# Patient Record
Sex: FEMALE | Race: WHITE | NOT HISPANIC OR LATINO | Employment: OTHER | URBAN - NONMETROPOLITAN AREA
[De-identification: names, ages, dates, MRNs, and addresses within clinical notes are randomized per-mention and may not be internally consistent; named-entity substitution may affect disease eponyms.]

---

## 2017-08-02 ENCOUNTER — TRANSFERRED RECORDS (OUTPATIENT)
Dept: HEALTH INFORMATION MANAGEMENT | Facility: OTHER | Age: 62
End: 2017-08-02

## 2017-11-17 ENCOUNTER — TRANSFERRED RECORDS (OUTPATIENT)
Dept: HEALTH INFORMATION MANAGEMENT | Facility: OTHER | Age: 62
End: 2017-11-17

## 2018-08-08 ENCOUNTER — OFFICE VISIT (OUTPATIENT)
Dept: FAMILY MEDICINE | Facility: OTHER | Age: 63
End: 2018-08-08
Attending: NURSE PRACTITIONER
Payer: COMMERCIAL

## 2018-08-08 VITALS
DIASTOLIC BLOOD PRESSURE: 88 MMHG | BODY MASS INDEX: 29.91 KG/M2 | HEIGHT: 64 IN | SYSTOLIC BLOOD PRESSURE: 150 MMHG | WEIGHT: 175.2 LBS | HEART RATE: 64 BPM | TEMPERATURE: 96.8 F

## 2018-08-08 DIAGNOSIS — M65.311 TRIGGER THUMB OF RIGHT HAND: ICD-10-CM

## 2018-08-08 DIAGNOSIS — E11.9 TYPE 2 DIABETES MELLITUS NOT AT GOAL (H): ICD-10-CM

## 2018-08-08 DIAGNOSIS — Z76.0 ENCOUNTER FOR MEDICATION REFILL: ICD-10-CM

## 2018-08-08 DIAGNOSIS — I10 HYPERTENSION, UNSPECIFIED TYPE: ICD-10-CM

## 2018-08-08 DIAGNOSIS — L71.9 ROSACEA: Primary | ICD-10-CM

## 2018-08-08 DIAGNOSIS — E11.9 DIABETES MELLITUS TYPE 2, DIET-CONTROLLED (H): ICD-10-CM

## 2018-08-08 LAB
ALBUMIN SERPL-MCNC: 4.3 G/DL (ref 3.5–5.7)
ALP SERPL-CCNC: 79 U/L (ref 34–104)
ALT SERPL W P-5'-P-CCNC: 17 U/L (ref 7–52)
ANION GAP SERPL CALCULATED.3IONS-SCNC: 7 MMOL/L (ref 3–14)
AST SERPL W P-5'-P-CCNC: 21 U/L (ref 13–39)
BILIRUB SERPL-MCNC: 1.2 MG/DL (ref 0.3–1)
BUN SERPL-MCNC: 14 MG/DL (ref 7–25)
CALCIUM SERPL-MCNC: 9.8 MG/DL (ref 8.6–10.3)
CHLORIDE SERPL-SCNC: 104 MMOL/L (ref 98–107)
CHOLEST SERPL-MCNC: 185 MG/DL
CO2 SERPL-SCNC: 28 MMOL/L (ref 21–31)
CREAT SERPL-MCNC: 0.85 MG/DL (ref 0.6–1.2)
GFR SERPL CREATININE-BSD FRML MDRD: 67 ML/MIN/1.7M2
GLUCOSE SERPL-MCNC: 167 MG/DL (ref 70–105)
HBA1C MFR BLD: 7.7 % (ref 4–6)
HDLC SERPL-MCNC: 47 MG/DL (ref 23–92)
LDLC SERPL CALC-MCNC: 116 MG/DL
NONHDLC SERPL-MCNC: 138 MG/DL
POTASSIUM SERPL-SCNC: 4.1 MMOL/L (ref 3.5–5.1)
PROT SERPL-MCNC: 8 G/DL (ref 6.4–8.9)
SODIUM SERPL-SCNC: 139 MMOL/L (ref 134–144)
TRIGL SERPL-MCNC: 109 MG/DL

## 2018-08-08 PROCEDURE — 80061 LIPID PANEL: CPT | Performed by: NURSE PRACTITIONER

## 2018-08-08 PROCEDURE — 36415 COLL VENOUS BLD VENIPUNCTURE: CPT | Performed by: NURSE PRACTITIONER

## 2018-08-08 PROCEDURE — G0463 HOSPITAL OUTPT CLINIC VISIT: HCPCS

## 2018-08-08 PROCEDURE — 83036 HEMOGLOBIN GLYCOSYLATED A1C: CPT | Performed by: NURSE PRACTITIONER

## 2018-08-08 PROCEDURE — 99203 OFFICE O/P NEW LOW 30 MIN: CPT | Performed by: NURSE PRACTITIONER

## 2018-08-08 PROCEDURE — 80053 COMPREHEN METABOLIC PANEL: CPT | Performed by: NURSE PRACTITIONER

## 2018-08-08 RX ORDER — DOXYCYCLINE 100 MG/1
100 CAPSULE ORAL
COMMUNITY
End: 2018-08-08

## 2018-08-08 RX ORDER — METOPROLOL SUCCINATE 50 MG/1
50 TABLET, EXTENDED RELEASE ORAL DAILY
Qty: 90 TABLET | Refills: 3 | Status: SHIPPED | OUTPATIENT
Start: 2018-08-08 | End: 2019-10-10

## 2018-08-08 RX ORDER — TRIAMTERENE AND HYDROCHLOROTHIAZIDE 37.5; 25 MG/1; MG/1
CAPSULE ORAL
COMMUNITY
Start: 2017-01-06 | End: 2018-08-08

## 2018-08-08 RX ORDER — METOPROLOL SUCCINATE 50 MG/1
50 TABLET, EXTENDED RELEASE ORAL
COMMUNITY
Start: 2017-02-07 | End: 2018-08-08

## 2018-08-08 RX ORDER — INSULIN PUMP SYRINGE, 3 ML
EACH MISCELLANEOUS
COMMUNITY
Start: 2017-08-04

## 2018-08-08 RX ORDER — TRIAMTERENE AND HYDROCHLOROTHIAZIDE 37.5; 25 MG/1; MG/1
1 CAPSULE ORAL DAILY
Qty: 90 CAPSULE | Refills: 3 | Status: SHIPPED | OUTPATIENT
Start: 2018-08-08 | End: 2019-08-27

## 2018-08-08 RX ORDER — BLOOD-GLUCOSE METER
KIT MISCELLANEOUS
COMMUNITY
Start: 2016-08-11 | End: 2019-10-10

## 2018-08-08 RX ORDER — DOXYCYCLINE 100 MG/1
100 CAPSULE ORAL DAILY
Qty: 90 CAPSULE | Refills: 3 | Status: SHIPPED | OUTPATIENT
Start: 2018-08-08 | End: 2020-04-16

## 2018-08-08 ASSESSMENT — ANXIETY QUESTIONNAIRES
2. NOT BEING ABLE TO STOP OR CONTROL WORRYING: NOT AT ALL
GAD7 TOTAL SCORE: 0
5. BEING SO RESTLESS THAT IT IS HARD TO SIT STILL: NOT AT ALL
6. BECOMING EASILY ANNOYED OR IRRITABLE: NOT AT ALL
7. FEELING AFRAID AS IF SOMETHING AWFUL MIGHT HAPPEN: NOT AT ALL
3. WORRYING TOO MUCH ABOUT DIFFERENT THINGS: NOT AT ALL
1. FEELING NERVOUS, ANXIOUS, OR ON EDGE: NOT AT ALL

## 2018-08-08 ASSESSMENT — PAIN SCALES - GENERAL: PAINLEVEL: MODERATE PAIN (5)

## 2018-08-08 ASSESSMENT — PATIENT HEALTH QUESTIONNAIRE - PHQ9: 5. POOR APPETITE OR OVEREATING: NOT AT ALL

## 2018-08-08 NOTE — LETTER
August 13, 2018      Margie Mccoy  [id number]  [place] [address]        Dear ,    Your lab results show normal indicators of kidney and liver function and your cholesterol and lipids are under reasonable control without medication however your A1c shows diabetes level baseline glucose.  Would I would recommend that you start Metformin extended release 500 mg tab once a day and recheck your A1c and fasting cholesterol and lipids in 3 months  Preferably in clinic so we can review labs and discuss a plan.  We will send the metformin to your pharmacy to get started please let me know--if you have any questions  I will see you in 3 months for fasting lipids, A1c follow-up labs and medication review.        Results for orders placed or performed in visit on 08/08/18   Hemoglobin A1c   Result Value Ref Range    Hemoglobin A1C 7.7 (H) 4.0 - 6.0 %   Lipid Panel   Result Value Ref Range    Cholesterol 185 <200 mg/dL    Triglycerides 109 <150 mg/dL    HDL Cholesterol 47 23 - 92 mg/dL    LDL Cholesterol Calculated 116 (H) <100 mg/dL    Non HDL Cholesterol 138 (H) <130 mg/dL   Comprehensive Metabolic Panel   Result Value Ref Range    Sodium 139 134 - 144 mmol/L    Potassium 4.1 3.5 - 5.1 mmol/L    Chloride 104 98 - 107 mmol/L    Carbon Dioxide 28 21 - 31 mmol/L    Anion Gap 7 3 - 14 mmol/L    Glucose 167 (H) 70 - 105 mg/dL    Urea Nitrogen 14 7 - 25 mg/dL    Creatinine 0.85 0.60 - 1.20 mg/dL    GFR Estimate 67 >60 mL/min/1.7m2    GFR Estimate If Black 81 >60 mL/min/1.7m2    Calcium 9.8 8.6 - 10.3 mg/dL    Bilirubin Total 1.2 (H) 0.3 - 1.0 mg/dL    Albumin 4.3 3.5 - 5.7 g/dL    Protein Total 8.0 6.4 - 8.9 g/dL    Alkaline Phosphatase 79 34 - 104 U/L    ALT 17 7 - 52 U/L    AST 21 13 - 39 U/L             If you have any questions or concerns, please call the clinic at the number listed above.       Sincerely,        PILAR Davis CNP

## 2018-08-08 NOTE — MR AVS SNAPSHOT
After Visit Summary   8/8/2018    Margie Mccoy    MRN: [id number]           Patient Information     Date Of Birth          1955        Visit Information        Provider Department      8/8/2018 8:30 AM Sravani Carrillo APRN [facility]        Today's Diagnoses     Rosacea    -  1    Hypertension, unspecified type        Diabetes mellitus type 2, diet-controlled (H)          Care Instructions      Managing Type 2 Diabetes    Type 2 diabetes is a long-term (chronic) condition. Managing your diabetes means making some changes that may be hard. Your healthcare provider, nurse, diabetes educator, and others can help you.  Managing type 2 diabetes means balancing your medicine with diet and activity. Managing your diabetes may also include checking your blood sugar. And, working with your healthcare provider to prevent complications.  Take your medicine as prescribed  You may take pills (oral medicine) or give yourself shots (usually insulin injections) for diabetes. Or you may use both. Taking your medicines or giving yourself insulin at the right times will help you control your blood sugar. Think about ways that will help you remember to take your medicines the right way every day. Ask your healthcare provider, nurse, or diabetes educator for ideas.  Although you may only take pills for your diabetes, this may change. Over time, most people with type 2 diabetes also use insulin.  Eat healthy  A healthy, well-planned diet helps control the amount of sugar in your blood. It also helps you stay at a healthy weight or lose weight, if you are overweight. Extra weight makes it harder to control diabetes.  Your healthcare provider, nurse, a dietitian, or diabetes educator will help you create a plan that works for you. You don't have to give up all the foods you like. Having meals and snacks with vegetables, fruits, lean meats, or other healthy proteins, whole grains, and low-fat or  nonfat dairy products will help control your blood sugar.  Be physically active  Being active helps lower your blood sugar. It does this by helping your body use insulin to turn food into energy. Activity also helps you manage your weight:  Ask your health care provider to work with you to create an activity program that's right for you. Your activity program is based on your age, general health, and types of activity you enjoy. You should start slowly, but aim for at least 150 minutes of exercise or activity. Don t let more than 2 days go by without being active.  Check your blood sugar  Checking your own blood sugar may be a regular part of your care. Or you may only check your blood sugar from time to time. Your healthcare provider will give you instructions about checking your blood sugar at home. Checking it tells you if your blood sugar is in your target range. Having your blood sugars within the target range means that you are managing your diabetes well.  If your blood sugar levels are too high or too low, your healthcare provider may suggest changes to your diet or activity level. He or she may also adjust your medicine.  Your healthcare provider may also tell you to check your blood sugar more often when you are sick. At certain times, for example, when you have a cold or the flu, you may need to check it more often.  Take care of yourself  When you have diabetes, you may be more likely to develop other health problems. They include foot, eye, heart, and kidney problems. By controlling your blood sugar, and taking good care of yourself, you can help prevent these problems. Your health care provider, nurse, diabetes educator, and others can assist you:    Checkups. You should have regular checkups with your healthcare provider. At those visits, you will have a physical exam that includes checking your feet. Your healthcare provider will also check your blood pressure and weight.    Other exams. You should  also have complete eye, foot, and dental exams at least once every year.    Lab tests. You will have blood and urine tests:  ? At least two times a year, your healthcare provider will check your hemoglobin A1C. This blood test shows how well you have been controlling your blood sugar over 2 to 3 months. The results help your healthcare provider manage your diabetes.    ? You will also have other lab tests. For example, to check for kidney problems and abnormal cholesterol levels.    Smoking. If you smoke, you will need to quit. Smoking increases the chance that you will develop complications from diabetes. Ask your healthcare provider about ways to quit.    Vaccines. Get a yearly flu shot. And, ask your healthcare provider about vaccines to prevent pneumonia, shingles, and hepatitis B.  Stress and depression  Most people have challenges throughout their lives. Living with diabetes, or any serious condition, can increase your stress and make you feel a lot of different emotions. In diabetes, feeling stressed or depressed can actually affect your blood sugar levels.  If you are having trouble dealing with diabetes, tell your healthcare provider. He or she can help or refer you to other healthcare providers or programs.  Support and resources  Know where you can get help. You can try the following:    Support. Ask family and friends to support your effects to take care of yourself. Or, look for a diabetes support group locally or on the internet. (Check the Connect with Others link on www.diabetes.org)    Counseling. Talk with a , psychologist, psychiatrist, or other counselor.    Information. Contact the American Diabetes Association at 740-333-5544 or www.diabetes.org  Date Last Reviewed: 6/1/2016 2000-2017 [employer]. [address] [place] [address], [place] [address]. All rights reserved. This information is not intended as a substitute for professional medical care. Always follow your  "healthcare professional's instructions.                Follow-ups after your visit        Future tests that were ordered for you today     Open Future Orders        Priority Expected Expires Ordered    Lipid Panel Routine  2019    Comprehensive Metabolic Panel Routine  2019    Hemoglobin A1c Routine  2019            Who to contact     If you have questions or need follow up information about today's clinic visit or your schedule please contact [facility] directly at [phone].  Normal or non-critical lab and imaging results will be communicated to you by [employer], letter or phone within 4 business days after the clinic has received the results. If you do not hear from us within 7 days, please contact the clinic through [employer] or phone. If you have a critical or abnormal lab result, we will notify you by phone as soon as possible.  Submit refill requests through "[employer]" or call your pharmacy and they will forward the refill request to us. Please allow 3 business days for your refill to be completed.          Additional Information About Your Visit        [employer] Information     "[employer]" lets you send messages to your doctor, view your test results, renew your prescriptions, schedule appointments and more. To sign up, go to [email or web address]" . Click on \"Log in\" on the left side of the screen, which will take you to the Welcome page. Then click on \"Sign up Now\" on the right side of the page.     You will be asked to enter the access code listed below, as well as some personal information. Please follow the directions to create your username and password.     Your access code is: [email or web address]  Expires: 2018  8:19 AM     Your access code will  in 90 days. If you need help or a new code, please call your [place] clinic or [phone].        Care EveryWhere ID     This is your Care EveryWhere ID. This could be used by other organizations to " "access your [facility] medical records  [id number]        Your Vitals Were     Pulse Temperature Height Breastfeeding? BMI (Body Mass Index)       64 96.8  F (36  C) (Tympanic) 5' 3.75\" (1.619 m) No 30.31 kg/m2        Blood Pressure from Last 3 Encounters:   08/08/18 150/88    Weight from Last 3 Encounters:   08/08/18 175 lb 3.2 oz (79.5 kg)                 Today's Medication Changes          These changes are accurate as of 8/8/18  9:13 AM.  If you have any questions, ask your nurse or doctor.               These medicines have changed or have updated prescriptions.        Dose/Directions    doxycycline 100 MG capsule   Commonly known as:  VIBRAMYCIN   This may have changed:    - when to take this  - additional instructions   Used for:  Rosacea   Changed by:  Sravani Carrillo APRN CNP        Dose:  100 mg   Take 1 capsule (100 mg) by mouth daily As needed   Quantity:  90 capsule   Refills:  3       metoprolol succinate 50 MG 24 hr tablet   Commonly known as:  TOPROL-XL   This may have changed:  when to take this   Used for:  Hypertension, unspecified type   Changed by:  Sravani Carrillo APRN CNP        Dose:  50 mg   Take 1 tablet (50 mg) by mouth daily   Quantity:  90 tablet   Refills:  3       triamterene-hydrochlorothiazide 37.5-25 MG per capsule   Commonly known as:  DYAZIDE   This may have changed:    - how much to take  - when to take this   Used for:  Hypertension, unspecified type   Changed by:  Sravani Carrillo APRN CNP        Dose:  1 capsule   Take 1 capsule by mouth daily   Quantity:  90 capsule   Refills:  3            Where to get your medicines      These medications were sent to [facility] - [place], [place] - [address] [place] [address] [address]  [address] [address], [facility] [address]     Phone:  [phone]     doxycycline 100 MG capsule    metoprolol succinate 50 MG 24 hr tablet    triamterene-hydrochlorothiazide 37.5-25 MG per capsule                Primary Care Provider Fax #    Physician No " Ref-Primary [phone]       No address on file        Equal Access to Services     PEYMAN GRECOTHIAGO : Hadii irene ku brodie Guillen, waneoda lusparkle, heidi garcia, kenya brookin hayaafior lindseyphylicia santiago laeloinafior velazco. So [facility] [phone].    ATENCIÓN: Si habla español, tiene a suarez disposición servicios gratuitos de asistencia lingüística. Llame al [phone].    We comply with applicable federal civil rights laws and [place] laws. We do not discriminate on the basis of race, color, national origin, age, disability, sex, sexual orientation, or gender identity.            Thank you!     Thank you for choosing [facility] AND [facility]  for your care. Our goal is always to provide you with excellent care. Hearing back from our patients is one way we can continue to improve our services. Please take a few minutes to complete the written survey that you may receive in the mail after your visit with us. Thank you!             Your Updated Medication List - Protect others around you: Learn how to safely use, store and throw away your medicines at www.disposemymeds.org.          This list is accurate as of 8/8/18  9:13 AM.  Always use your most recent med list.                   Brand Name Dispense Instructions for use Diagnosis    B-D ULTRA-FINE 33 LANCETS Misc      As directed once daily. DX: E11.9 Diabetes Mellitus Type 2 controlled.        doxycycline 100 MG capsule    VIBRAMYCIN    90 capsule    Take 1 capsule (100 mg) by mouth daily As needed    Rosacea       FIFTY50 GLUCOSE METER 2.0 w/Device Kit      Dispense meter, test strips, lancets covered by pt ins. E11.65 NIDDM type II, uncontrolled - Test 2 times/day. Reason: High A1C        [employer] TEST STRIPS test strip   Generic drug:  blood glucose monitoring      Dispense item covered by pt ins. E11.65 NIDDM type II, uncontrolled - Test 2 times/day. Reason: High A1C        metoprolol succinate 50 MG 24 hr tablet    TOPROL-XL    90 tablet    Take 1 tablet (50  mg) by mouth daily    Hypertension, unspecified type       triamterene-hydrochlorothiazide 37.5-25 MG per capsule    DYAZIDE    90 capsule    Take 1 capsule by mouth daily    Hypertension, unspecified type

## 2018-08-08 NOTE — NURSING NOTE
"Patient is here today to establish care and for a physical. She does not want a pap today. She also needs refills on her medications. Catherine Kathleen LPN......................8/8/2018 8:30 AM    Previous A1C unknown  at goal of <8  Urine microalbumin:creatine: unknown  Foot exam over a year  Eye exam Nov. 2017  Patient is not a current smoker  Patient is not on a daily aspirin  Blood pressure today of 150/88 is not  at the goal of <140/90 for diabetics.  Chief Complaint   Patient presents with     Establish Care     physical, doesn't want pap       Initial /88 (BP Location: Right arm, Patient Position: Sitting, Cuff Size: Adult Large)  Pulse 64  Temp 96.8  F (36  C) (Tympanic)  Ht 5' 3.75\" (1.619 m)  Wt 175 lb 3.2 oz (79.5 kg)  Breastfeeding? No  BMI 30.31 kg/m2 Estimated body mass index is 30.31 kg/(m^2) as calculated from the following:    Height as of this encounter: 5' 3.75\" (1.619 m).    Weight as of this encounter: 175 lb 3.2 oz (79.5 kg).  Medication Reconciliation: complete    Catherine Kathleen LPN      "

## 2018-08-08 NOTE — PATIENT INSTRUCTIONS
Managing Type 2 Diabetes    Type 2 diabetes is a long-term (chronic) condition. Managing your diabetes means making some changes that may be hard. Your healthcare provider, nurse, diabetes educator, and others can help you.  Managing type 2 diabetes means balancing your medicine with diet and activity. Managing your diabetes may also include checking your blood sugar. And, working with your healthcare provider to prevent complications.  Take your medicine as prescribed  You may take pills (oral medicine) or give yourself shots (usually insulin injections) for diabetes. Or you may use both. Taking your medicines or giving yourself insulin at the right times will help you control your blood sugar. Think about ways that will help you remember to take your medicines the right way every day. Ask your healthcare provider, nurse, or diabetes educator for ideas.  Although you may only take pills for your diabetes, this may change. Over time, most people with type 2 diabetes also use insulin.  Eat healthy  A healthy, well-planned diet helps control the amount of sugar in your blood. It also helps you stay at a healthy weight or lose weight, if you are overweight. Extra weight makes it harder to control diabetes.  Your healthcare provider, nurse, a dietitian, or diabetes educator will help you create a plan that works for you. You don't have to give up all the foods you like. Having meals and snacks with vegetables, fruits, lean meats, or other healthy proteins, whole grains, and low-fat or nonfat dairy products will help control your blood sugar.  Be physically active  Being active helps lower your blood sugar. It does this by helping your body use insulin to turn food into energy. Activity also helps you manage your weight:  Ask your health care provider to work with you to create an activity program that's right for you. Your activity program is based on your age, general health, and types of activity you enjoy. You  should start slowly, but aim for at least 150 minutes of exercise or activity. Don t let more than 2 days go by without being active.  Check your blood sugar  Checking your own blood sugar may be a regular part of your care. Or you may only check your blood sugar from time to time. Your healthcare provider will give you instructions about checking your blood sugar at home. Checking it tells you if your blood sugar is in your target range. Having your blood sugars within the target range means that you are managing your diabetes well.  If your blood sugar levels are too high or too low, your healthcare provider may suggest changes to your diet or activity level. He or she may also adjust your medicine.  Your healthcare provider may also tell you to check your blood sugar more often when you are sick. At certain times, for example, when you have a cold or the flu, you may need to check it more often.  Take care of yourself  When you have diabetes, you may be more likely to develop other health problems. They include foot, eye, heart, and kidney problems. By controlling your blood sugar, and taking good care of yourself, you can help prevent these problems. Your health care provider, nurse, diabetes educator, and others can assist you:    Checkups. You should have regular checkups with your healthcare provider. At those visits, you will have a physical exam that includes checking your feet. Your healthcare provider will also check your blood pressure and weight.    Other exams. You should also have complete eye, foot, and dental exams at least once every year.    Lab tests. You will have blood and urine tests:  ? At least two times a year, your healthcare provider will check your hemoglobin A1C. This blood test shows how well you have been controlling your blood sugar over 2 to 3 months. The results help your healthcare provider manage your diabetes.    ? You will also have other lab tests. For example, to check for  kidney problems and abnormal cholesterol levels.    Smoking. If you smoke, you will need to quit. Smoking increases the chance that you will develop complications from diabetes. Ask your healthcare provider about ways to quit.    Vaccines. Get a yearly flu shot. And, ask your healthcare provider about vaccines to prevent pneumonia, shingles, and hepatitis B.  Stress and depression  Most people have challenges throughout their lives. Living with diabetes, or any serious condition, can increase your stress and make you feel a lot of different emotions. In diabetes, feeling stressed or depressed can actually affect your blood sugar levels.  If you are having trouble dealing with diabetes, tell your healthcare provider. He or she can help or refer you to other healthcare providers or programs.  Support and resources  Know where you can get help. You can try the following:    Support. Ask family and friends to support your effects to take care of yourself. Or, look for a diabetes support group locally or on the internet. (Check the Connect with Others link on www.diabetes.org)    Counseling. Talk with a , psychologist, psychiatrist, or other counselor.    Information. Contact the American Diabetes Association at 140-153-5359 or www.diabetes.org  Date Last Reviewed: 6/1/2016 2000-2017 [employer]. [address] [place] [address], [place] [address]. All rights reserved. This information is not intended as a substitute for professional medical care. Always follow your healthcare professional's instructions.

## 2018-08-10 ASSESSMENT — ANXIETY QUESTIONNAIRES: GAD7 TOTAL SCORE: 0

## 2018-08-13 PROBLEM — E11.9 DIABETES MELLITUS TYPE 2, DIET-CONTROLLED (H): Status: RESOLVED | Noted: 2018-08-08 | Resolved: 2018-08-13

## 2018-08-13 PROBLEM — E11.9 TYPE 2 DIABETES MELLITUS NOT AT GOAL (H): Status: ACTIVE | Noted: 2018-08-13

## 2018-08-13 PROBLEM — E11.9 DIABETES TYPE 2, CONTROLLED (H): Status: RESOLVED | Noted: 2018-08-08 | Resolved: 2018-08-13

## 2018-08-13 PROBLEM — L71.9 ROSACEA: Status: ACTIVE | Noted: 2018-08-13

## 2018-08-13 RX ORDER — METFORMIN HCL 500 MG
500 TABLET, EXTENDED RELEASE 24 HR ORAL DAILY
Qty: 90 TABLET | Refills: 1 | Status: SHIPPED | OUTPATIENT
Start: 2018-08-13 | End: 2019-10-10 | Stop reason: DRUGHIGH

## 2018-08-13 NOTE — PROGRESS NOTES
SUBJECTIVE:   Margie Mccoy is a 63 year old female who presents to clinic today for the following health issues:    Presents for medication review, establish care, monitoring labs.  Used to receive her care per [facility]  Patient is an RN recently retired--used to work for [employer]  Transferring care to [place].  Reports has not taken medications for her type 2 diabetes, does not remember what previous A1c, does not check her glucose regularly.  Also reports chronic issues with rosacea, she has seen a dermatologist in the past and recommended doxycycline treatment--feels this has been effective and has tolerated well  She is not interested in topical treatment at this time  She reports a history of normal Paps and feels that she had one recently--she will sign release of information for previous records  Reports has been dealing with the trigger finger issue of her right thumb for months would like to see orthopedic regarding treatment--denies any injury associated        HPI      Patient Active Problem List   Diagnosis     Hypertension     [facility]     Type 2 diabetes mellitus not at goal (H)     Rosacea     History reviewed. No pertinent surgical history.    Social History   Substance Use Topics     Smoking status: Never Smoker     Smokeless tobacco: Never Used     Alcohol use Yes      Comment: 2 drinks per month      History reviewed. No pertinent family history.      Current Outpatient Prescriptions   Medication Sig Dispense Refill     B-D ULTRA-FINE 33 LANCETS MISC As directed once daily. DX: E11.9 Diabetes Mellitus Type 2 controlled.       blood glucose monitoring ([employer] TEST STRIPS) test strip Dispense item covered by pt ins. E11.65 NIDDM type II, uncontrolled - Test 2 times/day. Reason: High A1C       Blood Glucose Monitoring Suppl (FIFTY50 GLUCOSE METER 2.0) w/Device KIT Dispense meter, test strips, lancets covered by pt ins. E11.65 NIDDM type II, uncontrolled -  "Test 2 times/day. Reason: High A1C       doxycycline (VIBRAMYCIN) 100 MG capsule Take 1 capsule (100 mg) by mouth daily As needed 90 capsule 3     metFORMIN (GLUCOPHAGE-XR) 500 MG 24 hr tablet Take 1 tablet (500 mg) by mouth daily 90 tablet 1     metoprolol succinate (TOPROL-XL) 50 MG 24 hr tablet Take 1 tablet (50 mg) by mouth daily 90 tablet 3     triamterene-hydrochlorothiazide (DYAZIDE) 37.5-25 MG per capsule Take 1 capsule by mouth daily 90 capsule 3     Allergies   Allergen Reactions     Demeclocycline Nausea     Needs to take with food     Oxycodone Nausea     Tetracyclines GI Disturbance     Needs to take with food     Recent Labs   Lab Test  08/08/18   0925   A1C  7.7*   LDL  116*   HDL  47   TRIG  109   ALT  17   CR  0.85   GFRESTIMATED  67   GFRESTBLACK  81   POTASSIUM  4.1      BP Readings from Last 3 Encounters:   08/08/18 150/88    Wt Readings from Last 3 Encounters:   08/08/18 175 lb 3.2 oz (79.5 kg)                  Labs reviewed in EPIC    Review of Systems   Skin: Positive for rash.   All other systems reviewed and are negative.       OBJECTIVE:     /88 (BP Location: Right arm, Patient Position: Sitting, Cuff Size: Adult Large)  Pulse 64  Temp 96.8  F (36  C) (Tympanic)  Ht 5' 3.75\" (1.619 m)  Wt 175 lb 3.2 oz (79.5 kg)  Breastfeeding? No  BMI 30.31 kg/m2  Body mass index is 30.31 kg/(m^2).  Physical Exam   Constitutional: She is oriented to person, place, and time. She appears well-developed and well-nourished.   Cardiovascular: Normal rate.    Pulmonary/Chest: Effort normal.   Musculoskeletal: Normal range of motion.   Neurological: She is alert and oriented to person, place, and time.   Skin: Skin is warm and dry. Rash noted. There is erythema.   Macular erythemic rash patches forehead, bilateral malar cheeks, chin with telangiectasias   Psychiatric: She has a normal mood and affect. Her behavior is normal. Judgment and thought content normal.   Nursing note and vitals " reviewed.      Results for orders placed or performed in visit on 08/08/18   Hemoglobin A1c   Result Value Ref Range    Hemoglobin A1C 7.7 (H) 4.0 - 6.0 %   Lipid Panel   Result Value Ref Range    Cholesterol 185 <200 mg/dL    Triglycerides 109 <150 mg/dL    HDL Cholesterol 47 23 - 92 mg/dL    LDL Cholesterol Calculated 116 (H) <100 mg/dL    Non HDL Cholesterol 138 (H) <130 mg/dL   Comprehensive Metabolic Panel   Result Value Ref Range    Sodium 139 134 - 144 mmol/L    Potassium 4.1 3.5 - 5.1 mmol/L    Chloride 104 98 - 107 mmol/L    Carbon Dioxide 28 21 - 31 mmol/L    Anion Gap 7 3 - 14 mmol/L    Glucose 167 (H) 70 - 105 mg/dL    Urea Nitrogen 14 7 - 25 mg/dL    Creatinine 0.85 0.60 - 1.20 mg/dL    GFR Estimate 67 >60 mL/min/1.7m2    GFR Estimate If Black 81 >60 mL/min/1.7m2    Calcium 9.8 8.6 - 10.3 mg/dL    Bilirubin Total 1.2 (H) 0.3 - 1.0 mg/dL    Albumin 4.3 3.5 - 5.7 g/dL    Protein Total 8.0 6.4 - 8.9 g/dL    Alkaline Phosphatase 79 34 - 104 U/L    ALT 17 7 - 52 U/L    AST 21 13 - 39 U/L         ASSESSMENT/PLAN:     Medications reviewed and refilled as requested    Letter sent regarding A1c--- recommend starting metformin 500 mg extended release daily with follow-up A1c and fasting lipids in 3 months during clinic to discuss treatment plan      We will send referral to orthopedics for evaluation and treatment recommendations for trigger finger as requested      1. Rosacea    Discussed other topical options for managing rosacea along with avoiding triggers--- would like to continue current treatment plan from dermatologist  Does not appear to be well controlled    - doxycycline (VIBRAMYCIN) 100 MG capsule; Take 1 capsule (100 mg) by mouth daily As needed  Dispense: 90 capsule; Refill: 3    2. Hypertension, unspecified type    - metoprolol succinate (TOPROL-XL) 50 MG 24 hr tablet; Take 1 tablet (50 mg) by mouth daily  Dispense: 90 tablet; Refill: 3  - triamterene-hydrochlorothiazide (DYAZIDE) 37.5-25 MG per  capsule; Take 1 capsule by mouth daily  Dispense: 90 capsule; Refill: 3  - Hemoglobin A1c; Future  - Lipid Panel; Future  - Comprehensive Metabolic Panel; Future  - Hemoglobin A1c  - Lipid Panel  - Comprehensive Metabolic Panel    3. Diabetes mellitus type 2, diet-controlled (H)    - Hemoglobin A1c; Future  - Comprehensive Metabolic Panel; Future  - Hemoglobin A1c  - Comprehensive Metabolic Panel    4. Encounter for medication refill      5. Trigger thumb of right hand    - ORTHOPEDICS ADULT REFERRAL    6. Type 2 diabetes mellitus not at goal (H)    - metFORMIN (GLUCOPHAGE-XR) 500 MG 24 hr tablet; Take 1 tablet (500 mg) by mouth daily  Dispense: 90 tablet; Refill: 1      PILAR Davis [facility]

## 2018-08-14 ENCOUNTER — HEALTH MAINTENANCE LETTER (OUTPATIENT)
Age: 63
End: 2018-08-14

## 2018-08-16 DIAGNOSIS — M65.311 TRIGGER FINGER OF RIGHT THUMB: Primary | ICD-10-CM

## 2018-08-17 ENCOUNTER — OFFICE VISIT (OUTPATIENT)
Dept: ORTHOPEDICS | Facility: OTHER | Age: 63
End: 2018-08-17
Attending: NURSE PRACTITIONER
Payer: COMMERCIAL

## 2018-08-17 ENCOUNTER — HOSPITAL ENCOUNTER (OUTPATIENT)
Dept: GENERAL RADIOLOGY | Facility: OTHER | Age: 63
Discharge: HOME | End: 2018-08-17
Attending: SPECIALIST | Admitting: SPECIALIST
Payer: COMMERCIAL

## 2018-08-17 VITALS
HEIGHT: 64 IN | BODY MASS INDEX: 29.02 KG/M2 | WEIGHT: 170 LBS | HEART RATE: 48 BPM | DIASTOLIC BLOOD PRESSURE: 80 MMHG | SYSTOLIC BLOOD PRESSURE: 120 MMHG

## 2018-08-17 DIAGNOSIS — M65.311 TRIGGER FINGER OF RIGHT THUMB: ICD-10-CM

## 2018-08-17 DIAGNOSIS — Z00.00 ROUTINE GENERAL MEDICAL EXAMINATION AT A HEALTH CARE FACILITY: Primary | ICD-10-CM

## 2018-08-17 PROCEDURE — G0463 HOSPITAL OUTPT CLINIC VISIT: HCPCS

## 2018-08-17 PROCEDURE — 73140 X-RAY EXAM OF FINGER(S): CPT | Mod: RT

## 2018-08-17 NOTE — NURSING NOTE
Consult for right trigger finger. Patient here seeing Dr. Newton from [facility], .  Camille Shen LPN 8/17/2018 11:18 AM

## 2018-08-17 NOTE — MR AVS SNAPSHOT
After Visit Summary   8/17/2018    Margie Mccoy    MRN: [id number]           Patient Information     Date Of Birth          1955        Visit Information        Provider Department      8/17/2018 11:00 AM Wagner Newton MD [facility]        Today's Diagnoses     Routine general medical examination at a health care facility    -  1       Follow-ups after your visit        Your next 10 appointments already scheduled     Oct 23, 2018  9:15 AM CDT   Office Visit with PILAR Davis CNP   [facility] ([facility])    [address]  [place] [id number] [phone]           Bring a current list of meds and any records pertaining to this visit. For Physicals, please bring immunization records and any forms needing to be filled out. Please arrive 10 minutes early to complete paperwork.              Who to contact     If you have questions or need follow up information about today's clinic visit or your schedule please contact [facility] directly at [phone].  Normal or non-critical lab and imaging results will be communicated to you by proVITALhart, letter or phone within 4 business days after the clinic has received the results. If you do not hear from us within 7 days, please contact the clinic through ePod Sol[employer] or phone. If you have a critical or abnormal lab result, we will notify you by phone as soon as possible.  Submit refill requests through [employer] or call your pharmacy and they will forward the refill request to us. Please allow 3 business days for your refill to be completed.          Additional Information About Your Visit        MyChart Information     [employer] gives you secure access to your electronic health record. If you see a primary care provider, you can also send messages to your care team and make appointments. If you have questions, please call your primary care clinic.  If  "you do not have a primary care provider, please call [phone] and they will assist you.        Care EveryWhere ID     This is your Care EveryWhere ID. This could be used by other organizations to access your [place] medical records  [id number]        Your Vitals Were     Pulse Height Breastfeeding? BMI (Body Mass Index)          48 1.626 m (5' 4\") No 29.18 kg/m2         Blood Pressure from Last 3 Encounters:   08/17/18 120/80   08/08/18 150/88    Weight from Last 3 Encounters:   08/17/18 77.1 kg (170 lb)   08/08/18 79.5 kg (175 lb 3.2 oz)              Today, you had the following     No orders found for display       Primary Care Provider Fax #    Physician No Ref-Primary [phone]       No address on file        Equal Access to Services     [facility] : Suma Guillen, walatisha ayala, heidi kaalmataqueria garcia, kenya cloud . So [facility] [phone].    ATENCIÓN: Si habla español, tiene a suarez disposición servicios gratuitos de asistencia lingüística. [place] al [phone].    We comply with applicable federal civil rights laws and [place] laws. We do not discriminate on the basis of race, color, national origin, age, disability, sex, sexual orientation, or gender identity.            Thank you!     Thank you for choosing [facility] AND [facility]  for your care. Our goal is always to provide you with excellent care. Hearing back from our patients is one way we can continue to improve our services. Please take a few minutes to complete the written survey that you may receive in the mail after your visit with us. Thank you!             Your Updated Medication List - Protect others around you: Learn how to safely use, store and throw away your medicines at www.disposemymeds.org.          This list is accurate as of 8/17/18 11:59 PM.  Always use your most recent med list.                   Brand Name Dispense Instructions for use Diagnosis    B-D ULTRA-FINE " 33 LANCETS [facility]      As directed once daily. DX: E11.9 Diabetes Mellitus Type 2 controlled.        doxycycline 100 MG capsule    VIBRAMYCIN    90 capsule    Take 1 capsule (100 mg) by mouth daily As needed    Rosacea       FIFTY50 GLUCOSE METER 2.0 w/Device Kit      Dispense meter, test strips, lancets covered by pt ins. E11.65 NIDDM type II, uncontrolled - Test 2 times/day. Reason: High A1C        [employer] TEST STRIPS test strip   Generic drug:  blood glucose monitoring      Dispense item covered by pt ins. E11.65 NIDDM type II, uncontrolled - Test 2 times/day. Reason: High A1C        metFORMIN 500 MG 24 hr tablet    GLUCOPHAGE-XR    90 tablet    Take 1 tablet (500 mg) by mouth daily    Type 2 diabetes mellitus not at goal (H)       metoprolol succinate 50 MG 24 hr tablet    TOPROL-XL    90 tablet    Take 1 tablet (50 mg) by mouth daily    Hypertension, unspecified type       triamterene-hydrochlorothiazide 37.5-25 MG per capsule    DYAZIDE    90 capsule    Take 1 capsule by mouth daily    Hypertension, unspecified type

## 2018-08-17 NOTE — NURSING NOTE
Consult right trigger thumb. Patient is seeing Dr. Wagner Newton MD from [facility] today, .

## 2018-09-12 ENCOUNTER — OFFICE VISIT (OUTPATIENT)
Dept: FAMILY MEDICINE | Facility: OTHER | Age: 63
End: 2018-09-12
Attending: NURSE PRACTITIONER
Payer: COMMERCIAL

## 2018-09-12 VITALS
SYSTOLIC BLOOD PRESSURE: 122 MMHG | DIASTOLIC BLOOD PRESSURE: 80 MMHG | OXYGEN SATURATION: 98 % | WEIGHT: 175.4 LBS | TEMPERATURE: 97 F | HEART RATE: 54 BPM | BODY MASS INDEX: 30.11 KG/M2

## 2018-09-12 DIAGNOSIS — Z01.818 PREOPERATIVE EXAMINATION: Primary | ICD-10-CM

## 2018-09-12 DIAGNOSIS — M65.30 TRIGGER FINGER, ACQUIRED: ICD-10-CM

## 2018-09-12 DIAGNOSIS — I10 HYPERTENSION, UNSPECIFIED TYPE: ICD-10-CM

## 2018-09-12 DIAGNOSIS — R73.03 PREDIABETES: ICD-10-CM

## 2018-09-12 LAB
BASOPHILS # BLD AUTO: 0.1 10E9/L (ref 0–0.2)
BASOPHILS NFR BLD AUTO: 1.4 %
DIFFERENTIAL METHOD BLD: ABNORMAL
EOSINOPHIL # BLD AUTO: 0.2 10E9/L (ref 0–0.7)
EOSINOPHIL NFR BLD AUTO: 4.5 %
ERYTHROCYTE [DISTWIDTH] IN BLOOD BY AUTOMATED COUNT: 12.5 % (ref 10–15)
HCT VFR BLD AUTO: 47.4 % (ref 35–47)
HGB BLD-MCNC: 16.4 G/DL (ref 11.7–15.7)
IMM GRANULOCYTES # BLD: 0 10E9/L (ref 0–0.4)
IMM GRANULOCYTES NFR BLD: 0.2 %
LYMPHOCYTES # BLD AUTO: 2.2 10E9/L (ref 0.8–5.3)
LYMPHOCYTES NFR BLD AUTO: 43.1 %
MCH RBC QN AUTO: 30 PG (ref 26.5–33)
MCHC RBC AUTO-ENTMCNC: 34.6 G/DL (ref 31.5–36.5)
MCV RBC AUTO: 87 FL (ref 78–100)
MONOCYTES # BLD AUTO: 0.8 10E9/L (ref 0–1.3)
MONOCYTES NFR BLD AUTO: 15.2 %
NEUTROPHILS # BLD AUTO: 1.8 10E9/L (ref 1.6–8.3)
NEUTROPHILS NFR BLD AUTO: 35.6 %
PLATELET # BLD AUTO: 202 10E9/L (ref 150–450)
RBC # BLD AUTO: 5.47 10E12/L (ref 3.8–5.2)
WBC # BLD AUTO: 5.1 10E9/L (ref 4–11)

## 2018-09-12 PROCEDURE — 85025 COMPLETE CBC W/AUTO DIFF WBC: CPT | Performed by: NURSE PRACTITIONER

## 2018-09-12 PROCEDURE — G0463 HOSPITAL OUTPT CLINIC VISIT: HCPCS

## 2018-09-12 PROCEDURE — 99214 OFFICE O/P EST MOD 30 MIN: CPT | Mod: 25 | Performed by: NURSE PRACTITIONER

## 2018-09-12 PROCEDURE — G0463 HOSPITAL OUTPT CLINIC VISIT: HCPCS | Mod: 25

## 2018-09-12 PROCEDURE — 93010 ELECTROCARDIOGRAM REPORT: CPT | Performed by: INTERNAL MEDICINE

## 2018-09-12 PROCEDURE — 93005 ELECTROCARDIOGRAM TRACING: CPT | Performed by: NURSE PRACTITIONER

## 2018-09-12 PROCEDURE — 36415 COLL VENOUS BLD VENIPUNCTURE: CPT | Performed by: NURSE PRACTITIONER

## 2018-09-12 ASSESSMENT — PAIN SCALES - GENERAL: PAINLEVEL: NO PAIN (0)

## 2018-09-12 NOTE — PROGRESS NOTES
----------------- PREOPERATIVE EXAM ------------------  9/12/2018    SUBJECTIVE:  Margie Mccoy is a 63 year old female here for preoperative optimization.    I was asked to see Margie Mccoy by Dr. Newton for preoperative optimization     Date of Surgery: September 21, 2018  Type of Surgery: Trigger finger release  Surgeon: Dr Newton  Hospital:  [facility]    HPI: Reports overall health is been good, denies any cough fever congestion.  No tobacco history.  No cardiac or respiratory condition history  Blood pressure is been normotensive on current medications.  Has not started Metformin yet for pre-diabetes.  Will plan to start after procedure.  Able to tolerate > 4 METS        Fever/Chills or other infectious symptoms in past month:  None  >10lb weight loss in past two months:  None    Patient Active Problem List    Diagnosis Date Noted     Type 2 diabetes mellitus not at goal (H) 08/13/2018     Priority: Medium     Rosacea 08/13/2018     Priority: Medium     Hypertension 08/08/2018     Priority: Medium     Health Care Home 01/11/2017     Priority: Medium     Last Assessment & Plan:   Formatting of this note may be different from the original.  If you have any questions or concerns please contact:  Your Care Team at [facility] :[phone]  Primary Care Provider: Yanira Celis MD  RN Coordinator: RACHEL House  CareLine at [phone] or [phone] after hours and on weekends.     Care Coordination  (Community Resources, Specialists, Home Health Agency, Caregiver, Supply Vendor, )     Contact Name PAUL Obtained? Phone Number Role in Care Comments    Lacy RAMOS                Action Plan    Margie's Chosen Goal:  Diabetic control    Detailed Action Steps to Achieve Goal & Who Will Complete:  Pt will see a dietician, and have diabetic education. The pt will establish trusting relationship with RN.    Confidence Level:  On a scale of 1-10, Margie's confidence level to achieve  this goal is 7    Date Goal Achieved     Additional Patient Instructions    1. Pt will start metformin 500 mg BID  2. Pt will see dietician  3. Pt will see diabetic nurse educator  4. Pt will call nurse care coordinator with questions related to DM    Follow-Up    Lacy will follow-up with Margie by phone on 1/11/17.         Past Medical History:   Diagnosis Date     Rosacea 8/13/2018       Past Surgical History:   Procedure Laterality Date     COLONOSCOPY  2007    neg     FUSION CERVICAL ANTERIOR TWO LEVELS  2014    [facility]--Dr Gray     TUBAL LIGATION      22 years old       History reviewed. No pertinent family history.    Social History   Substance Use Topics     Smoking status: Never Smoker     Smokeless tobacco: Never Used     Alcohol use Yes      Comment: 2 drinks per month        Current Outpatient Prescriptions   Medication Sig Dispense Refill     B-D ULTRA-FINE 33 LANCETS MISC As directed once daily. DX: E11.9 Diabetes Mellitus Type 2 controlled.       blood glucose monitoring ([employer] TEST STRIPS) test strip Dispense item covered by pt ins. E11.65 NIDDM type II, uncontrolled - Test 2 times/day. Reason: High A1C       Blood Glucose Monitoring Suppl (FIFTY50 GLUCOSE METER 2.0) w/Device KIT Dispense meter, test strips, lancets covered by pt ins. E11.65 NIDDM type II, uncontrolled - Test 2 times/day. Reason: High A1C       doxycycline (VIBRAMYCIN) 100 MG capsule Take 1 capsule (100 mg) by mouth daily As needed 90 capsule 3     metFORMIN (GLUCOPHAGE-XR) 500 MG 24 hr tablet Take 1 tablet (500 mg) by mouth daily 90 tablet 1     metoprolol succinate (TOPROL-XL) 50 MG 24 hr tablet Take 1 tablet (50 mg) by mouth daily 90 tablet 3     triamterene-hydrochlorothiazide (DYAZIDE) 37.5-25 MG per capsule Take 1 capsule by mouth daily 90 capsule 3       Allergies:  Allergies   Allergen Reactions     Demeclocycline Nausea     Needs to take with food     Hibiclens      dermatitis     Oxycodone Nausea     Tetracyclines GI  Disturbance     Needs to take with food       ROS:    Surgical:  patient denies previous complications from prior surgeries including but not limited to prolonged bleeding, anesthesia complications, dysrhythmias, surgical wound infections, or prolonged hospital stay.    Denies family hx of bleeding tendencies, anesthesia complications, or other problems with surgery.     -------------------------------------------------------------    PHYSICAL EXAM:  /80 (BP Location: Right arm, Patient Position: Sitting, Cuff Size: Adult Regular)  Pulse 54  Temp 97  F (36.1  C) (Tympanic)  Wt 175 lb 6.4 oz (79.6 kg)  SpO2 98%  BMI 30.11 kg/m2    EXAM:  General Appearance: Pleasant, alert, appropriate appearance for age. No acute distress  Head Exam: Normal. Normocephalic, atraumatic.  Eyes: PERRL, EOMI  Ears: Normal TM's bilaterally. Normal auditory canals and external ears.   OroPharynx: Normal buccal mucosa. Normal pharynx.  Neck: Supple, no masses or nodes, no lymphadenopathy.  No thyromegaly.  Lungs: Normal chest wall and respirations. Clear to auscultation, no wheezes or crackles.  Cardiovascular: Regular rate and rhythm. S1, S2, no murmurs.  Gastrointestinal: Soft, nontender, no abnormal masses or organomegaly. BS normal   Musculoskeletal: No edema.  Skin: no concerning or new rashes.  Neurologic Exam: CN 2-12 grossly intact.  Normal gait.    Psychiatric Exam: Alert and oriented, appropriate affect.      EKG:  NSR see internal medicine final interpretation in epic  ---------------------------------------------------------------  LABS  Results for orders placed or performed during the hospital encounter of 08/17/18   XR Finger Right G/E 2 Views    Narrative    PROCEDURE: XR FINGER RT G/E 2 VW 8/17/2018 11:17 AM    HISTORY: ; Trigger finger of right thumb    COMPARISONS: None.    TECHNIQUE: 3 views.    FINDINGS: No acute fracture or dislocation is seen. There is no focal  bone lesion.    There is mild degenerative  change in the first carpal metacarpal  joint.         Impression    IMPRESSION: No acute abnormality.    RENUKA CASTRO MD       ASSESSEMENT AND PLAN:    Optimized and ready for schedule procedure    Has stopped aspirin and NSAIDs  Directed to call outpatient surgery center for specific instructions prior to procedure    (Z01.818) Preoperative examination  (primary encounter diagnosis)    Plan: CBC and Differential            (M65.30) Trigger finger, acquired    Plan: CBC and Differential            (I10) Hypertension, unspecified type    Metabolic syndrome--A1c improving will start metformin extended release due for A1c November 2018    PRE OP RECOMMENDATIONS:  Patient is on chronic pain medications none  Patient is on antiplatlet/anticoagulation medication none  Other medications that need adjustment perioperatively none    Other:  Patient was advised to call our office and the surgical services with any change in condition or new symptoms if they were to develop between today and their surgical date.  Especially any cardiopulmonary symptoms or symptoms concerning for an infection.

## 2018-09-12 NOTE — MR AVS SNAPSHOT
After Visit Summary   9/12/2018    Margie Mccoy    MRN: [id number]           Patient Information     Date Of Birth          1955        Visit Information        Provider Department      9/12/2018 9:15 AM Sravani Carrillo APRN CNP [facility]        Today's Diagnoses     Preoperative examination    -  1    Trigger finger, acquired        Hypertension, unspecified type          Care Instructions    You will get a call from surgery for specific instructions before              Follow-ups after your visit        Follow-up notes from your care team     Return for as directed by surgeon.      Your next 10 appointments already scheduled     Sep 21, 2018   Procedure with Wagner Newton MD   [facility]    [address]  [place] [address] [phone]            Oct 23, 2018  9:15 AM CDT   Office Visit with PILAR Davis CNP   [facility] ([facility]    [address]  [place] [address] [phone]           Bring a current list of meds and any records pertaining to this visit. For Physicals, please bring immunization records and any forms needing to be filled out. Please arrive 10 minutes early to complete paperwork.              Future tests that were ordered for you today     Open Future Orders        Priority Expected Expires Ordered    CBC and Differential Routine  9/12/2019 9/12/2018            Who to contact     If you have questions or need follow up information about today's clinic visit or your schedule please contact [facility] directly at [phone].  Normal or non-critical lab and imaging results will be communicated to you by MyChart, letter or phone within 4 business days after the clinic has received the results. If you do not hear from us within 7 days, please contact the clinic through MyChart or phone.  If you have a critical or abnormal lab result, we will notify you by phone as soon as possible.  Submit refill requests through [employer] or call your pharmacy and they will forward the refill request to us. Please allow 3 business days for your refill to be completed.          Additional Information About Your Visit        "[employer]hart Information     [employer] gives you secure access to your electronic health record. If you see a primary care provider, you can also send messages to your care team and make appointments. If you have questions, please call your primary care clinic.  If you do not have a primary care provider, please call [phone] and they will assist you.        Care EveryWhere ID     This is your Care EveryWhere ID. This could be used by other organizations to access your [facility] medical records  [id number]        Your Vitals Were     Pulse Temperature Pulse Oximetry BMI (Body Mass Index)          54 97  F (36.1  C) (Tympanic) 98% 30.11 kg/m2         Blood Pressure from Last 3 Encounters:   09/12/18 122/80   08/17/18 120/80   08/08/18 150/88    Weight from Last 3 Encounters:   09/12/18 175 lb 6.4 oz (79.6 kg)   08/17/18 170 lb (77.1 kg)   08/08/18 175 lb 3.2 oz (79.5 kg)               Primary Care Provider Fax #    Physician No Ref-Primary [phone]       No address on file        Equal Access to Services     PEYMAN ALBERTO : Hadii irene ku hadasho Sostevensonali, waaxda luqadaha, qaybta kaalmada adezane, kenya cloud . So [facility] [phone].    ATENCIÓN: Si habla español, tiene a suarez disposición servicios gratuitos de asistencia lingüística. Llame al [phone].    We comply with applicable federal civil rights laws and [place] laws. We do not discriminate on the basis of race, color, national origin, age, disability, sex, sexual orientation, or gender identity.            Thank you!     Thank you for choosing [facility]  for your care. Our goal is  always to provide you with excellent care. Hearing back from our patients is one way we can continue to improve our services. Please take a few minutes to complete the written survey that you may receive in the mail after your visit with us. Thank you!             Your Updated Medication List - Protect others around you: Learn how to safely use, store and throw away your medicines at www.disposemymeds.org.          This list is accurate as of 9/12/18  9:58 AM.  Always use your most recent med list.                   Brand Name Dispense Instructions for use Diagnosis    B-D ULTRA-FINE 33 LANCETS Misc      As directed once daily. DX: E11.9 Diabetes Mellitus Type 2 controlled.        doxycycline 100 MG capsule    VIBRAMYCIN    90 capsule    Take 1 capsule (100 mg) by mouth daily As needed    Rosacea       FIFTY50 GLUCOSE METER 2.0 w/Device Kit      Dispense meter, test strips, lancets covered by pt ins. E11.65 NIDDM type II, uncontrolled - Test 2 times/day. Reason: High A1C        [employer] TEST STRIPS test strip   Generic drug:  blood glucose monitoring      Dispense item covered by pt ins. E11.65 NIDDM type II, uncontrolled - Test 2 times/day. Reason: High A1C        metFORMIN 500 MG 24 hr tablet    GLUCOPHAGE-XR    90 tablet    Take 1 tablet (500 mg) by mouth daily    Type 2 diabetes mellitus not at goal (H)       metoprolol succinate 50 MG 24 hr tablet    TOPROL-XL    90 tablet    Take 1 tablet (50 mg) by mouth daily    Hypertension, unspecified type       triamterene-hydrochlorothiazide 37.5-25 MG per capsule    DYAZIDE    90 capsule    Take 1 capsule by mouth daily    Hypertension, unspecified type

## 2018-09-12 NOTE — LETTER
September 12, 2018      Margie Mccoy  [id number]  [facility] [address]        Dear ,    Please see enclosed lab results.    Results for orders placed or performed in visit on 09/12/18   CBC and Differential   Result Value Ref Range    WBC 5.1 4.0 - 11.0 10e9/L    RBC Count 5.47 (H) 3.8 - 5.2 10e12/L    Hemoglobin 16.4 (H) 11.7 - 15.7 g/dL    Hematocrit 47.4 (H) 35.0 - 47.0 %    MCV 87 78 - 100 fl    MCH 30.0 26.5 - 33.0 pg    MCHC 34.6 31.5 - 36.5 g/dL    RDW 12.5 10.0 - 15.0 %    Platelet Count 202 150 - 450 10e9/L    Diff Method Automated Method     % Neutrophils 35.6 %    % Lymphocytes 43.1 %    % Monocytes 15.2 %    % Eosinophils 4.5 %    % Basophils 1.4 %    % Immature Granulocytes 0.2 %    Absolute Neutrophil 1.8 1.6 - 8.3 10e9/L    Absolute Lymphocytes 2.2 0.8 - 5.3 10e9/L    Absolute Monocytes 0.8 0.0 - 1.3 10e9/L    Absolute Eosinophils 0.2 0.0 - 0.7 10e9/L    Absolute Basophils 0.1 0.0 - 0.2 10e9/L    Abs Immature Granulocytes 0.0 0 - 0.4 10e9/L             If you have any questions or concerns, please call the clinic at the number listed above.       Sincerely,        PILAR Davis CNP

## 2018-09-12 NOTE — NURSING NOTE
"Patient presents in the clinic for a pre-op exam.    Date of Surgery: 9/21/18   Type of Surgery: Right thumb trigger release   Surgeon:    [facility]      Fever/Chills or other infectious symptoms in past month: no  >10lb weight loss in past two months: no  O2 SAT: 98%    Health Care Directive/Code status:  Not on file  Hx of blood transfusions:   no  Td up to date:  Patient states she had vaccine 4 years ago  History of VRE/MRSA:  no     Preoperative Evaluation: Obstructive Sleep Apnea screening    S: Snore -  Do you snore loudly? (louder than talking or loud enough to be heard through closed doors)no  T: Tired - Do you often feel tired, fatigued, or sleepy during the daytime?no  O: Observed - Has anyone ever observed you stop breathing during your sleep?no  P: Pressure - Do you have or are you being treated for high blood pressure?yes  B: BMI - BMI greater than 35kg/m2?no  A: Age - Age over 50 years old?yes  N: Neck - Neck circumference greater than 40 cm?no  G: Gender - Gender: Male?no    Total number of \"YES\" responses:  2    Scoring: Low risk of LANG 0-2  At Risk of LANG: >3 High Risk of LANG: 5-8    Razia Pruett 9/12/2018 9:17 AM      Previous A1C is at goal of <8  Lab Results   Component Value Date    A1C 7.7 08/08/2018     Urine microalbumin:creatine: unknown   Foot exam 2017  Eye exam 11/2017    Tobacco User no  Patient is not on a daily aspirin  Patient is not on a Statin.  Blood pressure today of:     BP Readings from Last 1 Encounters:   08/17/18 120/80      is at the goal of <139/89 for diabetics.    Razia Pruett LPN on 9/12/2018 at 9:22 AM    Chief Complaint   Patient presents with     Pre-Op Exam       Initial /80 (BP Location: Right arm, Patient Position: Sitting, Cuff Size: Adult Regular)  Pulse 54  Temp 97  F (36.1  C) (Tympanic)  Wt 175 lb 6.4 oz (79.6 kg)  SpO2 98%  BMI 30.11 kg/m2 Estimated body mass index is 30.11 kg/(m^2) as calculated from the following:   " " Height as of 8/17/18: 5' 4\" (1.626 m).    Weight as of this encounter: 175 lb 6.4 oz (79.6 kg).  Medication Reconciliation: complete    Razia Pruett LPN    "

## 2018-09-20 ENCOUNTER — ANESTHESIA EVENT (OUTPATIENT)
Dept: SURGERY | Facility: OTHER | Age: 63
End: 2018-09-20
Payer: COMMERCIAL

## 2018-09-20 RX ORDER — FENTANYL CITRATE 50 UG/ML
25-50 INJECTION, SOLUTION INTRAMUSCULAR; INTRAVENOUS
Status: CANCELLED | OUTPATIENT
Start: 2018-09-20

## 2018-09-20 RX ORDER — ONDANSETRON 4 MG/1
4 TABLET, ORALLY DISINTEGRATING ORAL EVERY 30 MIN PRN
Status: CANCELLED | OUTPATIENT
Start: 2018-09-20

## 2018-09-20 RX ORDER — SODIUM CHLORIDE, SODIUM LACTATE, POTASSIUM CHLORIDE, CALCIUM CHLORIDE 600; 310; 30; 20 MG/100ML; MG/100ML; MG/100ML; MG/100ML
INJECTION, SOLUTION INTRAVENOUS CONTINUOUS
Status: CANCELLED | OUTPATIENT
Start: 2018-09-20

## 2018-09-20 RX ORDER — NALOXONE HYDROCHLORIDE 0.4 MG/ML
.1-.4 INJECTION, SOLUTION INTRAMUSCULAR; INTRAVENOUS; SUBCUTANEOUS
Status: CANCELLED | OUTPATIENT
Start: 2018-09-20 | End: 2018-09-21

## 2018-09-20 RX ORDER — ONDANSETRON 2 MG/ML
4 INJECTION INTRAMUSCULAR; INTRAVENOUS EVERY 30 MIN PRN
Status: CANCELLED | OUTPATIENT
Start: 2018-09-20

## 2018-09-20 RX ORDER — MEPERIDINE HYDROCHLORIDE 50 MG/ML
12.5 INJECTION INTRAMUSCULAR; INTRAVENOUS; SUBCUTANEOUS
Status: CANCELLED | OUTPATIENT
Start: 2018-09-20

## 2018-09-21 ENCOUNTER — SURGERY (OUTPATIENT)
Age: 63
End: 2018-09-21

## 2018-09-21 ENCOUNTER — ANESTHESIA (OUTPATIENT)
Dept: SURGERY | Facility: OTHER | Age: 63
End: 2018-09-21
Payer: COMMERCIAL

## 2018-09-21 ENCOUNTER — HOSPITAL ENCOUNTER (OUTPATIENT)
Facility: OTHER | Age: 63
Discharge: HOME | End: 2018-09-21
Attending: FAMILY MEDICINE | Admitting: FAMILY MEDICINE
Payer: COMMERCIAL

## 2018-09-21 VITALS
TEMPERATURE: 97.4 F | BODY MASS INDEX: 30.04 KG/M2 | HEART RATE: 61 BPM | WEIGHT: 175 LBS | RESPIRATION RATE: 18 BRPM | OXYGEN SATURATION: 97 % | DIASTOLIC BLOOD PRESSURE: 68 MMHG | SYSTOLIC BLOOD PRESSURE: 134 MMHG

## 2018-09-21 DIAGNOSIS — M65.311 TRIGGER THUMB OF RIGHT HAND: Primary | ICD-10-CM

## 2018-09-21 PROCEDURE — 25000128 H RX IP 250 OP 636: Performed by: NURSE ANESTHETIST, CERTIFIED REGISTERED

## 2018-09-21 PROCEDURE — 37000009 ZZH ANESTHESIA TECHNICAL FEE, EACH ADDTL 15 MIN: Performed by: SPECIALIST

## 2018-09-21 PROCEDURE — 36000052 ZZH SURGERY LEVEL 2 EA 15 ADDTL MIN: Performed by: SPECIALIST

## 2018-09-21 PROCEDURE — 26055 INCISE FINGER TENDON SHEATH: CPT | Performed by: NURSE ANESTHETIST, CERTIFIED REGISTERED

## 2018-09-21 PROCEDURE — 37000008 ZZH ANESTHESIA TECHNICAL FEE, 1ST 30 MIN: Performed by: SPECIALIST

## 2018-09-21 PROCEDURE — 25000125 ZZHC RX 250: Performed by: NURSE ANESTHETIST, CERTIFIED REGISTERED

## 2018-09-21 PROCEDURE — 27210794 ZZH OR GENERAL SUPPLY STERILE: Performed by: SPECIALIST

## 2018-09-21 PROCEDURE — 40000306 ZZH STATISTIC PRE PROC ASSESS II: Performed by: SPECIALIST

## 2018-09-21 PROCEDURE — 71000027 ZZH RECOVERY PHASE 2 EACH 15 MINS: Performed by: SPECIALIST

## 2018-09-21 PROCEDURE — 25000125 ZZHC RX 250: Performed by: SPECIALIST

## 2018-09-21 PROCEDURE — 36000050 ZZH SURGERY LEVEL 2 1ST 30 MIN: Performed by: SPECIALIST

## 2018-09-21 RX ORDER — NALOXONE HYDROCHLORIDE 0.4 MG/ML
.1-.4 INJECTION, SOLUTION INTRAMUSCULAR; INTRAVENOUS; SUBCUTANEOUS
Status: DISCONTINUED | OUTPATIENT
Start: 2018-09-21 | End: 2018-09-21 | Stop reason: HOSPADM

## 2018-09-21 RX ORDER — LIDOCAINE 40 MG/G
CREAM TOPICAL
Status: DISCONTINUED | OUTPATIENT
Start: 2018-09-21 | End: 2018-09-21 | Stop reason: HOSPADM

## 2018-09-21 RX ORDER — PROPOFOL 10 MG/ML
INJECTION, EMULSION INTRAVENOUS PRN
Status: DISCONTINUED | OUTPATIENT
Start: 2018-09-21 | End: 2018-09-21

## 2018-09-21 RX ORDER — PROPOFOL 10 MG/ML
INJECTION, EMULSION INTRAVENOUS CONTINUOUS PRN
Status: DISCONTINUED | OUTPATIENT
Start: 2018-09-21 | End: 2018-09-21

## 2018-09-21 RX ORDER — LIDOCAINE HYDROCHLORIDE 20 MG/ML
INJECTION, SOLUTION INFILTRATION; PERINEURAL PRN
Status: DISCONTINUED | OUTPATIENT
Start: 2018-09-21 | End: 2018-09-21

## 2018-09-21 RX ORDER — SODIUM CHLORIDE, SODIUM LACTATE, POTASSIUM CHLORIDE, CALCIUM CHLORIDE 600; 310; 30; 20 MG/100ML; MG/100ML; MG/100ML; MG/100ML
INJECTION, SOLUTION INTRAVENOUS CONTINUOUS
Status: DISCONTINUED | OUTPATIENT
Start: 2018-09-21 | End: 2018-09-21 | Stop reason: HOSPADM

## 2018-09-21 RX ORDER — BUPIVACAINE HYDROCHLORIDE 2.5 MG/ML
INJECTION, SOLUTION EPIDURAL; INFILTRATION; INTRACAUDAL PRN
Status: DISCONTINUED | OUTPATIENT
Start: 2018-09-21 | End: 2018-09-21 | Stop reason: HOSPADM

## 2018-09-21 RX ORDER — FENTANYL CITRATE 50 UG/ML
25-50 INJECTION, SOLUTION INTRAMUSCULAR; INTRAVENOUS
Status: DISCONTINUED | OUTPATIENT
Start: 2018-09-21 | End: 2018-09-21 | Stop reason: HOSPADM

## 2018-09-21 RX ADMIN — BUPIVACAINE HYDROCHLORIDE 2.5 ML: 2.5 INJECTION, SOLUTION EPIDURAL; INFILTRATION; INTRACAUDAL; PERINEURAL at 09:01

## 2018-09-21 RX ADMIN — LIDOCAINE HYDROCHLORIDE 2.5 ML: 10 INJECTION, SOLUTION EPIDURAL; INFILTRATION; INTRACAUDAL; PERINEURAL at 09:01

## 2018-09-21 RX ADMIN — PROPOFOL 100 MCG/KG/MIN: 10 INJECTION, EMULSION INTRAVENOUS at 08:44

## 2018-09-21 RX ADMIN — SODIUM CHLORIDE, SODIUM LACTATE, POTASSIUM CHLORIDE, AND CALCIUM CHLORIDE: 600; 310; 30; 20 INJECTION, SOLUTION INTRAVENOUS at 07:57

## 2018-09-21 RX ADMIN — LIDOCAINE HYDROCHLORIDE 80 MG: 20 INJECTION, SOLUTION INFILTRATION; PERINEURAL at 08:43

## 2018-09-21 RX ADMIN — PROPOFOL 50 MG: 10 INJECTION, EMULSION INTRAVENOUS at 08:43

## 2018-09-21 NOTE — ANESTHESIA PREPROCEDURE EVALUATION
Anesthesia Evaluation     . Pt has had prior anesthetic. Type: MAC and General    History of anesthetic complications   - PONV        ROS/MED HX    ENT/Pulmonary:  - neg pulmonary ROS     Neurologic:     (+)other neuro slight bilateral arm weakness    Cardiovascular:     (+) hypertension----. : . . . :. .       METS/Exercise Tolerance:  4 - Raking leaves, gardening   Hematologic:  - neg hematologic  ROS       Musculoskeletal:  - neg musculoskeletal ROS       GI/Hepatic:     (+) GERD Asymptomatic on medication,       Renal/Genitourinary:  - ROS Renal section negative       Endo:     (+) type II DM .      Psychiatric:  - neg psychiatric ROS       Infectious Disease:  - neg infectious disease ROS       Malignancy:      - no malignancy   Other:    (+) No chance of pregnancy C-spine cleared: N/A, no H/O Chronic Pain,no other significant disability   - neg other ROS                 Physical Exam  Normal systems: cardiovascular, pulmonary and dental    Airway   Mallampati: II  TM distance: >3 FB  Neck ROM: full    Dental     Cardiovascular   Rhythm and rate: regular and normal      Pulmonary    breath sounds clear to auscultation                    Anesthesia Plan      History & Physical Review      ASA Status:  2 .    NPO Status:  > 6 hours    Plan for MAC          Postoperative Care      Consents  Anesthetic plan, risks, benefits and alternatives discussed with:  Patient and Spouse..                          .

## 2018-09-21 NOTE — IP AVS SNAPSHOT
MRN:[id number]                      After Visit Summary   9/21/2018    Margie Mccoy    MRN: [id number]           Thank you!     Thank you for choosing [facility] for your care. Our goal is always to provide you with excellent care. Hearing back from our patients is one way we can continue to improve our services. Please take a few minutes to complete the written survey that you may receive in the mail after you visit with us. Thank you!        Patient Information     Date Of Birth          1955        About your hospital stay     You were admitted on:  September 21, 2018 You last received care in the:  [facility]    You were discharged on:  September 21, 2018       Who to Call     For medical emergencies, please call 911.  For non-urgent questions about your medical care, please call your primary care provider or clinic, None  For questions related to your surgery, please call your surgery clinic        Attending Provider     Provider Foreign Navarro MD [facility]       Primary Care Provider Fax #    Physician No Ref-Primary [phone]      After Care Instructions     Discharge Instructions       Review outpatient procedure discharge instructions with patient as directed by Provider            Dressing Change        IF leaking, remove and redress. Wash hands before and after and use gloves.            Ice to affected area       Ice pack to surgical site every 15 minutes per hour for 24 hours            No driving or operating machinery        until the day after procedure            Remove dressing - at 72 hours                  Your next 10 appointments already scheduled     Sep 28, 2018 10:45 AM CDT   Return Visit with Wagner Newton MD   [facility] ([facility]    [address]  [place] [address]   [phone]            Oct 23, 2018  9:15 AM CDT   Office Visit with PILAR Davis  CNP   [facility]    [address]  [place] [address] [phone]           Bring a current list of meds and any records pertaining to this visit. For Physicals, please bring immunization records and any forms needing to be filled out. Please arrive 10 minutes early to complete paperwork.              Further instructions from your care team       [facility]   Adult Discharge Orders & Instructions     For 24 hours after surgery    1. Get plenty of rest.  A responsible adult must stay with you for at least 24 hours after you leave the hospital.   2. Do not drive or use heavy equipment.  If you have weakness or tingling, don't drive or use heavy equipment until this feeling goes away.  3. Do not drink alcohol.  4. Avoid strenuous or risky activities.  Ask for help when climbing stairs.   5. You may feel lightheaded.  IF so, sit for a few minutes before standing.  Have someone help you get up.   6. If you have nausea (feel sick to your stomach): Drink only clear liquids such as apple juice, ginger ale, broth or 7-Up.  Rest may also help.  Be sure to drink enough fluids.  Move to a regular diet as you feel able.  7. You may have a slight fever. Call the doctor if your fever is over 101 F (38.3 C) (taken under the tongue) or lasts longer than 24 hours.  8. You may have a dry mouth, a sore throat, muscle aches or trouble sleeping.  These should go away after 24 hours.  9. Do not make important or legal decisions.   Call your doctor for any of the followin.  Signs of infection (fever, growing tenderness at the surgery site, a large amount of drainage or bleeding, severe pain, foul-smelling drainage, redness, swelling).    2. It has been over 8 to 10 hours since surgery and you are still not able to urinate (pass water).    3.  Headache for over 24 hours.    4.  Numbness, tingling or weakness the day after surgery (if you had  spinal anesthesia).  To contact a doctor, call    [phone]    Your follow up appointment is on Friday September 28 at 1045 am    Pending Results     No orders found from 9/19/2018 to 9/22/2018.            Admission Information     Date & Time Provider Department Dept. Phone    9/21/2018 Foreign Bauman MD [facility] [phone]      Your Vitals Were     Blood Pressure Pulse Temperature Respirations Weight Pulse Oximetry    174/82 (Cuff Size: Adult Regular) 61 97.3  F (36.3  C) (Temporal) 18 79.4 kg (175 lb) 99%    BMI (Body Mass Index)                   30.04 kg/m2           MyChart Information     [employer] gives you secure access to your electronic health record. If you see a primary care provider, you can also send messages to your care team and make appointments. If you have questions, please call your primary care clinic.  If you do not have a primary care provider, please call [phone] and they will assist you.        Care EveryWhere ID     This is your Care EveryWhere ID. This could be used by other organizations to access your [facility] medical records  [id number]        Equal Access to Services     AMAURY ALBERTO : Hadpeggy Guillen, vickie ayala, heidi garcia, kenya velazco. So [facility] [phone].    ATENCIÓN: Si habla español, tiene a suarez disposición servicios gratuitos de asistencia lingüística. Llame al [phone].    We comply with applicable federal civil rights laws and [place] laws. We do not discriminate on the basis of race, color, national origin, age, disability, sex, sexual orientation, or gender identity.               Review of your medicines      UNREVIEWED medicines. Ask your doctor about these medicines        Dose / Directions    doxycycline 100 MG capsule   Commonly known as:  VIBRAMYCIN   Used for:  Rosacea        Dose:  100 mg   Take 1 capsule (100 mg) by mouth daily As needed   Quantity:  90 capsule    Refills:  3       metFORMIN 500 MG 24 hr tablet   Commonly known as:  GLUCOPHAGE-XR   Used for:  Type 2 diabetes mellitus not at goal (H)        Dose:  500 mg   Take 1 tablet (500 mg) by mouth daily   Quantity:  90 tablet   Refills:  1       metoprolol succinate 50 MG 24 hr tablet   Commonly known as:  TOPROL-XL   Used for:  Hypertension, unspecified type        Dose:  50 mg   Take 1 tablet (50 mg) by mouth daily   Quantity:  90 tablet   Refills:  3       triamterene-hydrochlorothiazide 37.5-25 MG per capsule   Commonly known as:  DYAZIDE   Used for:  Hypertension, unspecified type        Dose:  1 capsule   Take 1 capsule by mouth daily   Quantity:  90 capsule   Refills:  3         CONTINUE these medicines which have NOT CHANGED        Dose / Directions    B-D ULTRA-FINE 33 LANCETS Misc        As directed once daily. DX: E11.9 Diabetes Mellitus Type 2 controlled.   Refills:  0       FIFTY50 GLUCOSE METER 2.0 w/Device Kit        Dispense meter, test strips, lancets covered by pt ins. E11.65 NIDDM type II, uncontrolled - Test 2 times/day. Reason: High A1C   Refills:  0       [employer] TEST STRIPS test strip   Generic drug:  blood glucose monitoring        Dispense item covered by pt ins. E11.65 NIDDM type II, uncontrolled - Test 2 times/day. Reason: High A1C   Refills:  0                Protect others around you: Learn how to safely use, store and throw away your medicines at www.disposemymeds.org.             Medication List: This is a list of all your medications and when to take them. Check marks below indicate your daily home schedule. Keep this list as a reference.      Medications           Morning Afternoon Evening Bedtime As Needed    B-D ULTRA-FINE 33 LANCETS Misc   As directed once daily. DX: E11.9 Diabetes Mellitus Type 2 controlled.                                doxycycline 100 MG capsule   Commonly known as:  VIBRAMYCIN   Take 1 capsule (100 mg) by mouth daily As needed                                 FIFTY50 GLUCOSE METER 2.0 w/Device Kit   Dispense meter, test strips, lancets covered by pt ins. E11.65 NIDDM type II, uncontrolled - Test 2 times/day. Reason: High A1C                                [employer] TEST STRIPS test strip   Dispense item covered by pt ins. E11.65 NIDDM type II, uncontrolled - Test 2 times/day. Reason: High A1C   Generic drug:  blood glucose monitoring                                metFORMIN 500 MG 24 hr tablet   Commonly known as:  GLUCOPHAGE-XR   Take 1 tablet (500 mg) by mouth daily                                metoprolol succinate 50 MG 24 hr tablet   Commonly known as:  TOPROL-XL   Take 1 tablet (50 mg) by mouth daily                                triamterene-hydrochlorothiazide 37.5-25 MG per capsule   Commonly known as:  DYAZIDE   Take 1 capsule by mouth daily                                          More Information        Treating Trigger Finger     The tendon sheath is opened to release the tendon. Once the tendon can move freely again, the finger can bend and straighten more normally.     Trigger finger occurs when the tissue inside your finger or thumb becomes inflamed. Mild cases can be treated without surgery. If the problem is severe, surgery may be needed. Your healthcare provider will talk with you about your options.  Nonsurgical treatment  For mild symptoms, your healthcare provider may have you rest the finger or thumb. You may also be told to take anti-inflammatory medicines. These include ibuprofen or aspirin. You may be given an injection of medicine in the base of the finger or thumb. This typically is a steroid, such as cortisone.  Surgery  If nonsurgical treatments don t ease your symptoms, you may need surgery. A tendon is a cordlike fiber that attaches muscle to bone and allows joints to bend. The tendon is surrounded by a protective cover called a sheath. During surgery, the sheath in your finger or thumb is opened to enlarge the space and release the swollen  tendon. This allows the finger or thumb to bend and straighten normally. Surgery takes about 20 minutes. It can often be done using a local anesthetic. You may also be sedated. You will likely be able to go home the same day. Your hand will be wrapped in a soft bandage. You may need to wear a plaster splint for a short time to keep the finger or thumb still as it heals. The stitches will be removed in about 2 weeks. Your healthcare provider will talk with you about the risks and benefits of surgery.  Date Last Reviewed: 1/1/2018 2000-2017 [employer]. [address] [place], [place] [id number]. All rights reserved. This information is not intended as a substitute for professional medical care. Always follow your healthcare professional's instructions.                What is Trigger Finger?  Trigger finger is an inflammation of tissue inside your finger or thumb. It is also called tenosynovitis (ten-oh-sin-oh-VY-tis). Tendons (cordlike fibers that attach muscle to bone and allow you to bend the joints) become swollen. So does the synovium (a slick membrane that allows the tendons to move easily). This makes it hard to straighten the finger or thumb.    Causes  Repeated use of a tool with strong gripping, such as a drill or wrench, can irritate and inflame the tendons and the synovium. It is also more common in certain medical conditions, such as rheumatoid arthritis, gout, and diabetes. But often the cause of trigger finger is unknown.  Inside your finger  Tendons connect muscles in your forearm to the bones in your fingers. The tendons in each finger are surrounded by a protective tendon sheath. This sheath is lined with synovium, which produces a fluid that allows the tendons to slide easily when you bend and straighten the finger. If a tendon is irritated, it becomes inflamed.  When a tendon is inflamed  When a tendon is inflamed, it causes the lining of the tendon sheath to swell and thicken. Or  the tendon itself may thicken. Then the sheath pinches the tendon. The tendon can then no longer slide easily inside the sheath. When you straighten your finger, the tendon sticks or  locks  as it tries to squeeze back through the sheath.    Symptoms  The first sign of trigger finger may be pain where the finger or thumb joins the palm. You may also notice some swelling. As the tendon becomes more inflamed, the finger may start to catch when you try to straighten or bend it. When the locked tendon releases, the finger jumps, as if you were releasing the trigger of a gun. This further irritates the tendon. It may set up a cycle of catching and swelling.   Date Last Reviewed: 1/1/2018 2000-2017 [employer]. [address] [place] [address], [place] [address]. All rights reserved. This information is not intended as a substitute for professional medical care. Always follow your healthcare professional's instructions.

## 2018-09-21 NOTE — IP AVS SNAPSHOT
[facility]    [address]    [place] [address]    Phone:  [phone]    Fax:  [phone]                                       After Visit Summary   9/21/2018    Margie Mccoy    MRN: [id number]           After Visit Summary Signature Page     I have received my discharge instructions, and my questions have been answered. I have discussed any challenges I see with this plan with the nurse or doctor.    ..........................................................................................................................................  Patient/Patient Representative Signature      ..........................................................................................................................................  Patient Representative Print Name and Relationship to Patient    ..................................................               ................................................  Date                                   Time    ..........................................................................................................................................  Reviewed by Signature/Title    ...................................................              ..............................................  Date                                               Time          22EPIC Rev 08/18

## 2018-09-21 NOTE — OP NOTE
Procedure Date: 2018      PREOPERATIVE DIAGNOSIS:  Right trigger thumb.      POSTOPERATIVE DIAGNOSIS:  Right trigger thumb.      PROCEDURE:  Right trigger thumb release.       SURGEON:  Chirag Castro MD.      ASSISTANT:  .      ANESTHESIA:  Local with sedation.      INDICATIONS:  A 63-year-old female presenting with right thumb trigger digit.  She failed nonoperative treatment and was offered surgical treatment.  All of the above risks, complications and benefits reviewed and she elected to proceed.      DESCRIPTION OF PROCEDURE:  Following medical clearance,  the patient was brought to the operating room and placed on the operating table.  A pneumatic tourniquet was placed about the right upper extremity and the right upper extremity was prepped and draped in normal sterile manner.  A timeout was performed confirming surgical site, patient and procedure.   Right upper extremity was elevated, exsanguinated, tourniquet inflated to 250  mmHg.  A modified Brunner incision was planned over the A1 pulley of the thumb.  This was infiltrated with a local anesthetic without epinephrine.  Incision was made, carried sharply down through skin and subcutaneous tissue with careful coverage performed. The A1 pulley was identified and sharply incised under direct vision with care to avoid injury to neurovascular structures.  Once complete, the wounds were irrigated, tourniquet deflated and checked to make sure circulation returned promptly.  Hemostasis obtained with direct pressure.  Skin was closed with _sutures.  The patient was brought to recovery in stable condition.         CHIRAG CASTRO MD             D: 2018   T: 2018   MT: JOESPH      Name:     JEFF CHAPA   MRN:      [id number]        Account:        [id number]   :      1955           Procedure Date: 2018      Document: [id number]

## 2018-09-21 NOTE — H&P (VIEW-ONLY)
----------------- PREOPERATIVE EXAM ------------------  9/12/2018    SUBJECTIVE:  Margie Mccoy is a 63 year old female here for preoperative optimization.    I was asked to see Margie Mcocy by Dr. Newton for preoperative optimization     Date of Surgery: September 21, 2018  Type of Surgery: Trigger finger release  Surgeon: Dr Newton  Hospital:  [facility]    HPI: Reports overall health is been good, denies any cough fever congestion.  No tobacco history.  No cardiac or respiratory condition history  Blood pressure is been normotensive on current medications.  Has not started Metformin yet for pre-diabetes.  Will plan to start after procedure.  Able to tolerate > 4 METS        Fever/Chills or other infectious symptoms in past month:  None  >10lb weight loss in past two months:  None    Patient Active Problem List    Diagnosis Date Noted     Type 2 diabetes mellitus not at goal (H) 08/13/2018     Priority: Medium     Rosacea 08/13/2018     Priority: Medium     Hypertension 08/08/2018     Priority: Medium     Health Care Home 01/11/2017     Priority: Medium     Last Assessment & Plan:   Formatting of this note may be different from the original.  If you have any questions or concerns please contact:  Your Care Team at [facility] :[phone]  Primary Care Provider: Yanira Celis MD  RN Coordinator: RACHEL House  CareLine at [phone] or [phone] after hours and on weekends.     Care Coordination  (Community Resources, Specialists, Home Health Agency, Caregiver, Supply Vendor, )     Contact Name PAUL Obtained? Phone Number Role in Care Comments    Lacy RAMOS                Action Plan    Margie's Chosen Goal:  Diabetic control    Detailed Action Steps to Achieve Goal & Who Will Complete:  Pt will see a dietician, and have diabetic education. The pt will establish trusting relationship with RN.    Confidence Level:  On a scale of 1-10, Margie's confidence level to achieve  this goal is 7    Date Goal Achieved     Additional Patient Instructions    1. Pt will start metformin 500 mg BID  2. Pt will see dietician  3. Pt will see diabetic nurse educator  4. Pt will call nurse care coordinator with questions related to DM    Follow-Up    Lacy will follow-up with Margie by phone on 1/11/17.         Past Medical History:   Diagnosis Date     Rosacea 8/13/2018       Past Surgical History:   Procedure Laterality Date     COLONOSCOPY  2007    neg     FUSION CERVICAL ANTERIOR TWO LEVELS  2014    [facility]--Dr Gray     TUBAL LIGATION      22 years old       History reviewed. No pertinent family history.    Social History   Substance Use Topics     Smoking status: Never Smoker     Smokeless tobacco: Never Used     Alcohol use Yes      Comment: 2 drinks per month        Current Outpatient Prescriptions   Medication Sig Dispense Refill     B-D ULTRA-FINE 33 LANCETS MISC As directed once daily. DX: E11.9 Diabetes Mellitus Type 2 controlled.       blood glucose monitoring ([employer] TEST STRIPS) test strip Dispense item covered by pt ins. E11.65 NIDDM type II, uncontrolled - Test 2 times/day. Reason: High A1C       Blood Glucose Monitoring Suppl (FIFTY50 GLUCOSE METER 2.0) w/Device KIT Dispense meter, test strips, lancets covered by pt ins. E11.65 NIDDM type II, uncontrolled - Test 2 times/day. Reason: High A1C       doxycycline (VIBRAMYCIN) 100 MG capsule Take 1 capsule (100 mg) by mouth daily As needed 90 capsule 3     metFORMIN (GLUCOPHAGE-XR) 500 MG 24 hr tablet Take 1 tablet (500 mg) by mouth daily 90 tablet 1     metoprolol succinate (TOPROL-XL) 50 MG 24 hr tablet Take 1 tablet (50 mg) by mouth daily 90 tablet 3     triamterene-hydrochlorothiazide (DYAZIDE) 37.5-25 MG per capsule Take 1 capsule by mouth daily 90 capsule 3       Allergies:  Allergies   Allergen Reactions     Demeclocycline Nausea     Needs to take with food     Hibiclens      dermatitis     Oxycodone Nausea     Tetracyclines GI  Disturbance     Needs to take with food       ROS:    Surgical:  patient denies previous complications from prior surgeries including but not limited to prolonged bleeding, anesthesia complications, dysrhythmias, surgical wound infections, or prolonged hospital stay.    Denies family hx of bleeding tendencies, anesthesia complications, or other problems with surgery.     -------------------------------------------------------------    PHYSICAL EXAM:  /80 (BP Location: Right arm, Patient Position: Sitting, Cuff Size: Adult Regular)  Pulse 54  Temp 97  F (36.1  C) (Tympanic)  Wt 175 lb 6.4 oz (79.6 kg)  SpO2 98%  BMI 30.11 kg/m2    EXAM:  General Appearance: Pleasant, alert, appropriate appearance for age. No acute distress  Head Exam: Normal. Normocephalic, atraumatic.  Eyes: PERRL, EOMI  Ears: Normal TM's bilaterally. Normal auditory canals and external ears.   OroPharynx: Normal buccal mucosa. Normal pharynx.  Neck: Supple, no masses or nodes, no lymphadenopathy.  No thyromegaly.  Lungs: Normal chest wall and respirations. Clear to auscultation, no wheezes or crackles.  Cardiovascular: Regular rate and rhythm. S1, S2, no murmurs.  Gastrointestinal: Soft, nontender, no abnormal masses or organomegaly. BS normal   Musculoskeletal: No edema.  Skin: no concerning or new rashes.  Neurologic Exam: CN 2-12 grossly intact.  Normal gait.    Psychiatric Exam: Alert and oriented, appropriate affect.      EKG:  NSR see internal medicine final interpretation in epic  ---------------------------------------------------------------  LABS  Results for orders placed or performed during the hospital encounter of 08/17/18   XR Finger Right G/E 2 Views    Narrative    PROCEDURE: XR FINGER RT G/E 2 VW 8/17/2018 11:17 AM    HISTORY: ; Trigger finger of right thumb    COMPARISONS: None.    TECHNIQUE: 3 views.    FINDINGS: No acute fracture or dislocation is seen. There is no focal  bone lesion.    There is mild degenerative  change in the first carpal metacarpal  joint.         Impression    IMPRESSION: No acute abnormality.    RENUKA CASTRO MD       ASSESSEMENT AND PLAN:    Optimized and ready for schedule procedure    Has stopped aspirin and NSAIDs  Directed to call outpatient surgery center for specific instructions prior to procedure    (Z01.818) Preoperative examination  (primary encounter diagnosis)    Plan: CBC and Differential            (M65.30) Trigger finger, acquired    Plan: CBC and Differential            (I10) Hypertension, unspecified type    Metabolic syndrome--A1c improving will start metformin extended release due for A1c November 2018    PRE OP RECOMMENDATIONS:  Patient is on chronic pain medications none  Patient is on antiplatlet/anticoagulation medication none  Other medications that need adjustment perioperatively none    Other:  Patient was advised to call our office and the surgical services with any change in condition or new symptoms if they were to develop between today and their surgical date.  Especially any cardiopulmonary symptoms or symptoms concerning for an infection.

## 2018-09-21 NOTE — ANESTHESIA POSTPROCEDURE EVALUATION
Patient: Margie Mccoy    Procedure(s):  Right Thumb Trigger Release - Wound Class: I-Clean    Diagnosis:right trigger thumb  Diagnosis Additional Information: No value filed.    Anesthesia Type:  MAC    Note:  Anesthesia Post Evaluation    Patient location during evaluation: Phase 2  Patient participation: Able to fully participate in evaluation  Level of consciousness: awake and alert  Pain management: adequate  Airway patency: patent  Cardiovascular status: acceptable  Respiratory status: acceptable  Hydration status: acceptable  PONV: none             Last vitals:  Vitals:    09/21/18 0735   BP: 174/82   Pulse: 61   Resp: 18   Temp: 97.3  F (36.3  C)   SpO2: 99%         Electronically Signed By: PILAR KING CRNA  September 21, 2018  9:13 AM

## 2018-09-21 NOTE — DISCHARGE INSTRUCTIONS
[facility]   Adult Discharge Orders & Instructions     For 24 hours after surgery    1. Get plenty of rest.  A responsible adult must stay with you for at least 24 hours after you leave the hospital.   2. Do not drive or use heavy equipment.  If you have weakness or tingling, don't drive or use heavy equipment until this feeling goes away.  3. Do not drink alcohol.  4. Avoid strenuous or risky activities.  Ask for help when climbing stairs.   5. You may feel lightheaded.  IF so, sit for a few minutes before standing.  Have someone help you get up.   6. If you have nausea (feel sick to your stomach): Drink only clear liquids such as apple juice, ginger ale, broth or 7-Up.  Rest may also help.  Be sure to drink enough fluids.  Move to a regular diet as you feel able.  7. You may have a slight fever. Call the doctor if your fever is over 101 F (38.3 C) (taken under the tongue) or lasts longer than 24 hours.  8. You may have a dry mouth, a sore throat, muscle aches or trouble sleeping.  These should go away after 24 hours.  9. Do not make important or legal decisions.   Call your doctor for any of the followin.  Signs of infection (fever, growing tenderness at the surgery site, a large amount of drainage or bleeding, severe pain, foul-smelling drainage, redness, swelling).    2. It has been over 8 to 10 hours since surgery and you are still not able to urinate (pass water).    3.  Headache for over 24 hours.    4.  Numbness, tingling or weakness the day after surgery (if you had spinal anesthesia).  To contact a doctor, call    [phone]    Your follow up appointment is on  at 1045 am

## 2018-09-21 NOTE — BRIEF OP NOTE
[facility] Brief Operative Note    Pre-operative diagnosis: right trigger thumb   Post-operative diagnosis right trigger thumb     Procedure: Procedure(s):  Right Thumb Trigger Release - Wound Class: I-Clean   Surgeon(s): Surgeon(s) and Role:     * Wagner Newton MD - Primary   Estimated blood loss: * No values recorded between 9/21/2018  8:54 AM and 9/21/2018  9:08 AM *    Specimens: * No specimens in log *   Findings: Trigger thumb

## 2018-09-21 NOTE — ANESTHESIA CARE TRANSFER NOTE
Patient: Margie Mccoy    Procedure(s):  Right Thumb Trigger Release - Wound Class: I-Clean    Diagnosis: right trigger thumb  Diagnosis Additional Information: No value filed.    Anesthesia Type:   MAC     Note:  Airway :Nasal Cannula  Patient transferred to:Phase II  Handoff Report: Identifed the Patient, Identified the Reponsible Provider, Reviewed the pertinent medical history, Discussed the surgical course, Reviewed Intra-OP anesthesia mangement and issues during anesthesia, Set expectations for post-procedure period and Allowed opportunity for questions and acknowledgement of understanding      Vitals: (Last set prior to Anesthesia Care Transfer)    CRNA VITALS  9/21/2018 0839 - 9/21/2018 0912      9/21/2018             Resp Rate (set): 10                Electronically Signed By: PILAR KIGN CRNA  September 21, 2018  9:12 AM

## 2018-10-02 ENCOUNTER — OFFICE VISIT (OUTPATIENT)
Dept: ORTHOPEDICS | Facility: OTHER | Age: 63
End: 2018-10-02
Attending: ORTHOPAEDIC SURGERY
Payer: COMMERCIAL

## 2018-10-02 DIAGNOSIS — Z00.00 ROUTINE GENERAL MEDICAL EXAMINATION AT A HEALTH CARE FACILITY: Primary | ICD-10-CM

## 2018-10-02 PROCEDURE — 99024 POSTOP FOLLOW-UP VISIT: CPT | Performed by: ORTHOPAEDIC SURGERY

## 2018-10-02 PROCEDURE — G0463 HOSPITAL OUTPT CLINIC VISIT: HCPCS

## 2018-10-02 NOTE — MR AVS SNAPSHOT
After Visit Summary   10/2/2018    Margie Mccoy    MRN: [id number]           Patient Information     Date Of Birth          1955        Visit Information        Provider Department      10/2/2018 2:30 PM Bertin Nunn MD [facility]        Today's Diagnoses     Routine general medical examination at a health care facility    -  1       Follow-ups after your visit        Your next 10 appointments already scheduled     Oct 23, 2018  9:15 AM CDT   Office Visit with PILAR Davis CNP   [facility] ([facility])    [address]  [place] [address]   [phone]           Bring a current list of meds and any records pertaining to this visit. For Physicals, please bring immunization records and any forms needing to be filled out. Please arrive 10 minutes early to complete paperwork.              Who to contact     If you have questions or need follow up information about today's clinic visit or your schedule please contact [facility] directly at [phone].  Normal or non-critical lab and imaging results will be communicated to you by [employer], letter or phone within 4 business days after the clinic has received the results. If you do not hear from us within 7 days, please contact the clinic through [email or web address] or phone. If you have a critical or abnormal lab result, we will notify you by phone as soon as possible.  Submit refill requests through [employer] or call your pharmacy and they will forward the refill request to us. Please allow 3 business days for your refill to be completed.          Additional Information About Your Visit        MyChart Information     [employer] gives you secure access to your electronic health record. If you see a primary care provider, you can also send messages to your care team and make appointments. If you have questions, please call your primary care clinic.  If you  do not have a primary care provider, please call [phone] and they will assist you.        Care EveryWhere ID     This is your Care EveryWhere ID. This could be used by other organizations to access your [facility] medical records  [id number]         Blood Pressure from Last 3 Encounters:   09/21/18 134/68   09/12/18 122/80   08/17/18 120/80    Weight from Last 3 Encounters:   09/21/18 79.4 kg (175 lb)   09/12/18 79.6 kg (175 lb 6.4 oz)   08/17/18 77.1 kg (170 lb)              Today, you had the following     No orders found for display       Primary Care Provider Fax #    Physician No Ref-Primary [phone]       No address on file        Equal Access to Services     PEYMAN ALBERTO : Suma baileyo Sobryan, waaxda luqadaha, qaybta kaalmada adephyliciayataqueria, kenya cloud . So [facility] [phone].    ATENCIÓN: Si habla español, tiene a suarez disposición servicios gratuitos de asistencia lingüística. Llame al [phone].    We comply with applicable federal civil rights laws and [place] laws. We do not discriminate on the basis of race, color, national origin, age, disability, sex, sexual orientation, or gender identity.            Thank you!     Thank you for choosing [facility] [place]  for your care. Our goal is always to provide you with excellent care. Hearing back from our patients is one way we can continue to improve our services. Please take a few minutes to complete the written survey that you may receive in the mail after your visit with us. Thank you!             Your Updated Medication List - Protect others around you: Learn how to safely use, store and throw away your medicines at www.disposemymeds.org.          This list is accurate as of 10/2/18 11:59 PM.  Always use your most recent med list.                   Brand Name Dispense Instructions for use Diagnosis    B-D ULTRA-FINE 33 LANCETS Misc      As directed once daily. DX: E11.9 Diabetes Mellitus Type 2  controlled.        doxycycline 100 MG capsule    VIBRAMYCIN    90 capsule    Take 1 capsule (100 mg) by mouth daily As needed    Rosacea       FIFTY50 GLUCOSE METER 2.0 w/Device Kit      Dispense meter, test strips, lancets covered by pt ins. E11.65 NIDDM type II, uncontrolled - Test 2 times/day. Reason: High A1C        [employer] TEST STRIPS test strip   Generic drug:  blood glucose monitoring      Dispense item covered by pt ins. E11.65 NIDDM type II, uncontrolled - Test 2 times/day. Reason: High A1C        metFORMIN 500 MG 24 hr tablet    GLUCOPHAGE-XR    90 tablet    Take 1 tablet (500 mg) by mouth daily    Type 2 diabetes mellitus not at goal (H)       metoprolol succinate 50 MG 24 hr tablet    TOPROL-XL    90 tablet    Take 1 tablet (50 mg) by mouth daily    Hypertension, unspecified type       triamterene-hydrochlorothiazide 37.5-25 MG per capsule    DYAZIDE    90 capsule    Take 1 capsule by mouth daily    Hypertension, unspecified type

## 2018-11-19 ENCOUNTER — TRANSFERRED RECORDS (OUTPATIENT)
Dept: HEALTH INFORMATION MANAGEMENT | Facility: OTHER | Age: 63
End: 2018-11-19

## 2019-08-27 DIAGNOSIS — I10 HYPERTENSION, UNSPECIFIED TYPE: Primary | ICD-10-CM

## 2019-09-03 RX ORDER — TRIAMTERENE AND HYDROCHLOROTHIAZIDE 37.5; 25 MG/1; MG/1
1 CAPSULE ORAL DAILY
Qty: 90 CAPSULE | Refills: 0 | Status: SHIPPED | OUTPATIENT
Start: 2019-09-03 | End: 2019-10-10

## 2019-09-03 NOTE — TELEPHONE ENCOUNTER
Given limited fill--needs to see someone for HTN,DM and labs  For further fills    Sravani Carrillo, APRN CNP   September 3, 2019

## 2019-09-03 NOTE — TELEPHONE ENCOUNTER
Called and spoke to Patient after verifying last name and date of birth. Pt notified of Sravani Carrillo's response and recommendation. Pt verbalized plan to call back and schedule appointment. Renetta Fernandes RN .............. 9/3/2019  2:40 PM

## 2019-09-03 NOTE — TELEPHONE ENCOUNTER
[facility] in [place] sent Rx request for the following:      TRIAMT-HCTZ 37.5-25MG@ 37.5-25 CAP  Sig: Take 1 capsule by mouth hunter  Last Prescription Date:   8/8/18  Last Fill Qty/Refills:         90, R-3    Last Office Visit:              9/12/18 (Pre-op [facility])   Future Office visit:           None.  Routing refill request to provider for review/approval because:  Diuretics (Including Combos) Protocol Failed9/3 1:07 PM   Normal serum creatinine on file in past 12 months    Normal serum potassium on file in past 12 months    Normal serum sodium on file in past 12 months     Pt due for annual exam and labs. No PCP listed. Pt saw Sravani Carrillo one time 1 year ago.    Unable to complete prescription refill per RN Medication Refill Policy. Renetta Fernandes RN .............. 9/3/2019  1:46 PM

## 2019-10-10 ENCOUNTER — OFFICE VISIT (OUTPATIENT)
Dept: FAMILY MEDICINE | Facility: OTHER | Age: 64
End: 2019-10-10
Attending: NURSE PRACTITIONER
Payer: COMMERCIAL

## 2019-10-10 VITALS
DIASTOLIC BLOOD PRESSURE: 86 MMHG | TEMPERATURE: 97 F | HEART RATE: 60 BPM | WEIGHT: 172 LBS | RESPIRATION RATE: 12 BRPM | HEIGHT: 64 IN | SYSTOLIC BLOOD PRESSURE: 132 MMHG | BODY MASS INDEX: 29.37 KG/M2

## 2019-10-10 DIAGNOSIS — Z12.31 ENCOUNTER FOR SCREENING MAMMOGRAM FOR BREAST CANCER: ICD-10-CM

## 2019-10-10 DIAGNOSIS — E11.9 TYPE 2 DIABETES MELLITUS NOT AT GOAL (H): Primary | ICD-10-CM

## 2019-10-10 DIAGNOSIS — Z79.899 ENCOUNTER FOR MEDICATION REVIEW: ICD-10-CM

## 2019-10-10 DIAGNOSIS — N76.0 VULVOVAGINITIS: ICD-10-CM

## 2019-10-10 DIAGNOSIS — I10 HYPERTENSION, UNSPECIFIED TYPE: ICD-10-CM

## 2019-10-10 DIAGNOSIS — L30.9 VULVAR DERMATITIS: ICD-10-CM

## 2019-10-10 DIAGNOSIS — Z23 NEED FOR PNEUMOCOCCAL VACCINE: ICD-10-CM

## 2019-10-10 DIAGNOSIS — Z23 NEEDS FLU SHOT: ICD-10-CM

## 2019-10-10 DIAGNOSIS — N89.8 VAGINAL ITCHING: ICD-10-CM

## 2019-10-10 LAB
ANION GAP SERPL CALCULATED.3IONS-SCNC: 10 MMOL/L (ref 3–14)
BUN SERPL-MCNC: 14 MG/DL (ref 7–25)
CALCIUM SERPL-MCNC: 9.5 MG/DL (ref 8.6–10.3)
CHLORIDE SERPL-SCNC: 99 MMOL/L (ref 98–107)
CHOLEST SERPL-MCNC: 196 MG/DL
CO2 SERPL-SCNC: 30 MMOL/L (ref 21–31)
CREAT SERPL-MCNC: 0.85 MG/DL (ref 0.6–1.2)
GFR SERPL CREATININE-BSD FRML MDRD: 67 ML/MIN/{1.73_M2}
GLUCOSE SERPL-MCNC: 137 MG/DL (ref 70–105)
HBA1C MFR BLD: 7.7 % (ref 4–6)
HDLC SERPL-MCNC: 49 MG/DL (ref 23–92)
LDLC SERPL CALC-MCNC: 121 MG/DL
NONHDLC SERPL-MCNC: 147 MG/DL
POTASSIUM SERPL-SCNC: 3.4 MMOL/L (ref 3.5–5.1)
SODIUM SERPL-SCNC: 139 MMOL/L (ref 134–144)
SPECIMEN SOURCE: NORMAL
TRIGL SERPL-MCNC: 129 MG/DL
WET PREP SPEC: NORMAL

## 2019-10-10 PROCEDURE — 36415 COLL VENOUS BLD VENIPUNCTURE: CPT | Mod: ZL | Performed by: NURSE PRACTITIONER

## 2019-10-10 PROCEDURE — 87210 SMEAR WET MOUNT SALINE/INK: CPT | Mod: ZL | Performed by: NURSE PRACTITIONER

## 2019-10-10 PROCEDURE — 80061 LIPID PANEL: CPT | Mod: ZL | Performed by: NURSE PRACTITIONER

## 2019-10-10 PROCEDURE — G0463 HOSPITAL OUTPT CLINIC VISIT: HCPCS | Mod: 25

## 2019-10-10 PROCEDURE — G0009 ADMIN PNEUMOCOCCAL VACCINE: HCPCS

## 2019-10-10 PROCEDURE — G0463 HOSPITAL OUTPT CLINIC VISIT: HCPCS

## 2019-10-10 PROCEDURE — 80048 BASIC METABOLIC PNL TOTAL CA: CPT | Mod: ZL | Performed by: NURSE PRACTITIONER

## 2019-10-10 PROCEDURE — 83036 HEMOGLOBIN GLYCOSYLATED A1C: CPT | Mod: ZL | Performed by: NURSE PRACTITIONER

## 2019-10-10 PROCEDURE — 99214 OFFICE O/P EST MOD 30 MIN: CPT | Performed by: NURSE PRACTITIONER

## 2019-10-10 PROCEDURE — G0008 ADMIN INFLUENZA VIRUS VAC: HCPCS

## 2019-10-10 PROCEDURE — 90670 PCV13 VACCINE IM: CPT

## 2019-10-10 PROCEDURE — 90662 IIV NO PRSV INCREASED AG IM: CPT

## 2019-10-10 RX ORDER — FLUCONAZOLE 150 MG/1
150 TABLET ORAL DAILY
Qty: 4 TABLET | Refills: 0 | Status: SHIPPED | OUTPATIENT
Start: 2019-10-10 | End: 2020-01-02

## 2019-10-10 RX ORDER — METFORMIN HCL 500 MG
500 TABLET, EXTENDED RELEASE 24 HR ORAL DAILY
Qty: 90 TABLET | Refills: 4 | Status: CANCELLED | OUTPATIENT
Start: 2019-10-10

## 2019-10-10 RX ORDER — TRIAMTERENE AND HYDROCHLOROTHIAZIDE 37.5; 25 MG/1; MG/1
1 CAPSULE ORAL DAILY
Qty: 90 CAPSULE | Refills: 4 | Status: SHIPPED | OUTPATIENT
Start: 2019-10-10 | End: 2021-07-22

## 2019-10-10 RX ORDER — BLOOD-GLUCOSE METER
KIT MISCELLANEOUS
Qty: 300 EACH | Refills: 4 | Status: SHIPPED | OUTPATIENT
Start: 2019-10-10 | End: 2022-10-12

## 2019-10-10 RX ORDER — BETAMETHASONE DIPROPIONATE 0.5 MG/G
CREAM TOPICAL 2 TIMES DAILY
Qty: 45 G | Refills: 0 | Status: SHIPPED | OUTPATIENT
Start: 2019-10-10 | End: 2020-01-02

## 2019-10-10 RX ORDER — METOPROLOL SUCCINATE 25 MG/1
25 TABLET, EXTENDED RELEASE ORAL DAILY
Qty: 90 TABLET | Refills: 4 | Status: SHIPPED | OUTPATIENT
Start: 2019-10-10 | End: 2021-07-22

## 2019-10-10 RX ORDER — ROSUVASTATIN CALCIUM 10 MG/1
10 TABLET, COATED ORAL DAILY
Qty: 90 TABLET | Refills: 3 | Status: SHIPPED | OUTPATIENT
Start: 2019-10-10 | End: 2020-01-30

## 2019-10-10 ASSESSMENT — MIFFLIN-ST. JEOR: SCORE: 1297.25

## 2019-10-10 NOTE — NURSING NOTE
Patient presents to clinic with concerns about vaginal itching. She states that this started about 6 weeks ago and had did OTC Monistat with no relief about 6 weeks also.  Asuncion White LPN ....................  10/10/2019   2:35 PM

## 2019-10-10 NOTE — LETTER
October 10, 2019      Margie Mccoy  [address]  [place] [address]        Dear ,      Your labs show normal indicators of kidney function, your cholesterol and lipids are above target range for diabetes and I would recommend starting a high intensity statin such as rosuvastatin or a atorvastatin daily due to his cardiovascular risks.  Your vascular risk score is very high due to your history of diabetes, hypertension and lipidemia.  I will send the medication to the pharmacy so you can get started daily and would like you to come in in 3 months for an A1c and cholesterol and lipids recheck on medication adjustment.  I have sent a prescription for metformin to your pharmacy, since you are tolerating the metformin well I would recommend maximum dose starting with 500 mg twice a day before meals, increasing the dose by 1 tab a day up to maximum 2 tabs twice a day before meals.  Would like to discuss diabetic control in 3 months on that dose of metformin with a plan going forward to maximize control and reduce vascular risks such as stroke and coronary artery disease.      The 10-year ASCVD risk score (Salemangel luis DEVI Jr., et al., 2013) is: 16.8%    Values used to calculate the score:      Age: 64 years      Sex: Female      Is Non- : No      Diabetic: Yes      Tobacco smoker: No      Systolic Blood Pressure: 132 mmHg      Is BP treated: Yes      HDL Cholesterol: 49 mg/dL      Total Cholesterol: 196 mg/dL      Results for orders placed or performed in visit on 10/10/19   Basic Metabolic Panel   Result Value Ref Range    Sodium 139 134 - 144 mmol/L    Potassium 3.4 (L) 3.5 - 5.1 mmol/L    Chloride 99 98 - 107 mmol/L    Carbon Dioxide 30 21 - 31 mmol/L    Anion Gap 10 3 - 14 mmol/L    Glucose 137 (H) 70 - 105 mg/dL    Urea Nitrogen 14 7 - 25 mg/dL    Creatinine 0.85 0.60 - 1.20 mg/dL    GFR Estimate 67 >60 mL/min/[1.73_m2]    GFR Estimate If Black 81 >60 mL/min/[1.73_m2]    Calcium 9.5 8.6  - 10.3 mg/dL   Lipid Panel   Result Value Ref Range    Cholesterol 196 <200 mg/dL    Triglycerides 129 <150 mg/dL    HDL Cholesterol 49 23 - 92 mg/dL    LDL Cholesterol Calculated 121 (H) <100 mg/dL    Non HDL Cholesterol 147 (H) <130 mg/dL   Hemoglobin A1c   Result Value Ref Range    Hemoglobin A1C 7.7 (H) 4.0 - 6.0 %   Wet Prep, Genital   Result Value Ref Range    Specimen Description Vagina     Wet Prep No yeast seen     Wet Prep No Trichomonas seen     Wet Prep No clue cells seen              If you have any questions or concerns, please call the clinic at the number listed above.       Sincerely,        PILAR Davis CNP

## 2019-10-10 NOTE — PROGRESS NOTES
Nursing Notes:   Asuncion White LPN  10/10/2019  2:53 PM  Signed  Patient presents to clinic with concerns about vaginal itching. She states that this started about 6 weeks ago and had did OTC Monistat with no relief about 6 weeks also.  Asuncion White, LILLIANA ....................  10/10/2019   2:35 PM      Nursing note reviewed with patient.  Accurracy and completeness verified.   Ms. Mccoy is a 64 year old female who:  Patient presents with:  Vaginal Problem      ICD-10-CM    1. Type 2 diabetes mellitus not at goal (H) E11.9 blood glucose ([employer] TEST STRIPS) test strip     B-D ULTRA-FINE 33 LANCETS MISC     Hemoglobin A1c     Lipid Panel     Basic Metabolic Panel     Basic Metabolic Panel     Lipid Panel     Hemoglobin A1c     metFORMIN (GLUCOPHAGE) 500 MG tablet   2. Hypertension, unspecified type I10 metoprolol succinate ER (TOPROL-XL) 25 MG 24 hr tablet     triamterene-HCTZ (DYAZIDE) 37.5-25 MG capsule     Basic Metabolic Panel     Basic Metabolic Panel   3. Need for pneumococcal vaccine Z23 [facility] IMM-  PNEUMOCOCCAL CONJ VACCINE 13 VALENT IM (Prevnar)     DISCONTINUED: pneumococcal (PREVNAR 13) injection 0.5 mL   4. Needs flu shot Z23 [facility] IMM-  FLU VACCINE, INCREASED ANTIGEN, PRESV FREE   5. Vaginal itching N89.8 Wet Prep, Genital     fluconazole (DIFLUCAN) 150 MG tablet   6. Encounter for screening mammogram for breast cancer Z12.31 MA Screen Bilateral w/[facility]   7. Encounter for medication review Z79.899 Hemoglobin A1c     Lipid Panel     Basic Metabolic Panel     Basic Metabolic Panel     Lipid Panel     Hemoglobin A1c   8. Vulvovaginitis N76.0 fluconazole (DIFLUCAN) 150 MG tablet     betamethasone dipropionate (DIPROSONE) 0.05 % external cream     HPI   Presents for medication review and refills--- she is due for screening mammography  Due for A1c--- previously was not able to tolerate metformin, had trialed 500 mg extended release evening meal  Which worked well and then was able to tolerate regular  release metformin has been taking 1 tab, daily    Is not on a statin is uncertain if she would take a statin    Her priority issue today is a vulvar vaginitis with pruritus that she reports is been going on for about a month  She reports this is happened in the remote past when she was attending the [facility] and has been treated with a steroid topical in the past--- she denies any discharge bleeding or pain, there is not been any back pain or fever no dysuria  No dysuria she has tried an over-the-counter yeast cream 3-day and then a 7-day which has not been helpful  Denies any chills or fever associated    She is due and would like pneumonia and seasonal flu vaccine      Review of Systems   Genitourinary:        Pruritis   All other systems reviewed and are negative.     All other systems reviewed and negative.     DIMITRI:   DIMITRI-7 SCORE 8/8/2018   Total Score 0     PHQ9:  No flowsheet data found.    I have personally reviewed the past medical history, past surgical history, medications, allergies, family and social history as listed below, on 10/10/2019.    Allergies   Allergen Reactions     Demeclocycline Nausea     Needs to take with food     Hibiclens      dermatitis     Oxycodone Nausea     Tetracyclines GI Disturbance     Needs to take with food       Current Outpatient Medications   Medication Sig Dispense Refill     B-D ULTRA-FINE 33 LANCETS MISC As directed once daily. DX: E11.9 Diabetes Mellitus Type 2 controlled. 300 each 4     betamethasone dipropionate (DIPROSONE) 0.05 % external cream Apply topically 2 times daily for 14 days 45 g 0     blood glucose ([employer] TEST STRIPS) test strip Dispense item covered by pt ins. E11.65 NIDDM type II, uncontrolled - Test 2 times/day. Reason: High A1C 300 each 4     Blood Glucose Monitoring Suppl (FIFTY50 GLUCOSE METER 2.0) w/Device KIT Dispense meter, test strips, lancets covered by pt ins. E11.65 NIDDM type II, uncontrolled - Test 2 times/day. Reason: High A1C        doxycycline (VIBRAMYCIN) 100 MG capsule Take 1 capsule (100 mg) by mouth daily As needed 90 capsule 3     fluconazole (DIFLUCAN) 150 MG tablet Take 1 tablet (150 mg) by mouth daily for 4 days 4 tablet 0     metFORMIN (GLUCOPHAGE) 500 MG tablet Take 1 tab twice daily before a meal, increase weekly by 1 tab up to 2 tabs before meal daily 120 tablet 2     metoprolol succinate ER (TOPROL-XL) 25 MG 24 hr tablet Take 1 tablet (25 mg) by mouth daily 90 tablet 4     triamterene-HCTZ (DYAZIDE) 37.5-25 MG capsule Take 1 capsule by mouth daily 90 capsule 4        Patient Active Problem List    Diagnosis Date Noted     Prediabetes 09/12/2018     Priority: Medium     Type 2 diabetes mellitus not at goal (H) 08/13/2018     Priority: Medium     Rosacea 08/13/2018     Priority: Medium     Hypertension 08/08/2018     Priority: Medium     Health Care Home 01/11/2017     Priority: Medium     Last Assessment & Plan:   Formatting of this note may be different from the original.  If you have any questions or concerns please contact:  Your Care Team at [facility] :[phone]  Primary Care Provider: Yanira Celis MD  RN Coordinator: RACHEL House  CareLine at [phone] or [phone] after hours and on weekends.     Care Coordination  (Community Resources, Specialists, Home Health Agency, Caregiver, Supply Vendor, )     Contact Name PAUL Obtained? Phone Number Role in Care Comments    Lacy RAMOS                Action Plan    Margie's Chosen Goal:  Diabetic control    Detailed Action Steps to Achieve Goal & Who Will Complete:  Pt will see a dietician, and have diabetic education. The pt will establish trusting relationship with RN.    Confidence Level:  On a scale of 1-10, Margie's confidence level to achieve this goal is 7    Date Goal Achieved     Additional Patient Instructions    1. Pt will start metformin 500 mg BID  2. Pt will see dietician  3. Pt will see diabetic nurse educator  4. Pt will call nurse  care coordinator with questions related to DM    Follow-Up    Lacy will follow-up with Magrie by phone on 1/11/17.       Past Medical History:   Diagnosis Date     Rosacea 8/13/2018     Past Surgical History:   Procedure Laterality Date     COLONOSCOPY  2007    neg     FUSION CERVICAL ANTERIOR TWO LEVELS  2014    [facility]--Dr Gray     RELEASE TRIGGER FINGER Right 9/21/2018    Procedure: RELEASE TRIGGER FINGER;  Right Thumb Trigger Release;  Surgeon: Wagner Newton MD;  Location: [facility] OR     TUBAL LIGATION      22 years old     Social History     Socioeconomic History     Marital status:      Spouse name: None     Number of children: None     Years of education: None     Highest education level: None   Occupational History     None   Social Needs     Financial resource strain: None     Food insecurity:     Worry: None     Inability: None     Transportation needs:     Medical: None     Non-medical: None   Tobacco Use     Smoking status: Never Smoker     Smokeless tobacco: Never Used   Substance and Sexual Activity     Alcohol use: Yes     Comment: 2 drinks per month      Drug use: No     Sexual activity: Yes     Partners: Male     Birth control/protection: None   Lifestyle     Physical activity:     Days per week: None     Minutes per session: None     Stress: None   Relationships     Social connections:     Talks on phone: None     Gets together: None     Attends Tenri[employer] service: None     Active member of club or organization: None     Attends meetings of clubs or organizations: None     Relationship status: None     Intimate partner violence:     Fear of current or ex partner: None     Emotionally abused: None     Physically abused: None     Forced sexual activity: None   Other Topics Concern     Parent/sibling w/ CABG, MI or angioplasty before 65F 55M? Not Asked   Social History Narrative    p 10/3/2013.     No family history on file.    EXAM:   Vitals:    10/10/19 1440   BP: 132/86   Pulse: 60   Resp:  "12   Temp: 97  F (36.1  C)   Weight: 78 kg (172 lb)   Height: 1.613 m (5' 3.5\")       Current Pain Score: Data Unavailable     BP Readings from Last 3 Encounters:   10/10/19 132/86   09/21/18 134/68   09/12/18 122/80      Wt Readings from Last 3 Encounters:   10/10/19 78 kg (172 lb)   09/21/18 79.4 kg (175 lb)   09/12/18 79.6 kg (175 lb 6.4 oz)      Estimated body mass index is 29.99 kg/m  as calculated from the following:    Height as of this encounter: 1.613 m (5' 3.5\").    Weight as of this encounter: 78 kg (172 lb).     Physical Exam  Vitals signs and nursing note reviewed.   Constitutional:       Appearance: Normal appearance.   Cardiovascular:      Rate and Rhythm: Normal rate.   Pulmonary:      Effort: Pulmonary effort is normal.   Genitourinary:     Comments: Pelvic exam mild erythema outer and inner labia no focal lesions,introitus erythema no discharge, or bleeding  No visible raised or papular lesions  Wet prep obtained  Musculoskeletal: Normal range of motion.   Skin:     General: Skin is warm and dry.   Neurological:      Mental Status: She is alert and oriented to person, place, and time.   Psychiatric:         Mood and Affect: Mood normal.         Behavior: Behavior normal.         Thought Content: Thought content normal.         Judgement: Judgment normal.         INVESTIGATIONS:  Results for orders placed or performed in visit on 10/10/19   Basic Metabolic Panel   Result Value Ref Range    Sodium 139 134 - 144 mmol/L    Potassium 3.4 (L) 3.5 - 5.1 mmol/L    Chloride 99 98 - 107 mmol/L    Carbon Dioxide 30 21 - 31 mmol/L    Anion Gap 10 3 - 14 mmol/L    Glucose 137 (H) 70 - 105 mg/dL    Urea Nitrogen 14 7 - 25 mg/dL    Creatinine 0.85 0.60 - 1.20 mg/dL    GFR Estimate 67 >60 mL/min/[1.73_m2]    GFR Estimate If Black 81 >60 mL/min/[1.73_m2]    Calcium 9.5 8.6 - 10.3 mg/dL   Lipid Panel   Result Value Ref Range    Cholesterol 196 <200 mg/dL    Triglycerides 129 <150 mg/dL    HDL Cholesterol 49 23 - 92 " mg/dL    LDL Cholesterol Calculated 121 (H) <100 mg/dL    Non HDL Cholesterol 147 (H) <130 mg/dL   Hemoglobin A1c   Result Value Ref Range    Hemoglobin A1C 7.7 (H) 4.0 - 6.0 %   Wet Prep, Genital   Result Value Ref Range    Specimen Description Vagina     Wet Prep No yeast seen     Wet Prep No Trichomonas seen     Wet Prep No clue cells seen        ASSESSMENT AND PLAN:  Problem List Items Addressed This Visit        Endocrine    Type 2 diabetes mellitus not at goal (H) - Primary    Relevant Medications    blood glucose ([employer] TEST STRIPS) test strip    B-D ULTRA-FINE 33 LANCETS MISC    metFORMIN (GLUCOPHAGE) 500 MG tablet    Other Relevant Orders    Hemoglobin A1c (Completed)    Lipid Panel (Completed)    Basic Metabolic Panel (Completed)       Circulatory    Hypertension    Relevant Medications    metoprolol succinate ER (TOPROL-XL) 25 MG 24 hr tablet    triamterene-HCTZ (DYAZIDE) 37.5-25 MG capsule    Other Relevant Orders    Basic Metabolic Panel (Completed)      Other Visit Diagnoses     Need for pneumococcal vaccine        Relevant Orders    [facility] IMM-  PNEUMOCOCCAL CONJ VACCINE 13 VALENT IM (Prevnar) (Completed)    Needs flu shot        Relevant Orders    [facility] IMM-  FLU VACCINE, INCREASED ANTIGEN, PRESV FREE (Completed)    Vaginal itching        Relevant Medications    fluconazole (DIFLUCAN) 150 MG tablet    betamethasone dipropionate (DIPROSONE) 0.05 % external cream    Other Relevant Orders    Wet Prep, Genital (Completed)    Encounter for screening mammogram for breast cancer        Relevant Orders    MA Screen Bilateral w/[facility]    Encounter for medication review        Relevant Orders    Hemoglobin A1c (Completed)    Lipid Panel (Completed)    Basic Metabolic Panel (Completed)    Vulvovaginitis        Relevant Medications    fluconazole (DIFLUCAN) 150 MG tablet    betamethasone dipropionate (DIPROSONE) 0.05 % external cream        Vulvar dermatitis wet prep negative--- however not as sensitive for  yeast  Differentials include lichen planus atrophicus    Will treat with Diflucan x3 doses and  corticosteroid topical to affected vulvar area along with vulvar hygiene and avoidance of any contact irritants discussed expected course--- should be resolved or almost resolved in 2 weeks  She will return to clinic if not resolving--- I had discussed my recommendations to refer to gynecology if no improvement or response  May need vulvar punch biopsy for definitive diagnosis if not responsive to above therapy    Will adjust up metformin weekly to maximum dose and should have A1c recheck in 3 months    Mammography screening scheduled    Seasonal flu vaccine and pneumococcal vaccines updated      -- Expected clinical course discussed    -- Medications and their side effects discussed    There are no Patient Instructions on file for this visit.  Sravani Carrillo CNP  [facility]     Portions of this note were dictated using speech recognition software. The note has been proofread but errors in the text may have been overlooked. Please contact me if there are any concerns regarding the accuracy of the dictation.

## 2019-11-07 ENCOUNTER — HOSPITAL ENCOUNTER (OUTPATIENT)
Dept: MAMMOGRAPHY | Facility: OTHER | Age: 64
Discharge: HOME | End: 2019-11-07
Attending: NURSE PRACTITIONER | Admitting: NURSE PRACTITIONER
Payer: COMMERCIAL

## 2019-11-07 DIAGNOSIS — Z12.31 ENCOUNTER FOR SCREENING MAMMOGRAM FOR BREAST CANCER: ICD-10-CM

## 2019-11-07 PROCEDURE — 77063 BREAST TOMOSYNTHESIS BI: CPT

## 2020-01-01 PROBLEM — R73.03 PREDIABETES: Status: RESOLVED | Noted: 2018-09-12 | Resolved: 2020-01-01

## 2020-01-01 PROBLEM — E11.9 TYPE 2 DIABETES MELLITUS NOT AT GOAL (H): Status: RESOLVED | Noted: 2018-08-13 | Resolved: 2020-01-01

## 2020-01-01 PROBLEM — E78.2 MIXED HYPERLIPIDEMIA: Status: ACTIVE | Noted: 2020-01-01

## 2020-01-01 PROBLEM — I10 BENIGN ESSENTIAL HYPERTENSION: Status: ACTIVE | Noted: 2020-01-01

## 2020-01-01 PROBLEM — E11.9 CONTROLLED TYPE 2 DIABETES MELLITUS WITHOUT COMPLICATION, WITHOUT LONG-TERM CURRENT USE OF INSULIN (H): Status: ACTIVE | Noted: 2018-08-08

## 2020-01-01 PROBLEM — Z76.89 HEALTH CARE HOME: Status: ACTIVE | Noted: 2017-01-11

## 2020-01-01 PROBLEM — I10 HYPERTENSION: Status: RESOLVED | Noted: 2018-08-08 | Resolved: 2020-01-01

## 2020-01-02 ENCOUNTER — OFFICE VISIT (OUTPATIENT)
Dept: INTERNAL MEDICINE | Facility: OTHER | Age: 65
End: 2020-01-02
Attending: INTERNAL MEDICINE
Payer: COMMERCIAL

## 2020-01-02 VITALS
WEIGHT: 160.5 LBS | HEART RATE: 64 BPM | RESPIRATION RATE: 16 BRPM | DIASTOLIC BLOOD PRESSURE: 68 MMHG | SYSTOLIC BLOOD PRESSURE: 124 MMHG | TEMPERATURE: 96.9 F | BODY MASS INDEX: 27.99 KG/M2

## 2020-01-02 DIAGNOSIS — E78.2 MIXED HYPERLIPIDEMIA: ICD-10-CM

## 2020-01-02 DIAGNOSIS — E11.9 CONTROLLED TYPE 2 DIABETES MELLITUS WITHOUT COMPLICATION, WITHOUT LONG-TERM CURRENT USE OF INSULIN (H): ICD-10-CM

## 2020-01-02 DIAGNOSIS — Z78.0 ASYMPTOMATIC MENOPAUSE: ICD-10-CM

## 2020-01-02 DIAGNOSIS — I10 BENIGN ESSENTIAL HYPERTENSION: Primary | ICD-10-CM

## 2020-01-02 LAB
ALBUMIN SERPL-MCNC: 4.6 G/DL (ref 3.5–5.7)
ALBUMIN UR-MCNC: NEGATIVE MG/DL
ALP SERPL-CCNC: 53 U/L (ref 34–104)
ALT SERPL W P-5'-P-CCNC: 28 U/L (ref 7–52)
ANION GAP SERPL CALCULATED.3IONS-SCNC: 10 MMOL/L (ref 3–14)
APPEARANCE UR: CLEAR
AST SERPL W P-5'-P-CCNC: 28 U/L (ref 13–39)
BILIRUB SERPL-MCNC: 1.1 MG/DL (ref 0.3–1)
BILIRUB UR QL STRIP: NEGATIVE
BUN SERPL-MCNC: 21 MG/DL (ref 7–25)
CALCIUM SERPL-MCNC: 9.5 MG/DL (ref 8.6–10.3)
CHLORIDE SERPL-SCNC: 98 MMOL/L (ref 98–107)
CHOLEST SERPL-MCNC: 186 MG/DL
CO2 SERPL-SCNC: 30 MMOL/L (ref 21–31)
COLOR UR AUTO: YELLOW
CREAT SERPL-MCNC: 0.87 MG/DL (ref 0.6–1.2)
CREAT UR-MCNC: 41 MG/DL
ERYTHROCYTE [DISTWIDTH] IN BLOOD BY AUTOMATED COUNT: 13.2 % (ref 10–15)
GFR SERPL CREATININE-BSD FRML MDRD: 65 ML/MIN/{1.73_M2}
GLUCOSE SERPL-MCNC: 123 MG/DL (ref 70–105)
GLUCOSE UR STRIP-MCNC: NEGATIVE MG/DL
HBA1C MFR BLD: 6.5 % (ref 4–6)
HCT VFR BLD AUTO: 44.3 % (ref 35–47)
HDLC SERPL-MCNC: 49 MG/DL (ref 23–92)
HGB BLD-MCNC: 15.1 G/DL (ref 11.7–15.7)
HGB UR QL STRIP: NEGATIVE
KETONES UR STRIP-MCNC: ABNORMAL MG/DL
LDLC SERPL CALC-MCNC: 112 MG/DL
LEUKOCYTE ESTERASE UR QL STRIP: ABNORMAL
MCH RBC QN AUTO: 30.3 PG (ref 26.5–33)
MCHC RBC AUTO-ENTMCNC: 34.1 G/DL (ref 31.5–36.5)
MCV RBC AUTO: 89 FL (ref 78–100)
MICROALBUMIN UR-MCNC: <5 MG/L
MICROALBUMIN/CREAT UR: NORMAL MG/G CR (ref 0–25)
NITRATE UR QL: NEGATIVE
NON-SQ EPI CELLS #/AREA URNS LPF: NORMAL /LPF
NONHDLC SERPL-MCNC: 137 MG/DL
PH UR STRIP: 5 PH (ref 5–9)
PLATELET # BLD AUTO: 220 10E9/L (ref 150–450)
POTASSIUM SERPL-SCNC: 3.3 MMOL/L (ref 3.5–5.1)
PROT SERPL-MCNC: 8.4 G/DL (ref 6.4–8.9)
RBC # BLD AUTO: 4.98 10E12/L (ref 3.8–5.2)
RBC #/AREA URNS AUTO: NORMAL /HPF
SODIUM SERPL-SCNC: 138 MMOL/L (ref 134–144)
SOURCE: ABNORMAL
SP GR UR STRIP: 1.02 (ref 1–1.03)
TRIGL SERPL-MCNC: 127 MG/DL
TSH SERPL DL<=0.05 MIU/L-ACNC: 1.06 IU/ML (ref 0.34–5.6)
UROBILINOGEN UR STRIP-ACNC: 0.2 EU/DL (ref 0.2–1)
WBC # BLD AUTO: 5.4 10E9/L (ref 4–11)
WBC #/AREA URNS AUTO: NORMAL /HPF

## 2020-01-02 PROCEDURE — 36415 COLL VENOUS BLD VENIPUNCTURE: CPT | Mod: ZL | Performed by: INTERNAL MEDICINE

## 2020-01-02 PROCEDURE — 85027 COMPLETE CBC AUTOMATED: CPT | Mod: ZL | Performed by: INTERNAL MEDICINE

## 2020-01-02 PROCEDURE — G0463 HOSPITAL OUTPT CLINIC VISIT: HCPCS | Performed by: INTERNAL MEDICINE

## 2020-01-02 PROCEDURE — 82043 UR ALBUMIN QUANTITATIVE: CPT | Mod: ZL | Performed by: INTERNAL MEDICINE

## 2020-01-02 PROCEDURE — 80061 LIPID PANEL: CPT | Mod: ZL | Performed by: INTERNAL MEDICINE

## 2020-01-02 PROCEDURE — 99214 OFFICE O/P EST MOD 30 MIN: CPT | Performed by: INTERNAL MEDICINE

## 2020-01-02 PROCEDURE — 84443 ASSAY THYROID STIM HORMONE: CPT | Mod: ZL | Performed by: INTERNAL MEDICINE

## 2020-01-02 PROCEDURE — 83036 HEMOGLOBIN GLYCOSYLATED A1C: CPT | Mod: ZL | Performed by: INTERNAL MEDICINE

## 2020-01-02 PROCEDURE — 81001 URINALYSIS AUTO W/SCOPE: CPT | Mod: ZL | Performed by: INTERNAL MEDICINE

## 2020-01-02 PROCEDURE — 80053 COMPREHEN METABOLIC PANEL: CPT | Mod: ZL | Performed by: INTERNAL MEDICINE

## 2020-01-02 RX ORDER — METFORMIN HCL 500 MG
2000 TABLET, EXTENDED RELEASE 24 HR ORAL
Qty: 360 TABLET | Refills: 3 | Status: SHIPPED | OUTPATIENT
Start: 2020-01-02 | End: 2021-07-22

## 2020-01-02 ASSESSMENT — ENCOUNTER SYMPTOMS
DYSURIA: 0
VOMITING: 0
MYALGIAS: 0
CONFUSION: 0
ABDOMINAL PAIN: 0
WHEEZING: 0
ARTHRALGIAS: 1
HEMATURIA: 0
LIGHT-HEADEDNESS: 0
CHILLS: 0
AGITATION: 0
WOUND: 0
SHORTNESS OF BREATH: 0
FEVER: 0
NAUSEA: 0
DIZZINESS: 0
BRUISES/BLEEDS EASILY: 0
DIARRHEA: 0
PALPITATIONS: 1
COUGH: 0

## 2020-01-02 ASSESSMENT — PATIENT HEALTH QUESTIONNAIRE - PHQ9
5. POOR APPETITE OR OVEREATING: NOT AT ALL
SUM OF ALL RESPONSES TO PHQ QUESTIONS 1-9: 0

## 2020-01-02 ASSESSMENT — ANXIETY QUESTIONNAIRES
7. FEELING AFRAID AS IF SOMETHING AWFUL MIGHT HAPPEN: NOT AT ALL
6. BECOMING EASILY ANNOYED OR IRRITABLE: NOT AT ALL
GAD7 TOTAL SCORE: 0
2. NOT BEING ABLE TO STOP OR CONTROL WORRYING: NOT AT ALL
5. BEING SO RESTLESS THAT IT IS HARD TO SIT STILL: NOT AT ALL
1. FEELING NERVOUS, ANXIOUS, OR ON EDGE: NOT AT ALL
IF YOU CHECKED OFF ANY PROBLEMS ON THIS QUESTIONNAIRE, HOW DIFFICULT HAVE THESE PROBLEMS MADE IT FOR YOU TO DO YOUR WORK, TAKE CARE OF THINGS AT HOME, OR GET ALONG WITH OTHER PEOPLE: NOT DIFFICULT AT ALL
3. WORRYING TOO MUCH ABOUT DIFFERENT THINGS: NOT AT ALL

## 2020-01-02 ASSESSMENT — PAIN SCALES - GENERAL: PAINLEVEL: NO PAIN (0)

## 2020-01-02 NOTE — NURSING NOTE
"Patient presents to the clinic for establish care.      Previous A1C is at goal of <8  Lab Results   Component Value Date    A1C 7.7 10/10/2019    A1C 7.7 08/08/2018     Urine microalbumin:creatine: n/a  Foot exam monitors at home-declines today  Eye exam yearly    Tobacco User no  Patient is not on a daily aspirin  Patient is not on a Statin.  Blood pressure today of:     BP Readings from Last 1 Encounters:   10/10/19 132/86      is at the goal of <139/89 for diabetics.      Chief Complaint   Patient presents with     Establish Care       Initial /68 (BP Location: Right arm, Patient Position: Sitting, Cuff Size: Adult Regular)   Pulse 64   Temp 96.9  F (36.1  C) (Tympanic)   Resp 16   Wt 72.8 kg (160 lb 8 oz)   BMI 27.99 kg/m   Estimated body mass index is 27.99 kg/m  as calculated from the following:    Height as of 10/10/19: 1.613 m (5' 3.5\").    Weight as of this encounter: 72.8 kg (160 lb 8 oz).  Medication Reconciliation: complete    Eulalia Fong LPN    "

## 2020-01-02 NOTE — PROGRESS NOTES
"Nursing Notes:   Eulalia Fong LPN  1/2/2020 10:32 AM  Signed  Patient presents to the clinic for establish care.      Previous A1C is at goal of <8  Lab Results   Component Value Date    A1C 7.7 10/10/2019    A1C 7.7 08/08/2018     Urine microalbumin:creatine: n/a  Foot exam monitors at home-declines today  Eye exam yearly    Tobacco User no  Patient is not on a daily aspirin  Patient is not on a Statin.  Blood pressure today of:     BP Readings from Last 1 Encounters:   10/10/19 132/86      is at the goal of <139/89 for diabetics.      Chief Complaint   Patient presents with     Establish Care       Initial /68 (BP Location: Right arm, Patient Position: Sitting, Cuff Size: Adult Regular)   Pulse 64   Temp 96.9  F (36.1  C) (Tympanic)   Resp 16   Wt 72.8 kg (160 lb 8 oz)   BMI 27.99 kg/m    Estimated body mass index is 27.99 kg/m  as calculated from the following:    Height as of 10/10/19: 1.613 m (5' 3.5\").    Weight as of this encounter: 72.8 kg (160 lb 8 oz).  Medication Reconciliation: complete    Eulalia Fong LPN    Nursing note reviewed with patient.  Accuracy and completeness verified.   Ms. Mccoy is a 64 year old female who:  Patient presents with:  Establish Care      ICD-10-CM    1. Benign essential hypertension I10    2. Mixed hyperlipidemia E78.2 Lipid Profile     Lipid Profile   3. Controlled type 2 diabetes mellitus without complication, without long-term current use of insulin (H) E11.9 Thyrotropin GH     Albumin Random Urine Quantitative with Creat Ratio     CBC with platelets     Comprehensive metabolic panel     *UA reflex to Microscopic     Hemoglobin A1c     metFORMIN (GLUCOPHAGE-XR) 500 MG 24 hr tablet     Hemoglobin A1c     Comprehensive metabolic panel     CBC with platelets     Thyrotropin GH   4. Asymptomatic menopause Z78.0 DX Hip/Pelvis/Spine     HPI  Hypertension, currently well controlled.  Tolerating medication regimen.  Due for lab work today.  States that she does " get low pulse intermittently from the metoprolol.    Mixed hyperlipidemia, currently diet controlled.  Never started Crestor.  Coronary risk calculator estimates 15% risk in the next 10 years.  She would like to check labs today.  She did lose some weight and has been trying to watch her diet more closely.    Type 2 diabetes, currently managing with metformin.  Not able to take 3 tablets daily due to diarrhea.  Switch over to extended release Metformin today.  Due for lab work.  --Start 2000 mg daily of extended release Metformin.  Change to extended release metformin... currently 500 mg BID. Gets loose stools with TID dosing.     Asymptomatic menopause, DEXA scan due.  Orders placed.    Colon cancer screening, Cologuard ordered.    Functional Capacity: > 4 METS.   Review of Systems   Constitutional: Negative for chills and fever.   HENT: Negative for congestion and hearing loss.    Eyes: Negative for visual disturbance.   Respiratory: Negative for cough, shortness of breath and wheezing.    Cardiovascular: Positive for palpitations (  - now rare with metoprolol). Negative for chest pain.   Gastrointestinal: Negative for abdominal pain, diarrhea, nausea and vomiting.   Endocrine: Negative for cold intolerance and heat intolerance.   Genitourinary: Negative for dysuria and hematuria.   Musculoskeletal: Positive for arthralgias (fell July 2019, hurt right shoulder, has ROM limitation). Negative for myalgias.   Skin: Negative for rash and wound.   Allergic/Immunologic: Negative for immunocompromised state.   Neurological: Negative for dizziness and light-headedness.   Hematological: Does not bruise/bleed easily.   Psychiatric/Behavioral: Negative for agitation and confusion.        Problem List/PMH: reviewed in EMR, and made relevant updates today.  Medications: reviewed in EMR, and made relevant updates today.  Allergies: reviewed in EMR, and made relevant updates today.  I reviewed family and social history and made  "relevant updates today.  Social History     Tobacco Use     Smoking status: Never Smoker     Smokeless tobacco: Never Used   Substance Use Topics     Alcohol use: Yes     Comment: 2 drinks per month      Drug use: No      History reviewed. No pertinent family history.    EXAM:   Vitals:    01/02/20 1020   BP: 124/68   BP Location: Right arm   Patient Position: Sitting   Cuff Size: Adult Regular   Pulse: 64   Resp: 16   Temp: 96.9  F (36.1  C)   TempSrc: Tympanic   Weight: 72.8 kg (160 lb 8 oz)       Current Pain Score: No Pain (0)     BP Readings from Last 3 Encounters:   01/02/20 124/68   10/10/19 132/86   09/21/18 134/68      Wt Readings from Last 3 Encounters:   01/02/20 72.8 kg (160 lb 8 oz)   10/10/19 78 kg (172 lb)   09/21/18 79.4 kg (175 lb)      Estimated body mass index is 27.99 kg/m  as calculated from the following:    Height as of 10/10/19: 1.613 m (5' 3.5\").    Weight as of this encounter: 72.8 kg (160 lb 8 oz).     Physical Exam  Constitutional:       General: She is not in acute distress.     Appearance: She is well-developed. She is not diaphoretic.   HENT:      Head: Normocephalic and atraumatic.   Eyes:      General: No scleral icterus.     Conjunctiva/sclera: Conjunctivae normal.   Neck:      Musculoskeletal: Neck supple.   Cardiovascular:      Rate and Rhythm: Normal rate and regular rhythm.   Pulmonary:      Effort: Pulmonary effort is normal.      Breath sounds: Normal breath sounds.   Abdominal:      Palpations: Abdomen is soft.      Tenderness: There is no abdominal tenderness.   Musculoskeletal:         General: Tenderness (  right shoulder ROM limitation - injured in July 2019  ) present. No deformity.   Lymphadenopathy:      Cervical: No cervical adenopathy.   Skin:     General: Skin is warm and dry.      Findings: No rash.   Neurological:      General: No focal deficit present.      Mental Status: She is alert.   Psychiatric:         Mood and Affect: Mood normal.         Behavior: " Behavior normal.          Procedures   INVESTIGATIONS:  No results found for any visits on 01/02/20.    ASSESSMENT AND PLAN:  Problem List Items Addressed This Visit        Endocrine    Controlled type 2 diabetes mellitus without complication, without long-term current use of insulin (H)    Relevant Medications    metFORMIN (GLUCOPHAGE-XR) 500 MG 24 hr tablet    Other Relevant Orders    Thyrotropin GH    Albumin Random Urine Quantitative with Creat Ratio    CBC with platelets    Comprehensive metabolic panel    *UA reflex to Microscopic    Hemoglobin A1c    Mixed hyperlipidemia    Relevant Orders    Lipid Profile       Circulatory    Benign essential hypertension - Primary      Other Visit Diagnoses     Asymptomatic menopause        Relevant Orders    DX Hip/Pelvis/Spine        reviewed diet, exercise and weight control, recommended sodium restriction, cardiovascular risk and specific lipid/LDL goals reviewed, specific diabetic recommendations low cholesterol diet, weight control and daily exercise discussed, use of aspirin to prevent MI and TIA's discussed  -- Expected clinical course discussed    -- Medications and their side effects discussed    Patient Instructions     DEXA scan ordered  - they will call with date/time of appointment.      Change over to the cheap, generic extended release Metformin.... can help with reducing diarrhea.   -- start with 2 tablets with supper... slowly work up to 4 tablets daily.   -- can take them twice daily if desired.     Labs today.     + Cologuard stool blood testing ordered -- mail in sample once you get the kit in the mail.      -- consider starting Crestor.... for heart attack risk reduction.       The 10-year ASCVD risk score (Remsenburgangel luis DEVI Jr., et al., 2013) is: 15%    Values used to calculate the score:      Age: 64 years      Sex: Female      Is Non- : No      Diabetic: Yes      Tobacco smoker: No      Systolic Blood Pressure: 124 mmHg      Is BP  "treated: Yes      HDL Cholesterol: 49 mg/dL      Total Cholesterol: 196 mg/dL      Your Body mass index (BMI) is:  Estimated body mass index is 27.99 kg/m  as calculated from the following:    Height as of 10/10/19: 1.613 m (5' 3.5\").    Weight as of this encounter: 72.8 kg (160 lb 8 oz).   (BMI ranges: Normal 18.5 - 25, Overweight 25 - 30, Obesity greater than 30)     Facts about losing weight:   -- Overweight and Obesity increase your risk for developing diabetes, high blood pressure and stroke, and shorten your life.   -- 90% of weight loss comes from dietary changes, only 10% from exercise   -- For every 1 pound of of weight loss -- this is equal to about 8 to 10 pounds of weight off of your knees.   -- there are about 3500 calories in 1 pound of body weight.     -- Goal Caloric intake -- 1200 to 1500 calories daily.     Get out and exercise, bike ride, walk for 10 to 15 minutes after each meal -- this can significantly lowers the spike in blood sugar after eating.     Keep working to try obtain/maintain healthy weight, weight loss, healthy diet and regular exercise.    To help with weight loss and improve blood sugar control....    -- Try to avoid Carbohydrates as much as possible -- breads, pasta, baked goods, cakes, oatmeal, cold cereal, potatoes.   These are turned to sugar in one metabolic conversion, cause insulin secretion and increased fat deposition / weight gain.      -- Eat more lean meats, proteins, eggs, nuts, vegetables.      What have successful people done to lose large amounts of weight, and keep it off?   -- Used both diet and exercise to lose weight   -- Ate a healthy breakfast every day   -- Exercised an hour per day   -- Watched less than 10 hours of TV per week   -- Weighed themselves weekly   -- Drink a large glass of water 10-15 minutes before your meals.   -- Use smaller dinner plates and don't go back for seconds.     What should I do?   -- Work on 5-10% weight loss   -- Focus on a few " healthy dietary changes   -- Eat more fresh fruits and vegetables, and fewer carbohydrates (http://myplate.gov/)   -- Exercise by some means -- every day   -- Weigh yourself once a week    Weight Management   Weight Watchers     Meets Mondays 9:30, 11:45, or 5:30    [facility], [address], [place], [place]     Contact Angeline [phone] [email or web address]  Weight Watchers [email or web address]    -- Consider My Fitness Pal on your smart phone  [email or web address]       Return for Diabetes labs and clinic follow-up appointment every 3 to 4 months.  --- (Go for about 91 to 100 days)    Aspects of Diabetes we can improve:  Hemoglobin A1c Lab Results   Component Value Date    A1C 7.7 10/10/2019    A1C 7.7 08/08/2018    Goal range is under 8. Best is 6.5 to 7   Blood Pressure 124/68 Goal to keep less than 140/90   Tobacco  reports that she has never smoked. She has never used smokeless tobacco. Goal to abstain from tobacco   Aspirin or Plavix Consider starting low-dose Aspirin 81 mg daily.  Aspirin or Plavix reduces risk of heart disease and stroke   ACE/ARB Consider starting one of these.  These medications reduce risk of kidney disease   Cholesterol Consider starting Crestor.  Statins reduce risk of heart disease and stroke   Eye Exam -- Do Yearly -- Annual diabetic eye exam   Healthy weight Body mass index is 27.99 kg/m . Goal BMI under 30, best is under 25.      -- Trying to exercise daily (goal at least 20 min/day) with moderate aerobic activity   -- Eat healthy (resources from ADA at http://www.diabetes.org/)   -- Taking good care of my feet. Consider seeing the Podiatrist   -- Check blood sugars as directed, record in log book and bring to every appointment      Schedule lab only appointment --- A few days AFTER: 4/1/20   Schedule clinic appointment with Dr. Bentley -- Same day as labs, or 1-2 days later.     Insurance companies are now grading you and I on your blood sugar control -- Goal is to get  your A1c down to 7.9% or lower and NO Smoking!    -- Medicare and most insurance companies, will only cover Hemoglobin A1c labs to be rechecked every 91+ days.      Hemoglobin A1C   Date Value Ref Range Status   10/10/2019 7.7 (H) 4.0 - 6.0 % Final   08/08/2018 7.7 (H) 4.0 - 6.0 % Final       Next follow-up appointment with Dr. Bentley should be scheduled:  -- Approximately a few days AFTER: 4/1/20      Alexander Bentley MD   Internal Medicine  [facility]     Portions of this note were dictated using speech recognition software. The note has been proofread but errors in the text may have been overlooked. Please contact me if there are any concerns regarding the accuracy of the dictation.

## 2020-01-02 NOTE — PATIENT INSTRUCTIONS
"DEXA scan ordered  - they will call with date/time of appointment.      Change over to the cheap, generic extended release Metformin.... can help with reducing diarrhea.   -- start with 2 tablets with supper... slowly work up to 4 tablets daily.   -- can take them twice daily if desired.     Labs today.     + Cologuard stool blood testing ordered -- mail in sample once you get the kit in the mail.      -- consider starting Crestor.... for heart attack risk reduction.       The 10-year ASCVD risk score (Ritesh DEVI Jr., et al., 2013) is: 15%    Values used to calculate the score:      Age: 64 years      Sex: Female      Is Non- : No      Diabetic: Yes      Tobacco smoker: No      Systolic Blood Pressure: 124 mmHg      Is BP treated: Yes      HDL Cholesterol: 49 mg/dL      Total Cholesterol: 196 mg/dL      Your Body mass index (BMI) is:  Estimated body mass index is 27.99 kg/m  as calculated from the following:    Height as of 10/10/19: 1.613 m (5' 3.5\").    Weight as of this encounter: 72.8 kg (160 lb 8 oz).   (BMI ranges: Normal 18.5 - 25, Overweight 25 - 30, Obesity greater than 30)     Facts about losing weight:   -- Overweight and Obesity increase your risk for developing diabetes, high blood pressure and stroke, and shorten your life.   -- 90% of weight loss comes from dietary changes, only 10% from exercise   -- For every 1 pound of of weight loss -- this is equal to about 8 to 10 pounds of weight off of your knees.   -- there are about 3500 calories in 1 pound of body weight.     -- Goal Caloric intake -- 1200 to 1500 calories daily.     Get out and exercise, bike ride, walk for 10 to 15 minutes after each meal -- this can significantly lowers the spike in blood sugar after eating.     Keep working to try obtain/maintain healthy weight, weight loss, healthy diet and regular exercise.    To help with weight loss and improve blood sugar control....    -- Try to avoid Carbohydrates as much as " possible -- breads, pasta, baked goods, cakes, oatmeal, cold cereal, potatoes.   These are turned to sugar in one metabolic conversion, cause insulin secretion and increased fat deposition / weight gain.      -- Eat more lean meats, proteins, eggs, nuts, vegetables.      What have successful people done to lose large amounts of weight, and keep it off?   -- Used both diet and exercise to lose weight   -- Ate a healthy breakfast every day   -- Exercised an hour per day   -- Watched less than 10 hours of TV per week   -- Weighed themselves weekly   -- Drink a large glass of water 10-15 minutes before your meals.   -- Use smaller dinner plates and don't go back for seconds.     What should I do?   -- Work on 5-10% weight loss   -- Focus on a few healthy dietary changes   -- Eat more fresh fruits and vegetables, and fewer carbohydrates (http://myplate.gov/)   -- Exercise by some means -- every day   -- Weigh yourself once a week    Weight Management   Weight Watchers     Meets Mondays 9:30, 11:45, or 5:30    [facility], [address], [place], [place]     Contact Angeline [phone] [email or web address]  Weight Watchers [email or web address]    -- Consider My Fitness Pal on your smart phone  [email or web address]       Return for Diabetes labs and clinic follow-up appointment every 3 to 4 months.  --- (Go for about 91 to 100 days)    Aspects of Diabetes we can improve:  Hemoglobin A1c Lab Results   Component Value Date    A1C 7.7 10/10/2019    A1C 7.7 08/08/2018    Goal range is under 8. Best is 6.5 to 7   Blood Pressure 124/68 Goal to keep less than 140/90   Tobacco  reports that she has never smoked. She has never used smokeless tobacco. Goal to abstain from tobacco   Aspirin or Plavix Consider starting low-dose Aspirin 81 mg daily.  Aspirin or Plavix reduces risk of heart disease and stroke   ACE/ARB Consider starting one of these.  These medications reduce risk of kidney disease   Cholesterol Consider  starting Crestor.  Statins reduce risk of heart disease and stroke   Eye Exam -- Do Yearly -- Annual diabetic eye exam   Healthy weight Body mass index is 27.99 kg/m . Goal BMI under 30, best is under 25.      -- Trying to exercise daily (goal at least 20 min/day) with moderate aerobic activity   -- Eat healthy (resources from ADA at http://www.diabetes.org/)   -- Taking good care of my feet. Consider seeing the Podiatrist   -- Check blood sugars as directed, record in log book and bring to every appointment      Schedule lab only appointment --- A few days AFTER: 4/1/20   Schedule clinic appointment with Dr. Bentley -- Same day as labs, or 1-2 days later.     Insurance companies are now grading you and I on your blood sugar control -- Goal is to get your A1c down to 7.9% or lower and NO Smoking!    -- Medicare and most insurance companies, will only cover Hemoglobin A1c labs to be rechecked every 91+ days.      Hemoglobin A1C   Date Value Ref Range Status   10/10/2019 7.7 (H) 4.0 - 6.0 % Final   08/08/2018 7.7 (H) 4.0 - 6.0 % Final       Next follow-up appointment with Dr. Bentley should be scheduled:  -- Approximately a few days AFTER: 4/1/20

## 2020-01-02 NOTE — LETTER
January 4, 2020      Margie Mccoy  [id number]  [facility] [id number]        Dear ,    We are writing to inform you of your test results.    Overall, labs look pretty good.    Diabetes is well controlled.     -- Pre-diabetes:    fasting glucose: 100 - 125    hemoglobin A1c: 5.7 - 6.4   -- Diabetes:    fasting glucose greater than 125    random glucose greater than 200    hemoglobin A1c: > or = 6.5    LDL cholesterol level is minimally elevated.    Potassium is slightly low.    Thyroid level is normal.    To help with weight loss and improve blood sugar control....    -- Try to avoid Carbohydrates as much as possible -- breads, pasta, baked goods, cakes, oatmeal, cold cereal, potatoes.   These are turned to sugar in one metabolic conversion, cause insulin secretion and increased fat deposition / weight gain.      -- Eat more lean meats, proteins, eggs, nuts, vegetables.      Resulted Orders   Hemoglobin A1c   Result Value Ref Range    Hemoglobin A1C 6.5 (H) 4.0 - 6.0 %   Lipid Profile   Result Value Ref Range    Cholesterol 186 <200 mg/dL    Triglycerides 127 <150 mg/dL    HDL Cholesterol 49 23 - 92 mg/dL    LDL Cholesterol Calculated 112 (H) <100 mg/dL      Comment:      Above desirable:  100-129 mg/dl  Borderline High:  130-159 mg/dL  High:             160-189 mg/dL  Very high:       >189 mg/dl      Non HDL Cholesterol 137 (H) <130 mg/dL      Comment:      Above Desirable:  130-159 mg/dl  Borderline high:  160-189 mg/dl  High:             190-219 mg/dl  Very high:       >219 mg/dl     Comprehensive metabolic panel   Result Value Ref Range    Sodium 138 134 - 144 mmol/L    Potassium 3.3 (L) 3.5 - 5.1 mmol/L    Chloride 98 98 - 107 mmol/L    Carbon Dioxide 30 21 - 31 mmol/L    Anion Gap 10 3 - 14 mmol/L    Glucose 123 (H) 70 - 105 mg/dL    Urea Nitrogen 21 7 - 25 mg/dL    Creatinine 0.87 0.60 - 1.20 mg/dL    GFR Estimate 65 >60 mL/min/[1.73_m2]    GFR Estimate If Black 79 >60 mL/min/[1.73_m2]    Calcium  9.5 8.6 - 10.3 mg/dL    Bilirubin Total 1.1 (H) 0.3 - 1.0 mg/dL    Albumin 4.6 3.5 - 5.7 g/dL    Protein Total 8.4 6.4 - 8.9 g/dL    Alkaline Phosphatase 53 34 - 104 U/L    ALT 28 7 - 52 U/L    AST 28 13 - 39 U/L   CBC with platelets   Result Value Ref Range    WBC 5.4 4.0 - 11.0 10e9/L    RBC Count 4.98 3.8 - 5.2 10e12/L    Hemoglobin 15.1 11.7 - 15.7 g/dL    Hematocrit 44.3 35.0 - 47.0 %    MCV 89 78 - 100 fl    MCH 30.3 26.5 - 33.0 pg    MCHC 34.1 31.5 - 36.5 g/dL    RDW 13.2 10.0 - 15.0 %    Platelet Count 220 150 - 450 10e9/L   Thyrotropin GH   Result Value Ref Range    Thyrotropin 1.06 0.34 - 5.60 IU/mL   *UA reflex to Microscopic   Result Value Ref Range    Color Urine Yellow     Appearance Urine Clear     Glucose Urine Negative NEG^Negative mg/dL    Bilirubin Urine Negative NEG^Negative    Ketones Urine Trace (A) NEG^Negative mg/dL    Specific Gravity Urine 1.025 1.000 - 1.030    Blood Urine Negative NEG^Negative    pH Urine 5.0 5.0 - 9.0 pH    Protein Albumin Urine Negative NEG^Negative mg/dL    Urobilinogen Urine 0.2 0.2 - 1.0 EU/dL    Nitrite Urine Negative NEG^Negative    Leukocyte Esterase Urine Trace (A) NEG^Negative    Source Midstream Urine    Albumin Random Urine Quantitative with Creat Ratio   Result Value Ref Range    Creatinine Urine 41 mg/dL    Albumin Urine mg/L <5 mg/L    Albumin Urine mg/g Cr Unable to calculate due to low value 0 - 25 mg/g Cr   Urine Microscopic   Result Value Ref Range    WBC Urine 0 - 5 OTO5^0 - 5 /HPF    RBC Urine O - 2 OTO2^O - 2 /HPF    Squamous Epithelial /LPF Urine Few FEW^Few /LPF       If you have any questions or concerns, please call the clinic at the number listed above.       Sincerely,        Alexander Bentley MD

## 2020-01-03 ASSESSMENT — ANXIETY QUESTIONNAIRES: GAD7 TOTAL SCORE: 0

## 2020-01-10 LAB — COLOGUARD-ABSTRACT: POSITIVE

## 2020-01-17 ENCOUNTER — TELEPHONE (OUTPATIENT)
Dept: INTERNAL MEDICINE | Facility: OTHER | Age: 65
End: 2020-01-17

## 2020-01-17 DIAGNOSIS — R19.5 POSITIVE COLORECTAL CANCER SCREENING USING COLOGUARD TEST: Primary | ICD-10-CM

## 2020-01-17 NOTE — TELEPHONE ENCOUNTER
Left message to call back.  Calling patient to inform of a positive cologuard. Referral for colonoscopy is in and they will call with an appointment. Kya Rai LPN .............1/17/2020  10:33 AM  Kya Rai LPN .............1/17/2020  10:34 AM

## 2020-01-17 NOTE — TELEPHONE ENCOUNTER
After the patient's full name and date of birth was verified, the patient was notified of the below information.  Jazmine Patino LPN on 1/17/2020 at 2:30 PM

## 2020-01-17 NOTE — TELEPHONE ENCOUNTER
1. Positive colorectal cancer screening using Cologuard test  - GASTROENTEROLOGY ADULT REF PROCEDURE ONLY  - they will call with date/time of appointment.      Needs colonoscopy.  Orders placed    Alexander Bentley MD

## 2020-01-17 NOTE — TELEPHONE ENCOUNTER
FYI    Patient had a positive cologuard.    A fax was sent yesterday    If you don't get the fax in the next day or two they will be happy to re-fax.    Aiyana Ortiz LPN  1/17/2020  3:20 PM

## 2020-01-20 DIAGNOSIS — Z12.11 SPECIAL SCREENING FOR MALIGNANT NEOPLASMS, COLON: Primary | ICD-10-CM

## 2020-01-20 NOTE — TELEPHONE ENCOUNTER
Screening Questions for the Scheduling of Screening Colonoscopies   (If Colonoscopy is diagnostic, Provider should review the chart before scheduling.)  Are you younger than 50 or older than 80?  NO   Do you take aspirin or fish oil?  NO  (if yes, tell patient to stop 1 week prior to Colonoscopy)  Do you take warfarin (Coumadin), clopidogrel (Plavix), apixaban (Eliquis), dabigatram (Pradaxa), rivaroxaban (Xarelto) or any blood thinner? NO   Do you use oxygen at home?  NO   Do you have kidney disease? NO   Are you on dialysis? NO   Have you had a stroke or heart attack in the last year? NO   Have you had a stent in your heart or any blood vessel in the last year? NO   Have you had a transplant of any organ? NO   Have you had a colonoscopy or upper endoscopy (EGD) before? YES          When?  11 YRS AGO   Date of scheduled Colonoscopy. 02/07/2020  Provider [facility]   [facility]  - [place]

## 2020-01-21 RX ORDER — POLYETHYLENE GLYCOL 3350, SODIUM CHLORIDE, SODIUM BICARBONATE, POTASSIUM CHLORIDE 420; 11.2; 5.72; 1.48 G/4L; G/4L; G/4L; G/4L
4000 POWDER, FOR SOLUTION ORAL ONCE
Qty: 4000 ML | Refills: 0 | Status: ON HOLD | OUTPATIENT
Start: 2020-01-21 | End: 2020-02-07

## 2020-01-21 RX ORDER — BISACODYL 5 MG/1
TABLET, DELAYED RELEASE ORAL
Qty: 2 TABLET | Refills: 0 | Status: ON HOLD | OUTPATIENT
Start: 2020-01-21 | End: 2020-02-07

## 2020-01-24 ENCOUNTER — HOSPITAL ENCOUNTER (OUTPATIENT)
Dept: BONE DENSITY | Facility: OTHER | Age: 65
Discharge: HOME | End: 2020-01-24
Attending: INTERNAL MEDICINE | Admitting: INTERNAL MEDICINE
Payer: COMMERCIAL

## 2020-01-24 DIAGNOSIS — Z78.0 ASYMPTOMATIC MENOPAUSE: ICD-10-CM

## 2020-01-24 PROCEDURE — 77080 DXA BONE DENSITY AXIAL: CPT

## 2020-02-07 ENCOUNTER — ANESTHESIA EVENT (OUTPATIENT)
Dept: SURGERY | Facility: OTHER | Age: 65
End: 2020-02-07
Payer: COMMERCIAL

## 2020-02-07 ENCOUNTER — HOSPITAL ENCOUNTER (OUTPATIENT)
Facility: OTHER | Age: 65
Discharge: HOME | End: 2020-02-07
Attending: SURGERY | Admitting: SURGERY
Payer: COMMERCIAL

## 2020-02-07 ENCOUNTER — ANESTHESIA (OUTPATIENT)
Dept: SURGERY | Facility: OTHER | Age: 65
End: 2020-02-07
Payer: COMMERCIAL

## 2020-02-07 VITALS
OXYGEN SATURATION: 98 % | TEMPERATURE: 96.8 F | RESPIRATION RATE: 16 BRPM | HEART RATE: 59 BPM | DIASTOLIC BLOOD PRESSURE: 103 MMHG | WEIGHT: 160 LBS | BODY MASS INDEX: 27.9 KG/M2 | SYSTOLIC BLOOD PRESSURE: 128 MMHG

## 2020-02-07 DIAGNOSIS — K63.5 POLYP OF ASCENDING COLON, UNSPECIFIED TYPE: Primary | ICD-10-CM

## 2020-02-07 DIAGNOSIS — K63.5 POLYP OF SIGMOID COLON, UNSPECIFIED TYPE: ICD-10-CM

## 2020-02-07 PROCEDURE — 25800030 ZZH RX IP 258 OP 636: Performed by: SURGERY

## 2020-02-07 PROCEDURE — 40000010 ZZH STATISTIC ANES STAT CODE-CRNA PER MINUTE: Performed by: SURGERY

## 2020-02-07 PROCEDURE — 25000128 H RX IP 250 OP 636: Performed by: NURSE ANESTHETIST, CERTIFIED REGISTERED

## 2020-02-07 PROCEDURE — 45380 COLONOSCOPY AND BIOPSY: CPT | Performed by: SURGERY

## 2020-02-07 PROCEDURE — 25000125 ZZHC RX 250: Performed by: NURSE ANESTHETIST, CERTIFIED REGISTERED

## 2020-02-07 PROCEDURE — 25000125 ZZHC RX 250: Performed by: SURGERY

## 2020-02-07 PROCEDURE — 88305 TISSUE EXAM BY PATHOLOGIST: CPT

## 2020-02-07 PROCEDURE — 45380 COLONOSCOPY AND BIOPSY: CPT | Performed by: NURSE ANESTHETIST, CERTIFIED REGISTERED

## 2020-02-07 RX ORDER — SODIUM CHLORIDE, SODIUM LACTATE, POTASSIUM CHLORIDE, CALCIUM CHLORIDE 600; 310; 30; 20 MG/100ML; MG/100ML; MG/100ML; MG/100ML
INJECTION, SOLUTION INTRAVENOUS CONTINUOUS
Status: DISCONTINUED | OUTPATIENT
Start: 2020-02-07 | End: 2020-02-07 | Stop reason: HOSPADM

## 2020-02-07 RX ORDER — LIDOCAINE HYDROCHLORIDE 20 MG/ML
INJECTION, SOLUTION INFILTRATION; PERINEURAL PRN
Status: DISCONTINUED | OUTPATIENT
Start: 2020-02-07 | End: 2020-02-07

## 2020-02-07 RX ORDER — ONDANSETRON 4 MG/1
4 TABLET, ORALLY DISINTEGRATING ORAL EVERY 6 HOURS PRN
Status: DISCONTINUED | OUTPATIENT
Start: 2020-02-07 | End: 2020-02-07 | Stop reason: HOSPADM

## 2020-02-07 RX ORDER — PROPOFOL 10 MG/ML
INJECTION, EMULSION INTRAVENOUS PRN
Status: DISCONTINUED | OUTPATIENT
Start: 2020-02-07 | End: 2020-02-07

## 2020-02-07 RX ORDER — PROPOFOL 10 MG/ML
INJECTION, EMULSION INTRAVENOUS CONTINUOUS PRN
Status: DISCONTINUED | OUTPATIENT
Start: 2020-02-07 | End: 2020-02-07

## 2020-02-07 RX ORDER — FLUMAZENIL 0.1 MG/ML
0.2 INJECTION, SOLUTION INTRAVENOUS
Status: DISCONTINUED | OUTPATIENT
Start: 2020-02-07 | End: 2020-02-07 | Stop reason: HOSPADM

## 2020-02-07 RX ORDER — LIDOCAINE 40 MG/G
CREAM TOPICAL
Status: DISCONTINUED | OUTPATIENT
Start: 2020-02-07 | End: 2020-02-07 | Stop reason: HOSPADM

## 2020-02-07 RX ORDER — NALOXONE HYDROCHLORIDE 0.4 MG/ML
.1-.4 INJECTION, SOLUTION INTRAMUSCULAR; INTRAVENOUS; SUBCUTANEOUS
Status: DISCONTINUED | OUTPATIENT
Start: 2020-02-07 | End: 2020-02-07 | Stop reason: HOSPADM

## 2020-02-07 RX ORDER — ONDANSETRON 2 MG/ML
4 INJECTION INTRAMUSCULAR; INTRAVENOUS EVERY 6 HOURS PRN
Status: DISCONTINUED | OUTPATIENT
Start: 2020-02-07 | End: 2020-02-07 | Stop reason: HOSPADM

## 2020-02-07 RX ORDER — ONDANSETRON 2 MG/ML
4 INJECTION INTRAMUSCULAR; INTRAVENOUS
Status: DISCONTINUED | OUTPATIENT
Start: 2020-02-07 | End: 2020-02-07 | Stop reason: HOSPADM

## 2020-02-07 RX ADMIN — SODIUM CHLORIDE, POTASSIUM CHLORIDE, SODIUM LACTATE AND CALCIUM CHLORIDE: 600; 310; 30; 20 INJECTION, SOLUTION INTRAVENOUS at 10:42

## 2020-02-07 RX ADMIN — PROPOFOL 135 MCG/KG/MIN: 10 INJECTION, EMULSION INTRAVENOUS at 11:02

## 2020-02-07 RX ADMIN — PROPOFOL 80 MG: 10 INJECTION, EMULSION INTRAVENOUS at 11:02

## 2020-02-07 RX ADMIN — LIDOCAINE HYDROCHLORIDE 60 MG: 20 INJECTION, SOLUTION INFILTRATION; PERINEURAL at 11:02

## 2020-02-07 NOTE — ANESTHESIA CARE TRANSFER NOTE
Patient: Margie Mccoy    Procedure(s):  COLONOSCOPY, WITH POLYPECTOMY AND BIOPSY    Diagnosis: Positive colorectal cancer screening using Cologuard test [R19.5]  Diagnosis Additional Information: No value filed.    Anesthesia Type:   MAC     Note:  Airway :Room Air  Patient transferred to:Phase II  Handoff Report: Identifed the Patient, Identified the Reponsible Provider, Reviewed the pertinent medical history, Discussed the surgical course, Reviewed Intra-OP anesthesia mangement and issues during anesthesia, Set expectations for post-procedure period and Allowed opportunity for questions and acknowledgement of understanding      Vitals: (Last set prior to Anesthesia Care Transfer)    CRNA VITALS  2/7/2020 1103 - 2/7/2020 1136      2/7/2020             Pulse:  60    Ht Rate:  60    SpO2:  98 %                Electronically Signed By: PILAR ROJAS CRNA  February 7, 2020  11:36 AM

## 2020-02-07 NOTE — ANESTHESIA PREPROCEDURE EVALUATION
Anesthesia Pre-Procedure Evaluation    Patient: Margie Mccoy   MRN: [id number] : 1955          Preoperative Diagnosis: Positive colorectal cancer screening using Cologuard test [R19.5]    Procedure(s):  COLONOSCOPY    Past Medical History:   Diagnosis Date     Rosacea 2018     Past Surgical History:   Procedure Laterality Date     COLONOSCOPY      neg     FUSION CERVICAL ANTERIOR TWO LEVELS  2014    [facility]--Dr Gray     RELEASE TRIGGER FINGER Right 2018    Procedure: RELEASE TRIGGER FINGER;  Right Thumb Trigger Release;  Surgeon: Wagner Newton MD;  Location: [facility]     TUBAL LIGATION      22 years old       Anesthesia Evaluation     . Pt has had prior anesthetic.     History of anesthetic complications   - PONV        ROS/MED HX    ENT/Pulmonary:     (+)LANG risk factors hypertension, obese, , . .    Neurologic:  - neg neurologic ROS     Cardiovascular:     (+) Dyslipidemia, hypertension----. : . . . :. .       METS/Exercise Tolerance:     Hematologic:         Musculoskeletal:         GI/Hepatic:     (+) GERD Asymptomatic on medication,       Renal/Genitourinary:         Endo:     (+) type II DM .      Psychiatric:  - neg psychiatric ROS       Infectious Disease:  - neg infectious disease ROS       Malignancy:      - no malignancy   Other:    - neg other ROS                      Physical Exam  Normal systems: pulmonary and dental    Airway   Mallampati: II  TM distance: >3 FB  Neck ROM: full    Dental     Cardiovascular   Rhythm and rate: normal      Pulmonary             Lab Results   Component Value Date    WBC 5.4 2020    HGB 15.1 2020    HCT 44.3 2020     2020     2020    POTASSIUM 3.3 (L) 2020    CHLORIDE 98 2020    CO2 30 2020    BUN 21 2020    CR 0.87 2020     (H) 2020    FREDERICK 9.5 2020    ALBUMIN 4.6 2020    PROTTOTAL 8.4 2020    ALT 28 2020    AST 28 2020    ALKPHOS 53  "01/02/2020    BILITOTAL 1.1 (H) 01/02/2020       Preop Vitals  BP Readings from Last 3 Encounters:   02/07/20 (!) 152/93   01/02/20 124/68   10/10/19 132/86    Pulse Readings from Last 3 Encounters:   02/07/20 66   01/02/20 64   10/10/19 60      Resp Readings from Last 3 Encounters:   02/07/20 16   01/02/20 16   10/10/19 12    SpO2 Readings from Last 3 Encounters:   02/07/20 99%   09/21/18 97%   09/12/18 98%      Temp Readings from Last 1 Encounters:   02/07/20 97.4  F (36.3  C) (Tympanic)    Ht Readings from Last 1 Encounters:   10/10/19 1.613 m (5' 3.5\")      Wt Readings from Last 1 Encounters:   02/07/20 72.6 kg (160 lb)    Estimated body mass index is 27.9 kg/m  as calculated from the following:    Height as of 10/10/19: 1.613 m (5' 3.5\").    Weight as of this encounter: 72.6 kg (160 lb).       Anesthesia Plan      History & Physical Review      ASA Status:  3 .    NPO Status:  > 6 hours    Plan for MAC with Propofol induction.          Postoperative Care      Consents  Anesthetic plan, risks, benefits and alternatives discussed with:  Patient and Spouse..                 PILAR CRENSHAW CRNA  "

## 2020-02-07 NOTE — H&P
PRE-PROCEDURE NOTE    CHIEFCOMPLAINT / REASON FOR PROCEDURE:  + cologuard test    PERTINENT HISTORY   Patient complains of + cologuard test. Previous colonoscopy 2007. No family history of colon polyps or colon cancer.    Past Medical History:   Diagnosis Date     Rosacea 8/13/2018     Past Surgical History:   Procedure Laterality Date     COLONOSCOPY  2007    neg     FUSION CERVICAL ANTERIOR TWO LEVELS  2014    [facility]--Dr Gray     RELEASE TRIGGER FINGER Right 9/21/2018    Procedure: RELEASE TRIGGER FINGER;  Right Thumb Trigger Release;  Surgeon: Wagner Newton MD;  Location: [facility]     TUBAL LIGATION      22 years old     Other:  None  Bleeding tendencies:  No    Relevant Family History:  none    Relevant Social History:  none    A relevant review of systems was performed and wasNegative. See HPI.    ALLERGIES/SENSITIVITIES:   Allergies   Allergen Reactions     Demeclocycline Nausea     Needs to take with food     Hibiclens      dermatitis     Oxycodone Nausea     Tetracyclines GI Disturbance     Needs to take with food        CURRENT MEDICATIONS:    Current Facility-Administered Medications   Medication     lactated ringers infusion     lidocaine (LMX4) cream     lidocaine 1 % 0.1-1 mL     ondansetron (ZOFRAN) injection 4 mg     sodium chloride (PF) 0.9% PF flush 3 mL     sodium chloride (PF) 0.9% PF flush 3 mL     No current facility-administered medications on file prior to encounter.   metFORMIN (GLUCOPHAGE-XR) 500 MG 24 hr tablet, Take 4 tablets (2,000 mg) by mouth daily (with dinner) --- stop regular metformin tablets (Patient taking differently: Take 2,000 mg by mouth daily (with dinner) Adjusts based on blood glucose)  metoprolol succinate ER (TOPROL-XL) 25 MG 24 hr tablet, Take 1 tablet (25 mg) by mouth daily  triamterene-HCTZ (DYAZIDE) 37.5-25 MG capsule, Take 1 capsule by mouth daily  B-D ULTRA-FINE 33 LANCETS MISC, As directed once daily. DX: E11.9 Diabetes Mellitus Type 2 controlled.  blood  glucose ([employer] TEST STRIPS) test strip, Dispense item covered by pt ins. E11.65 NIDDM type II, uncontrolled - Test 2 times/day. Reason: High A1C  Blood Glucose Monitoring Suppl (FIFTY50 GLUCOSE METER 2.0) w/Device KIT, Dispense meter, test strips, lancets covered by pt ins. E11.65 NIDDM type II, uncontrolled - Test 2 times/day. Reason: High A1C  doxycycline (VIBRAMYCIN) 100 MG capsule, Take 1 capsule (100 mg) by mouth daily As needed          PRE-SEDATION ASSESSMENT:    BP (!) 152/93   Pulse 66   Temp 97.4  F (36.3  C) (Tympanic)   Resp 16   Wt 72.6 kg (160 lb)   SpO2 99%   BMI 27.90 kg/m    Lung Exam:Normal  Heart Exam:  Normal    Comment(s):      IMPRESSION:  + cologuard jesusita.    PLAN:  I discussed diagnostic colonoscopy with the patient. Anesthesia coverage requested due to ASA class 3.

## 2020-02-07 NOTE — DISCHARGE INSTRUCTIONS
Procedure you had done: colonoscopy with biopsies  Your health care provider is:  Alexander Bentley  Your surgeon is Dr. Sydney Joseph.   Please call your health care provider or surgeon at [phone] if:    - you feel you are getting worse or having an increase in problems    - fever greater than 101 degrees  - increasing shortness of breath or chest pain  - any signs of infection (increasing redness, swelling, tenderness, warmth, change in appearance, or  increased drainage)  - blood in your urine or stool  - coughing or vomiting blood  - nausea (upset stomach) and vomiting and/or diarrhea that will not stop  - severe pain that is not relieved by medicine, rest or ice  You have had medications for sedation. Please be aware that this can cause drowsiness and impaired judgment for up to 24 hours after your procedure. Do not drive, operate power tools or drink alcohol for 24 hours.  If samples were taken-you will get a phone call and a letter with your results in the next 7-10 days. If you don't get results, please call and let us know!

## 2020-02-07 NOTE — OP NOTE
PROCEDURE NOTE    SURGEON: Sydney Joseph MD.    PRE-OP DIAGNOSIS:  Diagnostic Colonoscopy, + cologuard      POST-OP DIAGNOSIS: small colon polyps    PROCEDURE:  Colonoscopy with removal of polyps    SPECIMEN:  Right and sigmoid polypoid mucosa    ANESTHESIA:  See anesthesia note, coverage requested due to ASA class 3    ESTIMATED BLOOD LOSS: none    COMPLICATIONS:  None    INDICATION FOR THE PROCEDURE: The patient is a 64 year old female. The patient presents with + cologuard. I explained to the patient the risks, benefits and alternatives to diagnostic colonoscopy for evaluating for colon polyps and colon cancer. We specifically discussed the risks of bleeding, infection, perforation, potential inability to reach the cecum and the risks of sedation. The patient's questions were answered and the patient wished to proceed. Informed consent paperwork was completed.    PROCEDURE: The patient was taken to the endoscopy suite. Appropriate monitors were attached. The patient was placed in the left lateral decubitus position.Timeout was performed confirming the patient's identity and procedure to be performed. After appropriate sedation was confirmed, digital rectal exam was performed. There was normal tone and no gross abnormality was noted. The lubricated colonoscope was introduced into the anus the colon was insufflated with air. The prep quality was adequate. Under direct visualization the scope was advanced to the cecum. The ileocecal valve was intubated and the terminal ileum inspected. No gross abnormality was noted. The scope was withdrawn back into the cecum. The mucosa of the colon was inspected while withdrawing the scope. Small areas of polypoid mucosa without well defined polyp were noted in the right colon and sigmoid colon. These were removed with cold forceps. The scope was retroflexed in the rectum and the anorectal junction was inspected. No abnormalities were noted. The scope was returned to a neutral  position and the colon was decompressed. The scope was removed. The patient tolerated the procedure with no immediately apparent complication. The patient was taken to recovery in stable condition.    FOLLOW UP:RECOMMEND high fiber diet, will call with pathology results.

## 2020-02-07 NOTE — ANESTHESIA POSTPROCEDURE EVALUATION
Patient: Margie Mccoy    Procedure(s):  COLONOSCOPY, WITH POLYPECTOMY AND BIOPSY    Diagnosis:Positive colorectal cancer screening using Cologuard test [R19.5]  Diagnosis Additional Information: No value filed.    Anesthesia Type:  MAC    Note:  Anesthesia Post Evaluation    Patient location during evaluation: Phase 2  Patient participation: Able to fully participate in evaluation  Level of consciousness: awake and alert  Pain management: adequate  Airway patency: patent  Cardiovascular status: acceptable  Respiratory status: acceptable  Hydration status: acceptable  PONV: none             Last vitals:  Vitals:    02/07/20 1145 02/07/20 1200 02/07/20 1215   BP: 105/68 120/57 (!) 128/103   Pulse: 62 54 59   Resp:      Temp:      SpO2: 93% 97% 98%         Electronically Signed By: PILAR CRENSHAW CRNA  February 7, 2020  12:41 PM

## 2020-02-07 NOTE — OR NURSING
Patient has been discharged to home at 1240 via ambulatory accompanied by her .    Written discharge instructions were provided to patient.  Prescriptions were none. She denies any pain, nausea, or dizziness upon discharge.      Patient and adult caring for them verbalize understanding of discharge instructions including no driving until tomorrow and no longer taking narcotic pain medications - no operating mechanical equipment and no making any important decisions.They understand reason for discharge, and necessary follow-up appointments.      Ana Mrechant RN

## 2020-03-06 ENCOUNTER — TRANSFERRED RECORDS (OUTPATIENT)
Dept: HEALTH INFORMATION MANAGEMENT | Facility: OTHER | Age: 65
End: 2020-03-06

## 2020-03-06 LAB — RETINOPATHY: NEGATIVE

## 2020-03-11 ENCOUNTER — HEALTH MAINTENANCE LETTER (OUTPATIENT)
Age: 65
End: 2020-03-11

## 2020-04-13 DIAGNOSIS — M25.512 BILATERAL SHOULDER PAIN, UNSPECIFIED CHRONICITY: Primary | ICD-10-CM

## 2020-04-13 DIAGNOSIS — M25.511 BILATERAL SHOULDER PAIN, UNSPECIFIED CHRONICITY: Primary | ICD-10-CM

## 2020-04-20 ENCOUNTER — HOSPITAL ENCOUNTER (OUTPATIENT)
Dept: GENERAL RADIOLOGY | Facility: OTHER | Age: 65
End: 2020-04-20
Attending: ORTHOPAEDIC SURGERY
Payer: COMMERCIAL

## 2020-04-20 ENCOUNTER — OFFICE VISIT (OUTPATIENT)
Dept: ORTHOPEDICS | Facility: OTHER | Age: 65
End: 2020-04-20
Attending: ORTHOPAEDIC SURGERY
Payer: COMMERCIAL

## 2020-04-20 DIAGNOSIS — M25.511 BILATERAL SHOULDER PAIN, UNSPECIFIED CHRONICITY: ICD-10-CM

## 2020-04-20 DIAGNOSIS — M25.512 BILATERAL SHOULDER PAIN, UNSPECIFIED CHRONICITY: Primary | ICD-10-CM

## 2020-04-20 DIAGNOSIS — E78.2 MIXED HYPERLIPIDEMIA: ICD-10-CM

## 2020-04-20 DIAGNOSIS — M25.511 BILATERAL SHOULDER PAIN, UNSPECIFIED CHRONICITY: Primary | ICD-10-CM

## 2020-04-20 DIAGNOSIS — M25.512 BILATERAL SHOULDER PAIN, UNSPECIFIED CHRONICITY: ICD-10-CM

## 2020-04-20 DIAGNOSIS — E11.9 CONTROLLED TYPE 2 DIABETES MELLITUS WITHOUT COMPLICATION, WITHOUT LONG-TERM CURRENT USE OF INSULIN (H): ICD-10-CM

## 2020-04-20 LAB
ALBUMIN SERPL-MCNC: 4.5 G/DL (ref 3.5–5.7)
ALBUMIN UR-MCNC: NEGATIVE MG/DL
ALP SERPL-CCNC: 65 U/L (ref 34–104)
ALT SERPL W P-5'-P-CCNC: 15 U/L (ref 7–52)
ANION GAP SERPL CALCULATED.3IONS-SCNC: 8 MMOL/L (ref 3–14)
APPEARANCE UR: CLEAR
AST SERPL W P-5'-P-CCNC: 20 U/L (ref 13–39)
BILIRUB SERPL-MCNC: 0.9 MG/DL (ref 0.3–1)
BILIRUB UR QL STRIP: NEGATIVE
BUN SERPL-MCNC: 20 MG/DL (ref 7–25)
CALCIUM SERPL-MCNC: 10.3 MG/DL (ref 8.6–10.3)
CHLORIDE SERPL-SCNC: 100 MMOL/L (ref 98–107)
CHOLEST SERPL-MCNC: 192 MG/DL
CO2 SERPL-SCNC: 31 MMOL/L (ref 21–31)
COLOR UR AUTO: YELLOW
CREAT SERPL-MCNC: 0.87 MG/DL (ref 0.6–1.2)
ERYTHROCYTE [DISTWIDTH] IN BLOOD BY AUTOMATED COUNT: 12.7 % (ref 10–15)
GFR SERPL CREATININE-BSD FRML MDRD: 65 ML/MIN/{1.73_M2}
GLUCOSE SERPL-MCNC: 125 MG/DL (ref 70–105)
GLUCOSE UR STRIP-MCNC: NEGATIVE MG/DL
HBA1C MFR BLD: 6.1 % (ref 4–6)
HCT VFR BLD AUTO: 45.1 % (ref 35–47)
HDLC SERPL-MCNC: 53 MG/DL (ref 23–92)
HGB BLD-MCNC: 15.1 G/DL (ref 11.7–15.7)
HGB UR QL STRIP: NEGATIVE
KETONES UR STRIP-MCNC: NEGATIVE MG/DL
LDLC SERPL CALC-MCNC: 119 MG/DL
LEUKOCYTE ESTERASE UR QL STRIP: NEGATIVE
MCH RBC QN AUTO: 30 PG (ref 26.5–33)
MCHC RBC AUTO-ENTMCNC: 33.5 G/DL (ref 31.5–36.5)
MCV RBC AUTO: 90 FL (ref 78–100)
NITRATE UR QL: NEGATIVE
NONHDLC SERPL-MCNC: 139 MG/DL
PH UR STRIP: 5.5 PH (ref 5–9)
PLATELET # BLD AUTO: 235 10E9/L (ref 150–450)
POTASSIUM SERPL-SCNC: 3.6 MMOL/L (ref 3.5–5.1)
PROT SERPL-MCNC: 8.5 G/DL (ref 6.4–8.9)
RBC # BLD AUTO: 5.04 10E12/L (ref 3.8–5.2)
SODIUM SERPL-SCNC: 139 MMOL/L (ref 134–144)
SOURCE: NORMAL
SP GR UR STRIP: <1.005 (ref 1–1.03)
TRIGL SERPL-MCNC: 98 MG/DL
TSH SERPL DL<=0.05 MIU/L-ACNC: 2.34 IU/ML (ref 0.34–5.6)
UROBILINOGEN UR STRIP-ACNC: 0.2 EU/DL (ref 0.2–1)
WBC # BLD AUTO: 6.1 10E9/L (ref 4–11)

## 2020-04-20 PROCEDURE — 84443 ASSAY THYROID STIM HORMONE: CPT | Mod: ZL | Performed by: INTERNAL MEDICINE

## 2020-04-20 PROCEDURE — 99213 OFFICE O/P EST LOW 20 MIN: CPT | Mod: 25 | Performed by: ORTHOPAEDIC SURGERY

## 2020-04-20 PROCEDURE — 20610 DRAIN/INJ JOINT/BURSA W/O US: CPT | Mod: 50 | Performed by: ORTHOPAEDIC SURGERY

## 2020-04-20 PROCEDURE — 82043 UR ALBUMIN QUANTITATIVE: CPT | Mod: ZL | Performed by: INTERNAL MEDICINE

## 2020-04-20 PROCEDURE — G0463 HOSPITAL OUTPT CLINIC VISIT: HCPCS

## 2020-04-20 PROCEDURE — 25000128 H RX IP 250 OP 636: Performed by: ORTHOPAEDIC SURGERY

## 2020-04-20 PROCEDURE — 80053 COMPREHEN METABOLIC PANEL: CPT | Mod: ZL | Performed by: INTERNAL MEDICINE

## 2020-04-20 PROCEDURE — 85027 COMPLETE CBC AUTOMATED: CPT | Mod: ZL | Performed by: INTERNAL MEDICINE

## 2020-04-20 PROCEDURE — 73030 X-RAY EXAM OF SHOULDER: CPT | Mod: 50

## 2020-04-20 PROCEDURE — 81003 URINALYSIS AUTO W/O SCOPE: CPT | Mod: ZL | Performed by: INTERNAL MEDICINE

## 2020-04-20 PROCEDURE — G0463 HOSPITAL OUTPT CLINIC VISIT: HCPCS | Mod: 25

## 2020-04-20 PROCEDURE — 83036 HEMOGLOBIN GLYCOSYLATED A1C: CPT | Mod: ZL | Performed by: INTERNAL MEDICINE

## 2020-04-20 PROCEDURE — 25000125 ZZHC RX 250: Performed by: ORTHOPAEDIC SURGERY

## 2020-04-20 PROCEDURE — 36415 COLL VENOUS BLD VENIPUNCTURE: CPT | Mod: ZL | Performed by: INTERNAL MEDICINE

## 2020-04-20 PROCEDURE — 80061 LIPID PANEL: CPT | Mod: ZL | Performed by: INTERNAL MEDICINE

## 2020-04-20 RX ORDER — TRIAMCINOLONE ACETONIDE 40 MG/ML
40 INJECTION, SUSPENSION INTRA-ARTICULAR; INTRAMUSCULAR ONCE
Status: COMPLETED | OUTPATIENT
Start: 2020-04-20 | End: 2020-04-20

## 2020-04-20 RX ORDER — LIDOCAINE HYDROCHLORIDE 10 MG/ML
4 INJECTION, SOLUTION EPIDURAL; INFILTRATION; INTRACAUDAL; PERINEURAL ONCE
Status: COMPLETED | OUTPATIENT
Start: 2020-04-20 | End: 2020-04-20

## 2020-04-20 RX ADMIN — TRIAMCINOLONE ACETONIDE 40 MG: 40 INJECTION, SUSPENSION INTRA-ARTICULAR; INTRAMUSCULAR at 10:02

## 2020-04-20 RX ADMIN — LIDOCAINE HYDROCHLORIDE 4 ML: 10 INJECTION, SOLUTION EPIDURAL; INFILTRATION; INTRACAUDAL; PERINEURAL at 10:01

## 2020-04-20 RX ADMIN — TRIAMCINOLONE ACETONIDE 40 MG: 40 INJECTION, SUSPENSION INTRA-ARTICULAR; INTRAMUSCULAR at 10:01

## 2020-04-20 NOTE — PROGRESS NOTES
Visit Date:   04/20/2020      CHIEF COMPLAINT:  This is a 64-year-old female with bilateral shoulder pain.      HISTORY OF PRESENT ILLNESS:  Margie Mccoy is a 64-year-old female with bilateral shoulder pain.  She has kind of a sorted history related to both shoulders.  She has had several falls in the past with the right shoulder being the most recent fall.  She also has a history of cervical fusion, anterior, 2 levels, in the past that also complicates her history.  She states that she has fallen on both of her shoulders in the past, jamming them, and that the pain would kind of come and go and it became markedly worse within the past several months to the point where she finally sought care.  She was concerned about a possible rotator cuff tear.  She notes that she has pain that goes all the way down her right arm and into her hand and prior to her cervical fusion, it was primarily her left arm that was giving her the issues.  The pain is primarily on the outside of the shoulder on the right side.  It will go all the way down her arm from time to time.  Most of the pain, however, is on the outside of her shoulders.  It is not relieved by ibuprofen, not relieved by any kind of activity.  In fact, activity makes it worse.  She can have pain just sitting and watching TV as well.      PAST MEDICAL HISTORY:   1.  Rosacea.     2.  She has type 2 diabetes.   3.  Hyperlipidemia.   4.  Hypertension.     5.  She also had a positive colorectal cancer screen as well in the past.      MEDICATIONS:  Include:   1.  Doxycycline for rosacea.   2.  Metformin.   3.  Metoprolol.   4.  Triamterene/hydrochlorothiazide.      SURGICAL HISTORY:  Significant for colonoscopy, trigger finger release on the right, anterior cervical fusion in 2014, and a colonoscopy in 2007.  History of tubal ligation in the past as well.      ALLERGIES:  INCLUDE DEMECLOCYCLINE, HIBICLENS, OXYCODONE, AND TETRACYCLINE.      SOCIAL HISTORY:  She never smoked and  uses rare alcohol.      PHYSICAL EXAMINATION:  Today, this is a 64-year-old female in no acute distress, very pleasant on examination.  Has approximately 150 degrees of forward flexion, bilateral shoulders.  Positive Neer impingement sign bilaterally.  External rotation of 45 degrees, internal rotation to L5.  She is tender to palpation at the AC joint, tender to palpation at Codman's point.  Nontender to palpation of the bicipital groove bilaterally.  She has good strength in her rotator cuff in all planes with some mild pain with testing.  She is otherwise neuro and vascularly intact in bilateral upper extremities on exam today with full range of motion of the elbows, hands, wrists, good sensation, and normal perfusion bilaterally.      IMAGING:  X-ray examination of the shoulders:  The left shoulder shows some AC joint arthritis.  There are some changes in the greater tuberosity and the undersurface of the acromion that are consistent with rotator cuff dysfunction.  The acromiohumeral distance appears to be fairly well maintained.    Review of the right shoulder shows some mild AC joint arthritis.  Plate from anterior cervical fusion is appreciated within the cervical spine.  Acromiohumeral distance is well maintained here.  The right shoulder looks a little less affected than the left.      IMPRESSION AND PLAN:  This is a 64-year-old female with I think an intact rotator cuff bilaterally.  I think she has rotator cuff tendinitis as the cause of her problem.  We have gone ahead and given her an injection into the subacromial space and bilateral shoulders today.  We are going to get her into Physical Therapy to try and treat this rotator cuff tendinitis, and we are going to obtain an MRI of her right shoulder as well.  The scapular dyskinesia that is exacerbating her cervical issues is concerning to me for a possible labral tear, which can come with trauma, so we are going to go ahead and order the MRI of that  shoulder and I will call her with the results.         GONZALO MARTINEZ MD             D: 2020   T: 2020   MT: MILADY      Name:     JEFF CHAPA   MRN:      [id number]        Account:      [id number]   :      1955           Visit Date:   2020      Document: [id number]

## 2020-04-20 NOTE — PROGRESS NOTES
Patient is here for consult on both shoulders.   Liseth Patel LPN .....................4/20/2020 9:17 AM

## 2020-04-21 ENCOUNTER — HOSPITAL ENCOUNTER (OUTPATIENT)
Dept: MRI IMAGING | Facility: OTHER | Age: 65
Discharge: HOME | End: 2020-04-21
Attending: ORTHOPAEDIC SURGERY | Admitting: ORTHOPAEDIC SURGERY
Payer: COMMERCIAL

## 2020-04-21 DIAGNOSIS — M25.511 BILATERAL SHOULDER PAIN, UNSPECIFIED CHRONICITY: ICD-10-CM

## 2020-04-21 DIAGNOSIS — M25.512 BILATERAL SHOULDER PAIN, UNSPECIFIED CHRONICITY: ICD-10-CM

## 2020-04-21 LAB
CREAT UR-MCNC: 40 MG/DL
MICROALBUMIN UR-MCNC: <5 MG/L
MICROALBUMIN/CREAT UR: NORMAL MG/G CR (ref 0–25)

## 2020-04-21 PROCEDURE — 73221 MRI JOINT UPR EXTREM W/O DYE: CPT | Mod: RT

## 2020-05-27 DIAGNOSIS — M75.81 TENDONITIS OF BOTH ROTATOR CUFFS: Primary | ICD-10-CM

## 2020-05-27 DIAGNOSIS — M75.82 TENDONITIS OF BOTH ROTATOR CUFFS: Primary | ICD-10-CM

## 2020-06-01 ENCOUNTER — HOSPITAL ENCOUNTER (OUTPATIENT)
Dept: PHYSICAL THERAPY | Facility: OTHER | Age: 65
Setting detail: THERAPIES SERIES
End: 2020-06-01
Attending: ORTHOPAEDIC SURGERY
Payer: COMMERCIAL

## 2020-06-01 PROCEDURE — 97161 PT EVAL LOW COMPLEX 20 MIN: CPT | Mod: GP

## 2020-06-01 PROCEDURE — 97110 THERAPEUTIC EXERCISES: CPT | Mod: GP

## 2020-06-02 NOTE — PROGRESS NOTES
[facility]          OUTPATIENT PHYSICAL THERAPY ORTHOPEDIC EVALUATION  PLAN OF TREATMENT FOR OUTPATIENT REHABILITATION  (COMPLETE FOR INITIAL CLAIMS ONLY)  Patient's Last Name, First Name, M.I.  YOB: 1955  Margie Mccoy    Provider s Name:  [facility]   Medical Record No.  [id number]   Start of Care Date:  06/01/20   Onset Date:  05/27/20   Type:     _X__PT   ___OT   ___SLP Medical Diagnosis:  M75.81, M75.82 (ICD-10-CM) - Tendonitis of both rotator cuffs      PT Diagnosis:  Impaired mobility, decreased strength and endurance, weakened RTC   Visits from SOC:  1      _________________________________________________________________________________  Plan of Treatment/Functional Goals:  joint mobilization, manual therapy, ROM, strengthening, stretching     Cryotherapy, Electrical stimulation, Ultrasound, Hot packs     Goals  Goal Identifier: HEP  Goal Description: Patient will demonstrate compliance and independence with her HEP in order to improve her ability to self manage her symptoms  Target Date: 06/30/20    Goal Identifier: ADL's  Goal Description: Patient will be able to complete all dressing, bathing and grooming activities with minimal increase in pain in order to improve her ability to complete ADL's  Target Date: 07/27/20    Goal Identifier: Yardwork  Goal Description: Patient will be able to complete all yard/house work with minimal increase in shoulder pain in order to improve her function around the home  Target Date: 07/27/20         Therapy Frequency:  other (see comments)  Predicted Duration of Therapy Intervention:  8 weeks    Keon Bradshaw PT                 I CERTIFY THE NEED FOR THESE SERVICES FURNISHED UNDER        THIS PLAN OF TREATMENT AND WHILE UNDER MY CARE .             Physician Signature               Date    X_____________________________________________________                             Certification Date From:  06/01/20    Certification Date To:  07/27/20    Referring Provider:  Dr. Hernandez    Initial Assessment        See Epic Evaluation Start of Care Date: 06/01/20

## 2020-06-02 NOTE — PROGRESS NOTES
06/01/20 1400   General Information   Type of Visit Initial OP Ortho PT Evaluation   Start of Care Date 06/01/20   Referring Physician Dr. Hernandez   Patient/Family Goals Statement To reduce the pain in her shoulders and get exercises to work on at home.    Orders Evaluate and Treat   Orders Comment Scapular stabilization, ROM, RTC strengthening   Date of Order 05/27/20   Certification Required? Yes   Medical Diagnosis M75.81, M75.82 (ICD-10-CM) - Tendonitis of both rotator cuffs    Surgical/Medical history reviewed Yes   Precautions/Limitations no known precautions/limitations   General Information Comments Patient is a 64 year old female referred to physical therapy with bilateral shoulder pain. She notes that her left one was injured many years ago and her right one last summer. She has a past medical history of anterior cervical fusion for 2 levels. Had corizone injections in both shoulders about a month ago. This seemed to help quite a bit. Notes that she still has troubles if she is getting a shirt, jacket on or snapping her bra. She notes that overhead motion isn't too difficult but still can give her trouble from time to time. Has some pain with combing her hair but feels like a stretching hurt. Does get some arm pain from time to time but attributes this from the cervical fusion.        Present No   Body Part(s)   Body Part(s) Shoulder   Presentation and Etiology   Pertinent history of current problem (include personal factors and/or comorbidities that impact the POC) Reports past medical history of diabetes, high blood pressure, and arthritis. She has her C5-C6 vertebra fused. Also had right trigger thumb surgery   Impairments A. Pain;B. Decreased WB tolerance;D. Decreased ROM;F. Decreased strength and endurance   Functional Limitations perform activities of daily living;perform desired leisure / sports activities   Symptom Location Bilateral shoulders   How/Where did it occur  Other  (Reports old injuries and repetitive movements)   Onset date of current episode/exacerbation 05/27/20   Chronicity Chronic   Pain rating (0-10 point scale) Best (/10);Worst (/10)   Best (/10) 2   Worst (/10) 8   Pain quality A. Sharp;C. Aching;D. Burning;E. Shooting;G. Cramping   Frequency of pain/symptoms C. With activity   Pain/symptoms are: Worse during the day   Pain/symptoms exacerbated by G. Certain positions;J. ADL   Pain/symptoms eased by E. Changing positions;F. Certain positions;I. OTC medication(s)   Progression of symptoms since onset: Improved   Prior Level of Function   Prior Level of Function-Mobility Independent   Prior Level of Function-ADLs Independent   Current Level of Function   Current Community Support Family/friend caregiver   Patient role/employment history F. Retired   Living environment House/townhome   Home/community accessibility Denies issues getting around her home   Current equipment-Gait/Locomotion None   Current equipment-ADL None   Fall Risk Screen   Fall screen completed by PT   Have you fallen 2 or more times in the past year? No   Have you fallen and had an injury in the past year? Yes   Is patient a fall risk? No   Fall screen comments Patient slipped on a wet floor. Notes that balance is not a concern.    Shoulder Objective Findings   Side (if bilateral, select both right and left) Right;Left   Observation Patient is resting comfortably in chair appearing to be in no distress   Integumentary  No significant findings   Posture Protracted shoulders, slightly forward head position   Cervical Screen (ROM, quadrant) Min limited with rotation   Neer's Test Negative   Simpson-James Test Positive   Sulcus Test Negative   Crossover Test Negative   Palpation discomfort noted with palpation to infraspinatus, teres minor, subscapularis, and rotator cuff insertion point   Right Shoulder Flexion AROM 160 degrees   Right Shoulder Flexion PROM WFL   Right Shoulder Abduction AROM 160  degrees - however reports some pain at end range   Right Shoulder ER AROM Able to reach behind her head slowly and cautiously   Right Shoulder IR AROM Painful when attempted to reach for her back belt   Right Shoulder Flexion Strength 4/5 - secondary to pain   Right Shoulder Abduction Strength 4/5 - secondary to pain   Right Shoulder ER Strength 5/5 with arm at side - too much pain with overhead rotation and testing strength   Right Shoulder IR Strength 5/5 with arm at side   Left Shoulder Flexion AROM 160 degrees   Left Shoulder Abduction AROM 160 degrees with mild discomfort at end range   Left Shoulder ER AROM More pain on the left when attempting to reach behind her head but she is able to do it. With arm at her side, she is only demonstrating approximately 20 degrees of active ER   Left Shoulder IR AROM Able to reach her back pocket   Left Shoulder Flexion Strength 4/5 secondary to pain   Left Shoulder Abduction Strength 4/5 secondary to pain   Left Shoulder ER Strength 5/5 with arm at side   Left Shoulder IR Strength 5/5 with arm at side   Planned Therapy Interventions   Planned Therapy Interventions joint mobilization;manual therapy;ROM;strengthening;stretching   Planned Modality Interventions   Planned Modality Interventions Cryotherapy;Electrical stimulation;Ultrasound;Hot packs   Clinical Impression   Criteria for Skilled Therapeutic Interventions Met yes, treatment indicated   PT Diagnosis Impaired mobility, decreased strength and endurance, weakened RTC   Influenced by the following impairments pain, weakness, stiffness   Functional limitations due to impairments reaching overhead, dressing, snapping bra, putting on jacket   Clinical Presentation Stable/Uncomplicated   Clinical Presentation Rationale minimal reported comorbidities, clinical judgement   Clinical Decision Making (Complexity) Low complexity   Therapy Frequency other (see comments)   Predicted Duration of Therapy Intervention (days/wks) 8  weeks   Risk & Benefits of therapy have been explained Yes   Patient, Family & other staff in agreement with plan of care Yes   Clinical Impression Comments Signs and symptoms consistent with bilateral rotator cuff tendonitis.  Minimal reported comorbidities. Patient is just wanting a HEP at t his time to work on from home. She is having most of her discomfort with rotational movements, and more pronounced when reaching over her head. Notes that the injection that she got last month helped significantly.    Education Assessment   Barriers to Learning No barriers   ORTHO GOALS   PT Ortho Eval Goals 1;2;3   Ortho Goal 1   Goal Identifier HEP   Goal Description Patient will demonstrate compliance and independence with her HEP in order to improve her ability to self manage her symptoms   Target Date 06/30/20   Ortho Goal 2   Goal Identifier ADL's   Goal Description Patient will be able to complete all dressing, bathing and grooming activities with minimal increase in pain in order to improve her ability to complete ADL's   Target Date 07/27/20   Ortho Goal 3   Goal Identifier Yardwork   Goal Description Patient will be able to complete all yard/house work with minimal increase in shoulder pain in order to improve her function around the home   Target Date 07/27/20   Total Evaluation Time   PT Eval, Low Complexity Minutes (31683) 30   Therapy Certification   Certification date from 06/01/20   Certification date to 07/27/20   Medical Diagnosis M75.81, M75.82 (ICD-10-CM) - Tendonitis of both rotator cuffs

## 2020-07-24 NOTE — PROGRESS NOTES
Outpatient Physical Therapy Discharge Note     Patient: Margie Mccoy  : 1955    Beginning/End Dates of Reporting Period:  2020 to 2020    Referring Provider: Dr. Hernandez    Therapy Diagnosis: Impaired mobility, decreased strength and endurance, weakened RTC     Client Self Report: See initial evaluation    Goals:  Goal Identifier HEP   Goal Description Patient will demonstrate compliance and independence with her HEP in order to improve her ability to self manage her symptoms   Target Date 20   Date Met      Progress:     Goal Identifier ADL's   Goal Description Patient will be able to complete all dressing, bathing and grooming activities with minimal increase in pain in order to improve her ability to complete ADL's   Target Date 20   Date Met      Progress:     Goal Identifier Yardwork   Goal Description Patient will be able to complete all yard/house work with minimal increase in shoulder pain in order to improve her function around the home   Target Date 20   Date Met      Progress:       Progress Toward Goals:   Not assessed this period. Discharge is unplanned. Patient did not schedule any more follow ups.     Plan:  Discharge from therapy.    Discharge:    Reason for Discharge: Patient has failed to schedule further appointments.    Equipment Issued: none    Discharge Plan: Follow up with physician as needed.

## 2020-10-01 ENCOUNTER — MYC MEDICAL ADVICE (OUTPATIENT)
Dept: INTERNAL MEDICINE | Facility: OTHER | Age: 65
End: 2020-10-01

## 2021-01-03 ENCOUNTER — HEALTH MAINTENANCE LETTER (OUTPATIENT)
Age: 66
End: 2021-01-03

## 2021-04-25 ENCOUNTER — HEALTH MAINTENANCE LETTER (OUTPATIENT)
Age: 66
End: 2021-04-25

## 2021-07-22 ENCOUNTER — OFFICE VISIT (OUTPATIENT)
Dept: INTERNAL MEDICINE | Facility: OTHER | Age: 66
End: 2021-07-22
Attending: INTERNAL MEDICINE
Payer: COMMERCIAL

## 2021-07-22 VITALS
WEIGHT: 169.6 LBS | RESPIRATION RATE: 16 BRPM | BODY MASS INDEX: 28.26 KG/M2 | TEMPERATURE: 96.7 F | DIASTOLIC BLOOD PRESSURE: 78 MMHG | HEART RATE: 64 BPM | HEIGHT: 65 IN | SYSTOLIC BLOOD PRESSURE: 138 MMHG

## 2021-07-22 DIAGNOSIS — Z00.00 ENCOUNTER FOR MEDICARE ANNUAL WELLNESS EXAM: ICD-10-CM

## 2021-07-22 DIAGNOSIS — Z23 NEED FOR 23-POLYVALENT PNEUMOCOCCAL POLYSACCHARIDE VACCINE: ICD-10-CM

## 2021-07-22 DIAGNOSIS — L71.9 ROSACEA: ICD-10-CM

## 2021-07-22 DIAGNOSIS — J30.2 SEASONAL ALLERGIC RHINITIS, UNSPECIFIED TRIGGER: ICD-10-CM

## 2021-07-22 DIAGNOSIS — E78.2 MIXED HYPERLIPIDEMIA: ICD-10-CM

## 2021-07-22 DIAGNOSIS — E11.9 CONTROLLED TYPE 2 DIABETES MELLITUS WITHOUT COMPLICATION, WITHOUT LONG-TERM CURRENT USE OF INSULIN (H): Primary | ICD-10-CM

## 2021-07-22 DIAGNOSIS — I10 BENIGN ESSENTIAL HYPERTENSION: ICD-10-CM

## 2021-07-22 DIAGNOSIS — Z71.85 VACCINE COUNSELING: ICD-10-CM

## 2021-07-22 LAB
ALBUMIN SERPL-MCNC: 4.4 G/DL (ref 3.5–5.7)
ALBUMIN UR-MCNC: NEGATIVE MG/DL
ALP SERPL-CCNC: 81 U/L (ref 34–104)
ALT SERPL W P-5'-P-CCNC: 38 U/L (ref 7–52)
ANION GAP SERPL CALCULATED.3IONS-SCNC: 8 MMOL/L (ref 3–14)
APPEARANCE UR: CLEAR
AST SERPL W P-5'-P-CCNC: 39 U/L (ref 13–39)
BILIRUB SERPL-MCNC: 1.2 MG/DL (ref 0.3–1)
BILIRUB UR QL STRIP: NEGATIVE
BUN SERPL-MCNC: 13 MG/DL (ref 7–25)
CALCIUM SERPL-MCNC: 10.1 MG/DL (ref 8.6–10.3)
CHLORIDE BLD-SCNC: 102 MMOL/L (ref 98–107)
CHOLEST SERPL-MCNC: 196 MG/DL
CO2 SERPL-SCNC: 28 MMOL/L (ref 21–31)
COLOR UR AUTO: YELLOW
CREAT SERPL-MCNC: 0.85 MG/DL (ref 0.6–1.2)
CREAT UR-MCNC: 33 MG/DL
ERYTHROCYTE [DISTWIDTH] IN BLOOD BY AUTOMATED COUNT: 12.9 % (ref 10–15)
FASTING STATUS PATIENT QL REPORTED: YES
GFR SERPL CREATININE-BSD FRML MDRD: 71 ML/MIN/1.73M2
GLUCOSE BLD-MCNC: 117 MG/DL (ref 70–105)
GLUCOSE UR STRIP-MCNC: NEGATIVE MG/DL
HBA1C MFR BLD: 7 % (ref 4–6.2)
HCT VFR BLD AUTO: 45.9 % (ref 35–47)
HDLC SERPL-MCNC: 53 MG/DL (ref 23–92)
HGB BLD-MCNC: 15.6 G/DL (ref 11.7–15.7)
HGB UR QL STRIP: NEGATIVE
KETONES UR STRIP-MCNC: NEGATIVE MG/DL
LDLC SERPL CALC-MCNC: 117 MG/DL
LEUKOCYTE ESTERASE UR QL STRIP: NEGATIVE
MCH RBC QN AUTO: 29.9 PG (ref 26.5–33)
MCHC RBC AUTO-ENTMCNC: 34 G/DL (ref 31.5–36.5)
MCV RBC AUTO: 88 FL (ref 78–100)
MICROALBUMIN UR-MCNC: <5 MG/DL
MICROALBUMIN/CREAT UR: NORMAL MG/G{CREAT}
NITRATE UR QL: NEGATIVE
NONHDLC SERPL-MCNC: 143 MG/DL
PH UR STRIP: 5 [PH] (ref 5–9)
PLATELET # BLD AUTO: 211 10E3/UL (ref 150–450)
POTASSIUM BLD-SCNC: 3.8 MMOL/L (ref 3.5–5.1)
PROT SERPL-MCNC: 8.6 G/DL (ref 6.4–8.9)
RBC # BLD AUTO: 5.22 10E6/UL (ref 3.8–5.2)
SODIUM SERPL-SCNC: 138 MMOL/L (ref 134–144)
SP GR UR STRIP: 1.01 (ref 1–1.03)
TRIGL SERPL-MCNC: 129 MG/DL
TSH SERPL DL<=0.005 MIU/L-ACNC: 1.95 MU/L (ref 0.4–4)
UROBILINOGEN UR STRIP-MCNC: NORMAL MG/DL
WBC # BLD AUTO: 6.3 10E3/UL (ref 4–11)

## 2021-07-22 PROCEDURE — 84443 ASSAY THYROID STIM HORMONE: CPT | Mod: ZL | Performed by: INTERNAL MEDICINE

## 2021-07-22 PROCEDURE — 82043 UR ALBUMIN QUANTITATIVE: CPT | Mod: ZL | Performed by: INTERNAL MEDICINE

## 2021-07-22 PROCEDURE — G0009 ADMIN PNEUMOCOCCAL VACCINE: HCPCS

## 2021-07-22 PROCEDURE — 83036 HEMOGLOBIN GLYCOSYLATED A1C: CPT | Mod: ZL | Performed by: INTERNAL MEDICINE

## 2021-07-22 PROCEDURE — G0438 PPPS, INITIAL VISIT: HCPCS | Performed by: INTERNAL MEDICINE

## 2021-07-22 PROCEDURE — 99214 OFFICE O/P EST MOD 30 MIN: CPT | Mod: 25 | Performed by: INTERNAL MEDICINE

## 2021-07-22 PROCEDURE — G0463 HOSPITAL OUTPT CLINIC VISIT: HCPCS | Mod: 25

## 2021-07-22 PROCEDURE — 80061 LIPID PANEL: CPT | Mod: ZL | Performed by: INTERNAL MEDICINE

## 2021-07-22 PROCEDURE — 85027 COMPLETE CBC AUTOMATED: CPT | Mod: ZL | Performed by: INTERNAL MEDICINE

## 2021-07-22 PROCEDURE — 90732 PPSV23 VACC 2 YRS+ SUBQ/IM: CPT

## 2021-07-22 PROCEDURE — 82040 ASSAY OF SERUM ALBUMIN: CPT | Mod: ZL | Performed by: INTERNAL MEDICINE

## 2021-07-22 PROCEDURE — 81003 URINALYSIS AUTO W/O SCOPE: CPT | Mod: ZL | Performed by: INTERNAL MEDICINE

## 2021-07-22 PROCEDURE — 36415 COLL VENOUS BLD VENIPUNCTURE: CPT | Mod: ZL | Performed by: INTERNAL MEDICINE

## 2021-07-22 RX ORDER — METFORMIN HCL 500 MG
2000 TABLET, EXTENDED RELEASE 24 HR ORAL
Qty: 360 TABLET | Refills: 3 | Status: SHIPPED | OUTPATIENT
Start: 2021-07-22 | End: 2022-10-12

## 2021-07-22 RX ORDER — CETIRIZINE HYDROCHLORIDE 10 MG/1
10 TABLET ORAL DAILY PRN
Qty: 90 TABLET | Refills: 3 | Status: SHIPPED | OUTPATIENT
Start: 2021-07-22 | End: 2022-10-12

## 2021-07-22 RX ORDER — TRIAMTERENE AND HYDROCHLOROTHIAZIDE 37.5; 25 MG/1; MG/1
1 CAPSULE ORAL EVERY OTHER DAY
Qty: 50 CAPSULE | Refills: 3 | Status: SHIPPED | OUTPATIENT
Start: 2021-07-22 | End: 2022-10-12

## 2021-07-22 RX ORDER — METOPROLOL SUCCINATE 25 MG/1
25 TABLET, EXTENDED RELEASE ORAL EVERY EVENING
Qty: 90 TABLET | Refills: 3 | Status: SHIPPED | OUTPATIENT
Start: 2021-07-22 | End: 2022-10-12

## 2021-07-22 RX ORDER — ROSUVASTATIN CALCIUM 5 MG/1
5 TABLET, COATED ORAL WEEKLY
Qty: 15 TABLET | Refills: 3 | Status: SHIPPED | OUTPATIENT
Start: 2021-07-22 | End: 2022-10-12

## 2021-07-22 RX ORDER — BETAMETHASONE DIPROPIONATE 0.5 MG/G
CREAM TOPICAL 2 TIMES DAILY
Qty: 45 G | Refills: 3 | Status: SHIPPED | OUTPATIENT
Start: 2021-07-22 | End: 2022-08-04

## 2021-07-22 RX ORDER — DOXYCYCLINE 100 MG/1
CAPSULE ORAL
Qty: 90 CAPSULE | Refills: 1 | Status: SHIPPED | OUTPATIENT
Start: 2021-07-22 | End: 2022-10-12

## 2021-07-22 ASSESSMENT — ENCOUNTER SYMPTOMS
MYALGIAS: 0
LIGHT-HEADEDNESS: 0
COUGH: 0
DIZZINESS: 0
ARTHRALGIAS: 0
WHEEZING: 0
HEMATURIA: 0
DIARRHEA: 0
WOUND: 0
VOMITING: 0
PALPITATIONS: 0
AGITATION: 0
CHILLS: 0
BRUISES/BLEEDS EASILY: 0
FEVER: 0
CONFUSION: 0
NAUSEA: 0
ABDOMINAL PAIN: 0
DYSURIA: 0
SHORTNESS OF BREATH: 0

## 2021-07-22 ASSESSMENT — MIFFLIN-ST. JEOR: SCORE: 1298.3

## 2021-07-22 ASSESSMENT — PAIN SCALES - GENERAL: PAINLEVEL: NO PAIN (0)

## 2021-07-22 NOTE — PROGRESS NOTES
Immunization Documentation    Prior to Immunization administration, verified patients identity using patient's name and date of birth. Please see IMMUNIZATIONS  and order for additional information.  Patient / Parent instructed to remain in clinic for 15 minutes and report any adverse reaction to staff immediately.    Was entire vial of medication used? Yes  Vial/Syringe: Syringe    Kya Rai LPN  7/22/2021   12:12 PM

## 2021-07-22 NOTE — PATIENT INSTRUCTIONS
Patient Education   Personalized Prevention Plan  You are due for the preventive services outlined below.  Your care team is available to assist you in scheduling these services.  If you have already completed any of these items, please share that information with your care team to update in your medical record.  Health Maintenance Due   Topic Date Due     Diabetic Foot Exam  Never done     Hepatitis C Screening  Never done     Diptheria Tetanus Pertussis (DTAP/TDAP/TD) Vaccine (1 - Tdap) Never done     Zoster (Shingles) Vaccine (1 of 2) Never done     Pneumococcal Vaccine (1 of 2 - PPSV23) 12/05/2019     A1C Lab  10/20/2020     FALL RISK ASSESSMENT  01/02/2021     Eye Exam  03/06/2021     Basic Metabolic Panel  04/20/2021     Cholesterol Lab  04/20/2021     Kidney Microalbumin Urine Test  04/20/2021     Preventive Health Recommendations    See your health care provider every year to    Review health changes.     Discuss preventive care.      Review your medicines if your doctor has prescribed any.    You no longer need a yearly Pap test unless you've had an abnormal Pap test in the past 10 years. If you have vaginal symptoms, such as bleeding or discharge, be sure to talk with your provider about a Pap test.    Every 1 to 2 years, have a mammogram.  If you are over 69, talk with your health care provider about whether or not you want to continue having screening mammograms.    Every 10 years, have a colonoscopy. Or, have a yearly FIT test (stool test). These exams will check for colon cancer.     Have a cholesterol test every 5 years, or more often if your doctor advises it.     Have a diabetes test (fasting glucose) every three years. If you are at risk for diabetes, you should have this test more often.     At age 65, have a bone density scan (DEXA) to check for osteoporosis (brittle bone disease).    Shots:    Get a flu shot each year.    Get a tetanus shot every 10 years.    Talk to your doctor about your  pneumonia vaccines. There are now two you should receive - Pneumovax (PPSV 23) and Prevnar (PCV 13).    Talk to your pharmacist about the shingles vaccine.    Talk to your doctor about the hepatitis B vaccine.    Nutrition:     Eat at least 5 servings of fruits and vegetables each day.    Eat whole-grain bread, whole-wheat pasta and brown rice instead of white grains and rice.    Get adequate Calcium and Vitamin D.     Lifestyle    Exercise at least 150 minutes a week (30 minutes a day, 5 days a week). This will help you control your weight and prevent disease.    Limit alcohol to one drink per day.    No smoking.     Wear sunscreen to prevent skin cancer.     See your dentist twice a year for an exam and cleaning.    See your eye doctor every 1 to 2 years to screen for conditions such as glaucoma, macular degeneration and cataracts.    Personalized Prevention Plan  You are due for the preventive services outlined below.  Your care team is available to assist you in scheduling these services.  If you have already completed any of these items, please share that information with your care team to update in your medical record.  Health Maintenance   Topic Date Due     DIABETIC FOOT EXAM  Never done     HEPATITIS C SCREENING  Never done     DTAP/TDAP/TD IMMUNIZATION (1 - Tdap) Never done     ZOSTER IMMUNIZATION (1 of 2) Never done     Pneumococcal Vaccine: 65+ Years (1 of 2 - PPSV23) 12/05/2019     A1C  10/20/2020     FALL RISK ASSESSMENT  01/02/2021     EYE EXAM  03/06/2021     BMP  04/20/2021     LIPID  04/20/2021     MICROALBUMIN  04/20/2021     INFLUENZA VACCINE (1) 09/01/2021     MAMMO SCREENING  11/07/2021     MEDICARE ANNUAL WELLNESS VISIT  07/22/2022     COLORECTAL CANCER SCREENING  02/07/2025     ADVANCE CARE PLANNING  07/22/2026     DEXA  01/24/2035     PHQ-2  Completed     COVID-19 Vaccine  Completed     IPV IMMUNIZATION  Aged Out     MENINGITIS IMMUNIZATION  Aged Out         Immunization History    Administered Date(s) Administered     COVID-19,PF,Moderna 02/28/2021, 03/28/2021     Flu 65+ Years 11/30/2018     Flu, Unspecified 11/21/2017     Influenza (High Dose) 3 valent vaccine 10/10/2019     Influenza (IIV3) PF 10/03/2016     Influenza Vaccine IM > 6 months Valent IIV4 12/12/2016     Pneumo Conj 13-V (2010&after) 10/10/2019      -- Consider Annual Flu / Influenza vaccination - Every Fall (Starting about October 1st)    -- Pneumococcal PCV 23 shot today.     -- Get your tetanus shot updated - from one of the local pharmacies, at your convenience.     -- Get the new shingles shot (2 in series) (Shingrix) - from one of the local pharmacies, at your convenience. -- Check cost/coverage.   -- or -- If these are very expensive, go to the Local Public Health Department     Blood pressure is well controlled.   Diabetes has been well controlled.   Medications refilled.   Labs are pending.     Start trial of Zyrtec -- for allergies.

## 2021-07-22 NOTE — PROGRESS NOTES
"Medication Reconciliation: complete     FOOD SECURITY SCREENING QUESTIONS  Hunger Vital Signs:  Within the past 12 months we worried whether our food would run out before we got money to buy more. Never  Within the past 12 months the food we bought just didn't last and we didn't have money to get more. Never  Kya Rai LPN .............7/22/2021  11:22 AM    SUBJECTIVE:   Margie Mccoy is a 65 year old female who presents for Preventive Visit.  Patient has been advised of split billing requirements and indicates understanding: Yes  Are you in the first 12 months of your Medicare Part B coverage?  No    Physical Health:    In general, how would you rate your overall physical health? good    Outside of work, how many days during the week do you exercise? 4-5 days/week    Outside of work, approximately how many minutes a day do you exercise?45-60 minutes    If you drink alcohol do you typically have >3 drinks per day or >7 drinks per week? Not Applicable    Do you usually eat at least 4 servings of fruit and vegetables a day, include whole grains & fiber and avoid regularly eating high fat or \"junk\" foods? NO    Do you have any problems taking medications regularly?  No    Do you have any side effects from medications? keara    Needs assistance for the following daily activities: no assistance needed    Which of the following safety concerns are present in your home?  none identified     Hearing impairment: No    In the past 6 months, have you been bothered by leaking of urine? yes    Mental Health:    In general, how would you rate your overall mental or emotional health? good  PHQ-2 Score: 0    Do you feel safe in your environment? Yes    Have you ever done Advance Care Planning? (For example, a Health Directive, POLST, or a discussion with a medical provider or your loved ones about your wishes): No, advance care planning information given to patient to review.  Patient plans to discuss their wishes " Pre-Surgery Instructions:   Medication Instructions    acetaminophen (TYLENOL) 325 mg tablet Instructed patient per Anesthesia Guidelines   medroxyPROGESTERone (PROVERA) 10 mg tablet Instructed patient per Anesthesia Guidelines  Acetaminophen Med Class   Continue to take this medication on your normal schedule  If this is an oral medication and you take it in the morning, then you may take this medicine with a sip of water  Reviewed with Pt medication insts, showering insts, NPO at [place] advised of [facility] call with final DOS insts  Contact info given if questions arose Pt verbalized understanding to all of the above  with loved ones or provider.      Additional concerns to address?  No    Fall risk:  Fallen 2 or more times in the past year?: No  Any fall with injury in the past year?: No    Cognitive Screenin) Repeat 3 items (Leader, Season, Table)    2) Clock draw: NORMAL  3) 3 item recall: Recalls 2 objects   Results: NORMAL clock, 1-2 items recalled: COGNITIVE IMPAIRMENT LESS LIKELY    Mini-CogTM Copyright LETI Mijares. Licensed by the author for use in [facility]; reprinted with permission (katy@[facility]). All rights reserved.      Do you have sleep apnea, excessive snoring or daytime drowsiness?: no    Reviewed and updated as needed this visit by clinical staff  Tobacco  Allergies  Meds  Problems  Med Hx  Surg Hx  Fam Hx  Soc Hx          Reviewed and updated as needed this visit by Provider  Tobacco  Allergies  Meds  Problems  Med Hx  Surg Hx  Fam Hx         Social History     Tobacco Use     Smoking status: Never Smoker     Smokeless tobacco: Never Used   Substance Use Topics     Alcohol use: Yes     Comment: 2 drinks per month                          Current providers sharing in care for this patient include:   Patient Care Team:  Alexander Bentley MD as PCP - General (Internal Medicine)  Dennys Hernandez MD as Assigned Musculoskeletal Provider  Sravani Carrillo APRN CNP as Assigned PCP    The following health maintenance items are reviewed in Epic and correct as of today:  Health Maintenance   Topic Date Due     DTAP/TDAP/TD IMMUNIZATION (1 - Tdap) Never done     ZOSTER IMMUNIZATION (1 of 2) Never done     FALL RISK ASSESSMENT  2021     EYE EXAM  2021     INFLUENZA VACCINE (1) 2021     MAMMO SCREENING  2021     A1C  2022     MEDICARE ANNUAL WELLNESS VISIT  2022     BMP  2022     LIPID  2022     MICROALBUMIN  2022     DIABETIC FOOT EXAM  2022     COLORECTAL CANCER SCREENING  2025     ADVANCE CARE PLANNING  2026     DEXA   "01/24/2035     PHQ-2  Completed     Pneumococcal Vaccine: 65+ Years  Completed     COVID-19 Vaccine  Completed     HEPATITIS C SCREENING  Addressed     IPV IMMUNIZATION  Aged Out     MENINGITIS IMMUNIZATION  Aged Out     Review of Systems   Constitutional: Negative for chills and fever.        + Eating a lot of carbs   HENT: Negative for congestion and hearing loss.    Eyes: Negative for visual disturbance.   Respiratory: Negative for cough, shortness of breath and wheezing.    Cardiovascular: Negative for chest pain and palpitations.   Gastrointestinal: Negative for abdominal pain, diarrhea, nausea and vomiting.   Endocrine: Negative for cold intolerance and heat intolerance.   Genitourinary: Negative for dysuria and hematuria.   Musculoskeletal: Negative for arthralgias and myalgias.   Skin: Negative for rash and wound.   Allergic/Immunologic: Positive for environmental allergies (+ multiple bug bites). Negative for immunocompromised state.   Neurological: Negative for dizziness and light-headedness.   Hematological: Does not bruise/bleed easily.   Psychiatric/Behavioral: Negative for agitation and confusion.        OBJECTIVE:   /78 (BP Location: Right arm, Patient Position: Sitting, Cuff Size: Adult Regular)   Pulse 64   Temp (!) 96.7  F (35.9  C) (Tympanic)   Resp 16   Ht 1.64 m (5' 4.57\")   Wt 76.9 kg (169 lb 9.6 oz)   BMI 28.60 kg/m   Estimated body mass index is 28.6 kg/m  as calculated from the following:    Height as of this encounter: 1.64 m (5' 4.57\").    Weight as of this encounter: 76.9 kg (169 lb 9.6 oz).    Physical Exam  Constitutional:       General: She is not in acute distress.     Appearance: She is well-developed. She is not diaphoretic.   HENT:      Head: Normocephalic and atraumatic.   Eyes:      General: No scleral icterus.     Conjunctiva/sclera: Conjunctivae normal.   Neck:      Thyroid: No thyromegaly.   Cardiovascular:      Rate and Rhythm: Normal rate and regular rhythm. "   Pulmonary:      Effort: Pulmonary effort is normal. No respiratory distress.      Breath sounds: Normal breath sounds. No wheezing.   Abdominal:      Palpations: Abdomen is soft.      Tenderness: There is no abdominal tenderness.   Musculoskeletal:         General: No tenderness or deformity.      Cervical back: Neck supple.   Lymphadenopathy:      Cervical: No cervical adenopathy.   Skin:     General: Skin is warm and dry.      Findings: No rash.      Comments: Multiple bug bites.     Diabetic Foot Exam:  Findings:   LEFT: normal DP and PT pulses, no trophic changes or ulcerative lesions and normal monofilament exam    RIGHT: normal DP and PT pulses, no trophic changes or ulcerative lesions and normal monofilament exam    Neurological:      Mental Status: She is alert and oriented to person, place, and time. Mental status is at baseline.      Cranial Nerves: No cranial nerve deficit.   Psychiatric:         Mood and Affect: Mood normal.         Behavior: Behavior normal.        Diagnostic Test Results:  Labs reviewed in Epic  Results for orders placed or performed in visit on 07/22/21   Comprehensive metabolic panel     Status: Abnormal   Result Value Ref Range    Sodium 138 134 - 144 mmol/L    Potassium 3.8 3.5 - 5.1 mmol/L    Chloride 102 98 - 107 mmol/L    Carbon Dioxide (CO2) 28 21 - 31 mmol/L    Anion Gap 8 3 - 14 mmol/L    Urea Nitrogen 13 7 - 25 mg/dL    Creatinine 0.85 0.60 - 1.20 mg/dL    Calcium 10.1 8.6 - 10.3 mg/dL    Glucose 117 (H) 70 - 105 mg/dL    Alkaline Phosphatase 81 34 - 104 U/L    AST 39 13 - 39 U/L    ALT 38 7 - 52 U/L    Protein Total 8.6 6.4 - 8.9 g/dL    Albumin 4.4 3.5 - 5.7 g/dL    Bilirubin Total 1.2 (H) 0.3 - 1.0 mg/dL    GFR Estimate 71 >60 mL/min/1.73m2   Lipid Profile     Status: Abnormal   Result Value Ref Range    Cholesterol 196 <200 mg/dL    Triglycerides 129 <150 mg/dL    Direct Measure HDL 53 23 - 92 mg/dL    LDL Cholesterol Calculated 117 (H) <=100 mg/dL    Non HDL  Cholesterol 143 (H) <130 mg/dL    Patient Fasting > 8hrs? Yes    Albumin Random Urine Quantitative with Creat Ratio     Status: None   Result Value Ref Range    Creatinine Urine mg/dL 33 mg/dL    Albumin Urine mg/L <5 mg/dL    Albumin Urine mg/g Cr     CBC with platelets     Status: Abnormal   Result Value Ref Range    WBC Count 6.3 4.0 - 11.0 10e3/uL    RBC Count 5.22 (H) 3.80 - 5.20 10e6/uL    Hemoglobin 15.6 11.7 - 15.7 g/dL    Hematocrit 45.9 35.0 - 47.0 %    MCV 88 78 - 100 fL    MCH 29.9 26.5 - 33.0 pg    MCHC 34.0 31.5 - 36.5 g/dL    RDW 12.9 10.0 - 15.0 %    Platelet Count 211 150 - 450 10e3/uL   Hemoglobin A1c     Status: Abnormal   Result Value Ref Range    Hemoglobin A1C 7.0 (H) 4.0 - 6.2 %   TSH with free T4 reflex     Status: Normal   Result Value Ref Range    TSH 1.95 0.40 - 4.00 mU/L   UA reflex to Microscopic     Status: Normal   Result Value Ref Range    Color Urine Yellow Colorless, Straw, Light Yellow, Yellow    Appearance Urine Clear Clear    Glucose Urine Negative Negative mg/dL    Bilirubin Urine Negative Negative    Ketones Urine Negative Negative mg/dL    Specific Gravity Urine 1.006 1.000 - 1.030    Blood Urine Negative Negative    pH Urine 5.0 5.0 - 9.0    Protein Albumin Urine Negative Negative mg/dL    Urobilinogen Urine Normal Normal, 2.0 mg/dL    Nitrite Urine Negative Negative    Leukocyte Esterase Urine Negative Negative    Narrative    Microscopic not indicated        ASSESSMENT / PLAN:       ICD-10-CM    1. Controlled type 2 diabetes mellitus without complication, without long-term current use of insulin (H)  E11.9 UA reflex to Microscopic     TSH with free T4 reflex     Hemoglobin A1c     CBC with platelets     Albumin Random Urine Quantitative with Creat Ratio     Comprehensive metabolic panel     metFORMIN (GLUCOPHAGE-XR) 500 MG 24 hr tablet     aspirin (ASA) 81 MG EC tablet     rosuvastatin (CRESTOR) 5 MG tablet     Comprehensive metabolic panel     Albumin Random Urine  Quantitative with Creat Ratio     CBC with platelets     Hemoglobin A1c     TSH with free T4 reflex     UA reflex to Microscopic   2. Mixed hyperlipidemia  E78.2 Lipid Profile     rosuvastatin (CRESTOR) 5 MG tablet     Lipid Profile   3. Rosacea  L71.9 doxycycline monohydrate (MONODOX) 100 MG capsule   4. Encounter for Medicare annual wellness exam  Z00.00    5. Benign essential hypertension  I10 metoprolol succinate ER (TOPROL-XL) 25 MG 24 hr tablet     triamterene-HCTZ (DYAZIDE) 37.5-25 MG capsule   6. Seasonal allergic rhinitis, unspecified trigger  J30.2 betamethasone dipropionate (DIPROSONE) 0.05 % external cream     cetirizine (ZYRTEC) 10 MG tablet   7. Need for 23-polyvalent pneumococcal polysaccharide vaccine  Z23 GH IMM-  PNEUMOCOCCAL VACCINE,ADULT,SQ OR IM   8. Vaccine counseling  Z71.89    Patient presents for Medicare annual wellness visit as well as follow-up multiple issues.  She has not had diabetic labs checked as far almost a year.    Type 2 diabetes, blood sugars at home are checked intermittently and have been doing well.  Denies hypoglycemia.  Continues only with Metformin.  Typically 3 tablets daily or 1500 mg.  Needs medication refills, labs, and supplies refilled.  No long-term complications are known.  Denies neuropathy.    Mixed hyperlipidemia, currently diet controlled.  Not currently on statin therapy.  Her LDL is over 100.  Discussed importance of reducing this is much as possible.  She has not greatly excited about the idea of statins but is willing to try it once weekly 5 mg Crestor.  Prescription sent to pharmacy.  Consider lab recheck in about 3 months.    Hypertension, blood pressure is well controlled.  Denies syncope or presyncope.  Doing well with her medications.  Denies side effects.  Needs refills of metoprolol and Dyazide.    Allergic rhinitis, chronic.  Quite seasonal.  Spring, fall, late summer.  Start trial of Zyrtec 10 mg daily.  Needs refills.  She would like some topical  "steroid cream to use on her bug bites.  She has been outdoors quite a bit in the biting flies have really been bothering her.  She gets quite swollen from them.  Topical steroids in the past have been very helpful.  She would like a prescription.    Vaccine counseling completed, pneumococcal 23 vaccination due today.  Orders placed.  -Check with pharmacy for tetanus and shingles vaccine.    She is due for an eye exam, recommend scheduling appointment with the eye care clinic.    Acne rosacea, chronic.  Gets a flareup.  Typically takes doxycycline for 3 to 5 days in the flareup improved/resolved.  She would like to refill doxycycline.  Order sent to pharmacy.    Patient has been advised of split billing requirements and indicates understanding: Yes    COUNSELING:  Reviewed preventive health counseling, as reflected in patient instructions  Special attention given to:       Regular exercise       Healthy diet/nutrition       Vision screening       Hearing screening       Dental care       Bladder control       Fall risk prevention       Immunizations       Aspirin prophylaxis     Estimated body mass index is 28.6 kg/m  as calculated from the following:    Height as of this encounter: 1.64 m (5' 4.57\").    Weight as of this encounter: 76.9 kg (169 lb 9.6 oz).        She reports that she has never smoked. She has never used smokeless tobacco.    Appropriate preventive services were discussed with this patient, including applicable screening as appropriate for cardiovascular disease, diabetes, osteopenia/osteoporosis, and glaucoma.  As appropriate for age/gender, discussed screening for colorectal cancer, prostate cancer, breast cancer, and cervical cancer. Checklist reviewing preventive services available has been given to the patient.    Reviewed patients plan of care and provided an AVS. The Complex Care Plan (for patients with higher acuity and needing more deliberate coordination of services) for Margie meets the " Care Plan requirement. This Care Plan has been established and reviewed with the Patient.    Counseling Resources:  ATP IV Guidelines  Pooled Cohorts Equation Calculator  Breast Cancer Risk Calculator  BRCA-Related Cancer Risk Assessment: FHS-7 Tool  FRAX Risk Assessment  ICSI Preventive Guidelines  Dietary Guidelines for Americans, 2010  USDA's MyPlate  ASA Prophylaxis  Lung CA Screening    Alexander Bentley MD  [facility]

## 2021-07-22 NOTE — LETTER
July 23, 2021      Margie Mccoy  [address]  [place] [address]        Dear ,    We are writing to inform you of your test results.    Diabetes is well controlled.    Labs are stable.   Continue current medications.   Plan to recheck labs again in 3 to 4 months.     Electronically signed by:  Alexander Bentley MD  on July 23, 2021     Resulted Orders   Comprehensive metabolic panel   Result Value Ref Range    Sodium 138 134 - 144 mmol/L    Potassium 3.8 3.5 - 5.1 mmol/L    Chloride 102 98 - 107 mmol/L    Carbon Dioxide (CO2) 28 21 - 31 mmol/L    Anion Gap 8 3 - 14 mmol/L    Urea Nitrogen 13 7 - 25 mg/dL    Creatinine 0.85 0.60 - 1.20 mg/dL    Calcium 10.1 8.6 - 10.3 mg/dL    Glucose 117 (H) 70 - 105 mg/dL    Alkaline Phosphatase 81 34 - 104 U/L    AST 39 13 - 39 U/L    ALT 38 7 - 52 U/L    Protein Total 8.6 6.4 - 8.9 g/dL    Albumin 4.4 3.5 - 5.7 g/dL    Bilirubin Total 1.2 (H) 0.3 - 1.0 mg/dL    GFR Estimate 71 >60 mL/min/1.73m2      Comment:      As of July 11, 2021, eGFR is calculated by the CKD-EPI creatinine equation, without race adjustment. eGFR can be influenced by muscle mass, exercise, and diet. The reported eGFR is an estimation only and is only applicable if the renal function is stable.   Lipid Profile   Result Value Ref Range    Cholesterol 196 <200 mg/dL      Comment:      Age 0-19 years  Desirable: <170 mg/dL  Borderline high:  170-199 mg/dl  High:            >199 mg/dl    Age 20 years and older  Desirable: <200 mg/dL    Triglycerides 129 <150 mg/dL      Comment:      0-9 years:  Normal:    Less than 75 mg/dL  Borderline high:  75-99 mg/dL  High:             Greater than or equal to 100 mg/dL    0-19 years:  Normal:    Less than 90 mg/dL  Borderline high:   mg/dL  High:             Greater than or equal to 130 mg/dL    20 years and older:  Normal:    Less than 150 mg/dL  Borderline high:  150-199 mg/dL  High:             200-499 mg/dL  Very high:   Greater than or equal to 500  mg/dL    Direct Measure HDL 53 23 - 92 mg/dL      Comment:      0-19 years:       Greater than or equal to 45 mg/dL   Low: Less than 40 mg/dL   Borderline low: 40-44 mg/dL     20 years and older:   Female: Greater than or equal to 50 mg/dL   Male:   Greater than or equal to 40 mg/dL         LDL Cholesterol Calculated 117 (H) <=100 mg/dL      Comment:      Age 0-19 years:  Desirable: 0-110 mg/dL   Borderline high: 110-129 mg/dL   High: >= 130 mg/dL    Age 20 years and older:  Desirable: <100mg/dL  Above desirable: 100-129 mg/dL   Borderline high: 130-159 mg/dL   High: 160-189 mg/dL   Very high: >= 190 mg/dL    Non HDL Cholesterol 143 (H) <130 mg/dL      Comment:      0-19 years:  Desirable:          Less than 120 mg/dL  Borderline high:   120-144 mg/dL  High:                   Greater than or equal to 145 mg/dL    20 years and older:  Desirable:          130 mg/dL  Above Desirable: 130-159 mg/dL  Borderline high:   160-189 mg/dL  High:               190-219 mg/dL  Very high:     Greater than or equal to 220 mg/dL    Patient Fasting > 8hrs? Yes    Albumin Random Urine Quantitative with Creat Ratio   Result Value Ref Range    Creatinine Urine mg/dL 33 mg/dL    Albumin Urine mg/L <5 mg/dL    Albumin Urine mg/g Cr        Comment:      Unable to calculate:  Urine creatinine or albumin value below detectable level   CBC with platelets   Result Value Ref Range    WBC Count 6.3 4.0 - 11.0 10e3/uL    RBC Count 5.22 (H) 3.80 - 5.20 10e6/uL    Hemoglobin 15.6 11.7 - 15.7 g/dL    Hematocrit 45.9 35.0 - 47.0 %    MCV 88 78 - 100 fL    MCH 29.9 26.5 - 33.0 pg    MCHC 34.0 31.5 - 36.5 g/dL    RDW 12.9 10.0 - 15.0 %    Platelet Count 211 150 - 450 10e3/uL   Hemoglobin A1c   Result Value Ref Range    Hemoglobin A1C 7.0 (H) 4.0 - 6.2 %   TSH with free T4 reflex   Result Value Ref Range    TSH 1.95 0.40 - 4.00 mU/L   UA reflex to Microscopic   Result Value Ref Range    Color Urine Yellow Colorless, Straw, Light Yellow, Yellow     Appearance Urine Clear Clear    Glucose Urine Negative Negative mg/dL    Bilirubin Urine Negative Negative    Ketones Urine Negative Negative mg/dL    Specific Gravity Urine 1.006 1.000 - 1.030    Blood Urine Negative Negative    pH Urine 5.0 5.0 - 9.0    Protein Albumin Urine Negative Negative mg/dL    Urobilinogen Urine Normal Normal, 2.0 mg/dL    Nitrite Urine Negative Negative    Leukocyte Esterase Urine Negative Negative    Narrative    Microscopic not indicated       If you have any questions or concerns, please call the clinic at the number listed above.       Sincerely,      Alexander Bentley MD

## 2021-10-09 ENCOUNTER — HEALTH MAINTENANCE LETTER (OUTPATIENT)
Age: 66
End: 2021-10-09

## 2022-01-29 ENCOUNTER — HEALTH MAINTENANCE LETTER (OUTPATIENT)
Age: 67
End: 2022-01-29

## 2022-06-21 ENCOUNTER — TRANSFERRED RECORDS (OUTPATIENT)
Dept: HEALTH INFORMATION MANAGEMENT | Facility: OTHER | Age: 67
End: 2022-06-21

## 2022-06-21 LAB — RETINOPATHY: NEGATIVE

## 2022-07-22 DIAGNOSIS — J30.2 SEASONAL ALLERGIC RHINITIS, UNSPECIFIED TRIGGER: ICD-10-CM

## 2022-07-22 NOTE — LETTER
July 25, 2022      Margie Mccoy  [address]  [place] [address]        This letter is to remind you that you are due for your annual exam with Alexander Bentley. Your last comprehensive visit was more than 12 months ago.    Additional refills require you to complete this appointment.    Please call the clinic at [phone] to schedule your appointment.    If you should require additional refills before your scheduled appointment, please contact your pharmacy and we will refill your medication until the date of your appointment.    If you are no longer seeing Alexander Bentley for primary care, please call to let us know.       Thank you for choosing [facility] and [facility] for your health care needs.    Sincerely,    Refill RN  [facility]

## 2022-07-25 ENCOUNTER — MYC MEDICAL ADVICE (OUTPATIENT)
Dept: INTERNAL MEDICINE | Facility: OTHER | Age: 67
End: 2022-07-25

## 2022-07-25 NOTE — TELEPHONE ENCOUNTER
Routing refill request to provider for review/approval because:    LOV: 7/22/21    Patient is due for annual review.  Letter and my chart message sent.    Will route to outreach to call patient to help assist in scheduling an appointment.    Екатерина Shen RN on 7/25/2022 at 11:13 AM

## 2022-07-27 NOTE — TELEPHONE ENCOUNTER
Overdue for Annual Medicare wellness visit / physical.     Please call patient and schedule.  Okay to use 4 PM -- 40 minute spot at the end of the day if needed.    We can send in a small Prescription refill if needed to get to appointment.     Alexander Bentley MD

## 2022-07-28 DIAGNOSIS — J30.2 SEASONAL ALLERGIC RHINITIS, UNSPECIFIED TRIGGER: ICD-10-CM

## 2022-07-28 RX ORDER — BETAMETHASONE DIPROPIONATE 0.5 MG/G
CREAM TOPICAL 2 TIMES DAILY
Qty: 45 G | Refills: 3 | OUTPATIENT
Start: 2022-07-28

## 2022-07-28 NOTE — TELEPHONE ENCOUNTER
[facility] sent Rx request for the following:      Requested Prescriptions   Pending Prescriptions Disp Refills     betamethasone dipropionate (DIPROSONE) 0.05 % external cream [Pharmacy Med Name: BETAMETHASONE DIP CR 0.05% 0.05 Cream] 45 g 3     Sig: APPLY TOPICALLY 2 TIMES DAILY   Last Prescription Date:   7/22/21  Last Fill Qty/Refills:         45 g, R-3    Last Office Visit:              7/22/21   Future Office visit:           None    Please see refill encounter, dated 7/22/22.    Renetta Fernandes RN .............. 7/28/2022  12:26 PM

## 2022-08-03 ENCOUNTER — TELEPHONE (OUTPATIENT)
Dept: INTERNAL MEDICINE | Facility: OTHER | Age: 67
End: 2022-08-03

## 2022-08-03 NOTE — TELEPHONE ENCOUNTER
Please schedule lab only appointment.     Electronically signed by:  Alexander Bentley MD  on August 3, 2022 10:36 AM

## 2022-08-03 NOTE — TELEPHONE ENCOUNTER
Has physical on 10/12, would like to do labs prior to appt, please order and let pt know when placed so she can schedule, thank you!      Shannon Hernadez on 8/3/2022 at 8:32 AM

## 2022-08-04 RX ORDER — BETAMETHASONE DIPROPIONATE 0.5 MG/G
CREAM TOPICAL 2 TIMES DAILY
Qty: 45 G | Refills: 1 | Status: SHIPPED | OUTPATIENT
Start: 2022-08-04

## 2022-09-17 ENCOUNTER — HEALTH MAINTENANCE LETTER (OUTPATIENT)
Age: 67
End: 2022-09-17

## 2022-10-12 ENCOUNTER — TELEPHONE (OUTPATIENT)
Dept: INTERNAL MEDICINE | Facility: OTHER | Age: 67
End: 2022-10-12

## 2022-10-12 ENCOUNTER — OFFICE VISIT (OUTPATIENT)
Dept: INTERNAL MEDICINE | Facility: OTHER | Age: 67
End: 2022-10-12
Attending: INTERNAL MEDICINE
Payer: COMMERCIAL

## 2022-10-12 VITALS
SYSTOLIC BLOOD PRESSURE: 134 MMHG | HEIGHT: 64 IN | WEIGHT: 179.4 LBS | RESPIRATION RATE: 20 BRPM | DIASTOLIC BLOOD PRESSURE: 74 MMHG | OXYGEN SATURATION: 97 % | HEART RATE: 67 BPM | TEMPERATURE: 98.4 F | BODY MASS INDEX: 30.63 KG/M2

## 2022-10-12 DIAGNOSIS — R60.0 BILATERAL LOWER EXTREMITY EDEMA: ICD-10-CM

## 2022-10-12 DIAGNOSIS — E11.22 CONTROLLED TYPE 2 DIABETES MELLITUS WITH STAGE 2 CHRONIC KIDNEY DISEASE, WITHOUT LONG-TERM CURRENT USE OF INSULIN (H): Primary | ICD-10-CM

## 2022-10-12 DIAGNOSIS — E66.01 MORBID OBESITY (H): ICD-10-CM

## 2022-10-12 DIAGNOSIS — Z12.31 VISIT FOR SCREENING MAMMOGRAM: ICD-10-CM

## 2022-10-12 DIAGNOSIS — J30.2 SEASONAL ALLERGIC RHINITIS, UNSPECIFIED TRIGGER: ICD-10-CM

## 2022-10-12 DIAGNOSIS — Z00.00 ENCOUNTER FOR MEDICARE ANNUAL WELLNESS EXAM: ICD-10-CM

## 2022-10-12 DIAGNOSIS — I10 BENIGN ESSENTIAL HYPERTENSION: ICD-10-CM

## 2022-10-12 DIAGNOSIS — L71.9 ROSACEA: ICD-10-CM

## 2022-10-12 DIAGNOSIS — N18.2 CONTROLLED TYPE 2 DIABETES MELLITUS WITH STAGE 2 CHRONIC KIDNEY DISEASE, WITHOUT LONG-TERM CURRENT USE OF INSULIN (H): Primary | ICD-10-CM

## 2022-10-12 DIAGNOSIS — Z23 NEED FOR COVID-19 VACCINE: ICD-10-CM

## 2022-10-12 DIAGNOSIS — Z71.85 VACCINE COUNSELING: ICD-10-CM

## 2022-10-12 DIAGNOSIS — Z23 NEEDS FLU SHOT: ICD-10-CM

## 2022-10-12 DIAGNOSIS — E78.2 MIXED HYPERLIPIDEMIA: ICD-10-CM

## 2022-10-12 LAB
ALBUMIN SERPL-MCNC: 3.9 G/DL (ref 3.5–5.7)
ALP SERPL-CCNC: 82 U/L (ref 34–104)
ALT SERPL W P-5'-P-CCNC: 35 U/L (ref 7–52)
ANION GAP SERPL CALCULATED.3IONS-SCNC: 7 MMOL/L (ref 3–14)
AST SERPL W P-5'-P-CCNC: 31 U/L (ref 13–39)
BILIRUB SERPL-MCNC: 1 MG/DL (ref 0.3–1)
BUN SERPL-MCNC: 13 MG/DL (ref 7–25)
CALCIUM SERPL-MCNC: 9.4 MG/DL (ref 8.6–10.3)
CHLORIDE BLD-SCNC: 105 MMOL/L (ref 98–107)
CHOLEST SERPL-MCNC: 157 MG/DL
CO2 SERPL-SCNC: 26 MMOL/L (ref 21–31)
CREAT SERPL-MCNC: 0.81 MG/DL (ref 0.6–1.2)
ERYTHROCYTE [DISTWIDTH] IN BLOOD BY AUTOMATED COUNT: 12.4 % (ref 10–15)
FASTING STATUS PATIENT QL REPORTED: NORMAL
GFR SERPL CREATININE-BSD FRML MDRD: 78 ML/MIN/1.73M2
GLUCOSE BLD-MCNC: 128 MG/DL (ref 70–105)
HBA1C MFR BLD: 7.8 % (ref 4–6.2)
HCT VFR BLD AUTO: 41.6 % (ref 35–47)
HDLC SERPL-MCNC: 51 MG/DL (ref 23–92)
HGB BLD-MCNC: 14.4 G/DL (ref 11.7–15.7)
LDLC SERPL CALC-MCNC: 90 MG/DL
MCH RBC QN AUTO: 30.3 PG (ref 26.5–33)
MCHC RBC AUTO-ENTMCNC: 34.6 G/DL (ref 31.5–36.5)
MCV RBC AUTO: 88 FL (ref 78–100)
NONHDLC SERPL-MCNC: 106 MG/DL
PLATELET # BLD AUTO: 210 10E3/UL (ref 150–450)
POTASSIUM BLD-SCNC: 3.9 MMOL/L (ref 3.5–5.1)
PROT SERPL-MCNC: 7.9 G/DL (ref 6.4–8.9)
RBC # BLD AUTO: 4.75 10E6/UL (ref 3.8–5.2)
SODIUM SERPL-SCNC: 138 MMOL/L (ref 134–144)
TRIGL SERPL-MCNC: 81 MG/DL
TSH SERPL DL<=0.005 MIU/L-ACNC: 1.41 MU/L (ref 0.4–4)
WBC # BLD AUTO: 5.7 10E3/UL (ref 4–11)

## 2022-10-12 PROCEDURE — G0439 PPPS, SUBSEQ VISIT: HCPCS | Performed by: INTERNAL MEDICINE

## 2022-10-12 PROCEDURE — 85027 COMPLETE CBC AUTOMATED: CPT | Mod: ZL | Performed by: INTERNAL MEDICINE

## 2022-10-12 PROCEDURE — 80061 LIPID PANEL: CPT | Mod: ZL | Performed by: INTERNAL MEDICINE

## 2022-10-12 PROCEDURE — 80053 COMPREHEN METABOLIC PANEL: CPT | Mod: ZL | Performed by: INTERNAL MEDICINE

## 2022-10-12 PROCEDURE — 91312 COVID-19,PF,PFIZER BOOSTER BIVALENT: CPT

## 2022-10-12 PROCEDURE — 84443 ASSAY THYROID STIM HORMONE: CPT | Mod: ZL | Performed by: INTERNAL MEDICINE

## 2022-10-12 PROCEDURE — G0008 ADMIN INFLUENZA VIRUS VAC: HCPCS

## 2022-10-12 PROCEDURE — G0463 HOSPITAL OUTPT CLINIC VISIT: HCPCS | Mod: 25

## 2022-10-12 PROCEDURE — 36415 COLL VENOUS BLD VENIPUNCTURE: CPT | Mod: ZL | Performed by: INTERNAL MEDICINE

## 2022-10-12 PROCEDURE — 83036 HEMOGLOBIN GLYCOSYLATED A1C: CPT | Mod: ZL | Performed by: INTERNAL MEDICINE

## 2022-10-12 PROCEDURE — 99214 OFFICE O/P EST MOD 30 MIN: CPT | Mod: 25 | Performed by: INTERNAL MEDICINE

## 2022-10-12 RX ORDER — DOXYCYCLINE 100 MG/1
100 CAPSULE ORAL DAILY PRN
Qty: 90 CAPSULE | Refills: 1 | Status: SHIPPED | OUTPATIENT
Start: 2022-10-12 | End: 2023-05-12

## 2022-10-12 RX ORDER — METFORMIN HCL 500 MG
1500 TABLET, EXTENDED RELEASE 24 HR ORAL
Qty: 360 TABLET | Refills: 4 | Status: SHIPPED | OUTPATIENT
Start: 2022-10-12 | End: 2023-01-17

## 2022-10-12 RX ORDER — CETIRIZINE HYDROCHLORIDE 10 MG/1
10 TABLET ORAL DAILY PRN
Qty: 90 TABLET | Refills: 1 | Status: SHIPPED | OUTPATIENT
Start: 2022-10-12 | End: 2023-01-17

## 2022-10-12 RX ORDER — METOPROLOL SUCCINATE 50 MG/1
50 TABLET, EXTENDED RELEASE ORAL EVERY EVENING
Qty: 90 TABLET | Refills: 1 | Status: SHIPPED | OUTPATIENT
Start: 2022-10-12 | End: 2023-01-17

## 2022-10-12 RX ORDER — BLOOD-GLUCOSE METER
EACH MISCELLANEOUS
Qty: 1 KIT | Refills: 11 | Status: SHIPPED | OUTPATIENT
Start: 2022-10-12

## 2022-10-12 RX ORDER — ROSUVASTATIN CALCIUM 5 MG/1
5 TABLET, COATED ORAL WEEKLY
Qty: 15 TABLET | Refills: 4 | Status: SHIPPED | OUTPATIENT
Start: 2022-10-12 | End: 2023-05-12

## 2022-10-12 ASSESSMENT — ENCOUNTER SYMPTOMS
BREAST MASS: 0
SHORTNESS OF BREATH: 0
CHILLS: 0
HEARTBURN: 1
HEMATOCHEZIA: 0
HEMATURIA: 0
PALPITATIONS: 1
ARTHRALGIAS: 0
ABDOMINAL PAIN: 0
WHEEZING: 0
CONSTIPATION: 0
NAUSEA: 0
WOUND: 0
LIGHT-HEADEDNESS: 0
DYSURIA: 0
VOMITING: 0
CONFUSION: 0
DIZZINESS: 0
FEVER: 0
DIARRHEA: 0
AGITATION: 0
BRUISES/BLEEDS EASILY: 0
COUGH: 0
MYALGIAS: 0

## 2022-10-12 ASSESSMENT — ACTIVITIES OF DAILY LIVING (ADL): CURRENT_FUNCTION: NO ASSISTANCE NEEDED

## 2022-10-12 ASSESSMENT — PAIN SCALES - GENERAL: PAINLEVEL: NO PAIN (0)

## 2022-10-12 NOTE — TELEPHONE ENCOUNTER
Prescription for 3 months of blood glucose test strips entered per Dr. Bentley request/CPA on prescription for glucose meter.  No further action required.    Awilda Colvin, PharmD  [facility]

## 2022-10-12 NOTE — NURSING NOTE
"Chief Complaint   Patient presents with     Medicare Wellness Visit         Initial /74 (BP Location: Right arm, Patient Position: Right side, Cuff Size: Adult Regular)   Pulse 67   Temp 98.4  F (36.9  C) (Temporal)   Resp 20   Ht 1.632 m (5' 4.25\")   Wt 81.4 kg (179 lb 6.4 oz)   SpO2 97%   BMI 30.55 kg/m   Estimated body mass index is 30.55 kg/m  as calculated from the following:    Height as of this encounter: 1.632 m (5' 4.25\").    Weight as of this encounter: 81.4 kg (179 lb 6.4 oz).       FOOD SECURITY SCREENING QUESTIONS:    The next two questions are to help us understand your food security.  If you are feeling you need any assistance in this area, we have resources available to support you today.    Hunger Vital Signs:  Within the past 12 months we worried whether our food would run out before we got money to buy more. Never  Within the past 12 months the food we bought just didn't last and we didn't have money to get more. Never    Advance Care Directive on file? no      Medication reconciliation complete.      Livan Pa,on 10/12/2022 at 10:16 AM        "

## 2022-10-12 NOTE — PROGRESS NOTES
"SUBJECTIVE:   Margie is a 67 year old who presents for Preventive Visit.    Patient has been advised of split billing requirements and indicates understanding: Yes  Are you in the first 12 months of your Medicare coverage?  No    Healthy Habits:     In general, how would you rate your overall health?  Good    Frequency of exercise:  4-5 days/week    Duration of exercise:  15-30 minutes    Do you usually eat at least 4 servings of fruit and vegetables a day, include whole grains    & fiber and avoid regularly eating high fat or \"junk\" foods?  No    Taking medications regularly:  Yes    Medication side effects:  Not applicable    Ability to successfully perform activities of daily living:  No assistance needed    Home Safety:  Lighting is poor    Hearing Impairment:  No hearing concerns    In the past 6 months, have you been bothered by leaking of urine? Yes    In general, how would you rate your overall mental or emotional health?  Good      PHQ-2 Total Score: 0    Additional concerns today:  Yes    Do you feel safe in your environment? Yes    Have you ever done Advance Care Planning? (For example, a Health Directive, POLST, or a discussion with a medical provider or your loved ones about your wishes): Yes, advance care planning is on file.     Fall risk  Fallen 2 or more times in the past year?: No  Any fall with injury in the past year?: No    Cognitive Screening   1) Repeat 3 items (Leader, Season, Table)    2) Clock draw: NORMAL  3) 3 item recall: Recalls 3 objects  Results: 3 items recalled: COGNITIVE IMPAIRMENT LESS LIKELY    Mini-CogTM Copyright LETI Mijares. Licensed by the author for use in [facility]; reprinted with permission (katy@.[email or web address]). All rights reserved.      Do you have sleep apnea, excessive snoring or daytime drowsiness?: no  Review current opioid prescriptions    For a patient with a current opioid prescription:    Reviewed potential Opioid Use Disorder (OUD) risk factors: Yes "     Evaluate their pain severity and current treatment plan:     Provide information on non-opioid treatment options:    Refer to a specialist, as appropriate:    Get more information on pain management in the HHS Pain Management Best Practices Inter-agency Task Force Report    Screen for potential Substance Use Disorders (SUDs)    Reviewed the patient s potential risk factors for SUDs: Yes     Refer to treatment or specialist, as appropriate:     A screening tool isn t required but you may use one:  Find more information in the National [place] on Drug Abuse Screening and Assessment Tools Chart    Reviewed and updated as needed this visit by clinical staff   Tobacco  Allergies  Meds  Problems  Med Hx  Surg Hx  Fam Hx  Soc   Hx        Reviewed and updated as needed this visit by Provider   Tobacco  Allergies  Meds  Problems  Med Hx  Surg Hx  Fam Hx         Social History     Tobacco Use     Smoking status: Never     Smokeless tobacco: Never   Substance Use Topics     Alcohol use: Yes     Comment: at the maybe 2 drinks in 2 months     Alcohol Use 10/12/2022   Prescreen: >3 drinks/day or >7 drinks/week? No     Current providers sharing in care for this patient include:   Patient Care Team:  Alexander Bentley MD as PCP - General (Internal Medicine)  Alexander Bentley MD as Assigned PCP    The following health maintenance items are reviewed in Epic and correct as of today:  Health Maintenance   Topic Date Due     DTAP/TDAP/TD IMMUNIZATION (1 - Tdap) Never done     ZOSTER IMMUNIZATION (1 of 2) Never done     MAMMO SCREENING  11/07/2021     MICROALBUMIN  07/22/2022     DIABETIC FOOT EXAM  07/22/2022     A1C  04/12/2023     EYE EXAM  06/21/2023     MEDICARE ANNUAL WELLNESS VISIT  10/12/2023     BMP  10/12/2023     LIPID  10/12/2023     FALL RISK ASSESSMENT  10/12/2023     COLORECTAL CANCER SCREENING  02/07/2025     ADVANCE CARE PLANNING  10/12/2027     DEXA  01/24/2035     PHQ-2 (once per calendar year)   "Completed     INFLUENZA VACCINE  Completed     Pneumococcal Vaccine: 65+ Years  Completed     URINALYSIS  Completed     COVID-19 Vaccine  Completed     HEPATITIS C SCREENING  Addressed     IPV IMMUNIZATION  Aged Out     MENINGITIS IMMUNIZATION  Aged Out     FHS-7: No flowsheet data found.    Mammogram Screening: Recommended mammography every 1-2 years with patient discussion and risk factor consideration  Pertinent mammograms are reviewed under the imaging tab.    Review of Systems   Constitutional: Negative for chills and fever.   HENT: Negative for congestion and hearing loss.    Eyes: Negative for visual disturbance.   Respiratory: Negative for cough, shortness of breath and wheezing.    Cardiovascular: Positive for palpitations. Negative for chest pain.   Gastrointestinal: Positive for heartburn. Negative for abdominal pain, constipation, diarrhea, hematochezia, nausea and vomiting.   Endocrine: Negative for cold intolerance and heat intolerance.   Breasts:  Negative for tenderness, breast mass and discharge.   Genitourinary: Negative for dysuria, hematuria, pelvic pain, vaginal bleeding and vaginal discharge.   Musculoskeletal: Negative for arthralgias and myalgias.   Skin: Negative for rash and wound.   Allergic/Immunologic: Negative for immunocompromised state.   Neurological: Negative for dizziness and light-headedness.   Hematological: Does not bruise/bleed easily.   Psychiatric/Behavioral: Negative for agitation and confusion.     OBJECTIVE:   /74 (BP Location: Right arm, Patient Position: Right side, Cuff Size: Adult Regular)   Pulse 67   Temp 98.4  F (36.9  C) (Temporal)   Resp 20   Ht 1.632 m (5' 4.25\")   Wt 81.4 kg (179 lb 6.4 oz)   SpO2 97%   BMI 30.55 kg/m   Estimated body mass index is 30.55 kg/m  as calculated from the following:    Height as of this encounter: 1.632 m (5' 4.25\").    Weight as of this encounter: 81.4 kg (179 lb 6.4 oz).  Physical Exam  Constitutional:       General: " She is not in acute distress.     Appearance: She is well-developed. She is not diaphoretic.   HENT:      Head: Normocephalic and atraumatic.   Eyes:      General: No scleral icterus.     Conjunctiva/sclera: Conjunctivae normal.   Neck:      Vascular: No carotid bruit.   Cardiovascular:      Rate and Rhythm: Normal rate and regular rhythm.      Pulses: Normal pulses.   Pulmonary:      Effort: Pulmonary effort is normal.      Breath sounds: Normal breath sounds.   Abdominal:      Palpations: Abdomen is soft.      Tenderness: There is no abdominal tenderness.   Musculoskeletal:         General: No deformity.      Cervical back: Neck supple.      Right lower leg: No edema.      Left lower leg: No edema.   Lymphadenopathy:      Cervical: No cervical adenopathy.   Skin:     General: Skin is warm and dry.      Coloration: Skin is not jaundiced.      Findings: No rash.   Neurological:      Mental Status: She is alert and oriented to person, place, and time. Mental status is at baseline.   Psychiatric:         Mood and Affect: Mood normal.         Behavior: Behavior normal.       Diagnostic Test Results:  Labs reviewed in [facility]  Recent Labs   Lab Test 10/12/22  1109 07/22/21  1215 04/20/20  0846   A1C 7.8* 7.0* 6.1*   LDL 90 117* 119*   HDL 51 53 53   TRIG 81 129 98   ALT 35 38 15   CR 0.81 0.85 0.87   GFRESTIMATED 78 71 65   POTASSIUM 3.9 3.8 3.6   TSH 1.41 1.95  --    WBC 5.7 6.3 6.1   HGB 14.4 15.6 15.1    211 235   ALBUMIN 3.9 4.4 4.5   Hemoglobin A1c is controlled but up from baseline.  LDL is high and not at goal, less than 70.  HDL and triglycerides are at goal.  ALT is normal.  Creatinine has improved slightly.  Potassium is normal.  TSH is normal.  CBC is normal.  Albumin is normal.    ASSESSMENT / PLAN:       ICD-10-CM    1. Controlled type 2 diabetes mellitus with stage 2 chronic kidney disease, without long-term current use of insulin (H)  E11.22 aspirin (ASA) 81 MG EC tablet    N18.2 metFORMIN (GLUCOPHAGE  XR) 500 MG 24 hr tablet     UA reflex to Microscopic     TSH with free T4 reflex     Hemoglobin A1c     CBC with platelets     Albumin Random Urine Quantitative with Creat Ratio     Comprehensive metabolic panel     empagliflozin (JARDIANCE) 10 MG TABS tablet     semaglutide (OZEMPIC) 2 MG/1.5ML SOPN pen     blood glucose monitoring (ONE TOUCH ULTRA 2) meter device kit     Comprehensive metabolic panel     CBC with platelets     Hemoglobin A1c     TSH with free T4 reflex     CANCELED: Albumin Random Urine Quantitative with Creat Ratio     CANCELED: UA reflex to Microscopic      2. Benign essential hypertension  I10 metoprolol succinate ER (TOPROL XL) 50 MG 24 hr tablet     empagliflozin (JARDIANCE) 10 MG TABS tablet      3. Mixed hyperlipidemia  E78.2 Lipid Profile     rosuvastatin (CRESTOR) 5 MG tablet     Lipid Profile      4. Rosacea  L71.9 doxycycline monohydrate (MONODOX) 100 MG capsule      5. Seasonal allergic rhinitis, unspecified trigger  J30.2 cetirizine (ZYRTEC) 10 MG tablet      6. Needs flu shot  Z23       7. Need for COVID-19 vaccine  Z23       8. Morbid obesity (H)  E66.01 empagliflozin (JARDIANCE) 10 MG TABS tablet     semaglutide (OZEMPIC) 2 MG/1.5ML SOPN pen      9. Bilateral lower extremity edema  R60.0 empagliflozin (JARDIANCE) 10 MG TABS tablet      10. Visit for screening mammogram  Z12.31 MA Screening Digital Bilateral      11. Vaccine counseling  Z71.85       12. Encounter for Medicare annual wellness exam  Z00.00       Patient presents for Medicare annual wellness visit as well as follow-up multiple issues.    Type 2 diabetes, currently controlled but her hemoglobin A1c has gone up.  She has gained about 9 pounds as well.  Was not able to tolerate 4 tablets of metformin or 2000 mg daily.  Had to drop the dosing down to 3 tablets daily.  Given her rise in blood sugars, hemoglobin A1c, we discussed a few additional treatment options including Jardiance, semaglutide injections.  She would like  to check on cost and coverage of these medications.  --Recommend only starting 1 new medication at a time.  Awaiting a week or 2 and then going ahead with starting the second medication if tolerated.  Standing lab work orders placed today.  Refills for testing supplies also refilled.    Hypertension, has been having more heart palpitation symptoms.  We discussed increasing dose of metoprolol.  She increased from 25 mg up to 37 mg at home on her own.  We will increase this up to 50 mg every evening.  Given that she only takes the triamterene hydrochlorothiazide intermittently when she has some swelling.  We discussed initiating Jardiance instead.  Stop the triamterene hydrochlorothiazide tablet.  Metoprolol dosing increased up to 50 mg daily.    Mixed hyperlipidemia.  Currently taking Crestor once weekly.  Seems to be tolerating well.  Cholesterol levels have improved but are not at goal.  Hopefully with additional medication changes as noted above for her diabetes this will improve her cholesterol levels otherwise we will need to consider additional medication changes.    Acne rosacea, ongoing, gets intermittent flareups.  Takes doxycycline periodically.  Needs refills.    Allergic rhinitis, ongoing.  Takes Zyrtec daily.  This has been helpful.  Needs refills.    Flu shot and COVID vaccinations today.    Obesity, discussed need for reduced oral caloric intake.  Regular exercise.  Reduce carbohydrate intake.  Information printed and reviewed.  -To help with blood sugar and weight loss, Jardiance and Ozempic injection sent to pharmacy.    Lower extremity edema, see above.  Rarely takes triamterene hydrochlorothiazide.  We will stop this for now and start Jardiance instead.    Mammogram is due, orders placed.    Patient has been advised of split billing requirements and indicates understanding: Yes    COUNSELING:  Reviewed preventive health counseling, as reflected in patient instructions  Special attention given to:    "    Regular exercise       Healthy diet/nutrition       Vision screening       Hearing screening       Dental care       Bladder control       Fall risk prevention       Immunizations    Estimated body mass index is 30.55 kg/m  as calculated from the following:    Height as of this encounter: 1.632 m (5' 4.25\").    Weight as of this encounter: 81.4 kg (179 lb 6.4 oz).    Weight management plan: Discussed healthy diet and exercise guidelines    She reports that she has never smoked. She has never used smokeless tobacco.      Appropriate preventive services were discussed with this patient, including applicable screening as appropriate for cardiovascular disease, diabetes, osteopenia/osteoporosis, and glaucoma.  As appropriate for age/gender, discussed screening for colorectal cancer, prostate cancer, breast cancer, and cervical cancer. Checklist reviewing preventive services available has been given to the patient.    Reviewed patients plan of care and provided an AVS. The Complex Care Plan (for patients with higher acuity and needing more deliberate coordination of services) for Margie meets the Care Plan requirement. This Care Plan has been established and reviewed with the Patient.    Counseling Resources:  ATP IV Guidelines  Pooled Cohorts Equation Calculator  Breast Cancer Risk Calculator  Breast Cancer: Medication to Reduce Risk  FRAX Risk Assessment  ICSI Preventive Guidelines  Dietary Guidelines for Americans, 2010  USDA's MyPlate  ASA Prophylaxis  Lung CA Screening    Alexander Bentley MD  [facility]    Identified Health Risks:    The patient was counseled and encouraged to consider modifying their diet and eating habits. She was provided with information on recommended healthy diet options.  Information on urinary incontinence and treatment options given to patient.  "

## 2022-10-12 NOTE — PATIENT INSTRUCTIONS
Immunization History   Administered Date(s) Administered     COVID-19,PF,Moderna 02/28/2021, 03/28/2021, 12/08/2021     COVID-19,PF,Pfizer 12+ YRS BIVALENT Booster 10/12/2022     FLUAD(HD)65+ QUAD 10/25/2021     Flu 65+ Years 11/30/2018     Flu, Unspecified 11/21/2017     Influenza (High Dose) 3 valent vaccine 10/10/2019     Influenza (IIV3) PF 10/03/2016     Influenza Vaccine IM > 6 months Valent IIV4 (Alfuria,Fluzone) 12/12/2016     Influenza, Quad, High Dose, Pf, 65yr+ (Fluzone HD) 10/12/2022     Pneumo Conj 13-V (2010&after) 10/10/2019     Pneumococcal 23 valent 07/22/2021      -- Yearly BIVALENT - COVID-19 Vaccination shot (Omicron 4 and 5/COVID) - Today.     -- Pneumonia / Pneumococcal PCV vaccines are done / completed.     -- Flu shot today.       -- Get your tetanus shot updated - from one of the local pharmacies, at your convenience or the Local Public Health Department.     -- Get the new shingles shot (2 in series) (Shingrix) - from one of the local pharmacies, at your convenience. -- Check cost/coverage.   -- or -- If these are very expensive, go to the Local Public Health Department     [facility] -- [place] [address]    ---- Shot clinic is the FIRST Thursday of Each Month -- from 2 to 6 PM, by Appointment only.     Call for an appointment -- [phone]      Blood pressure is well controlled.   Diabetes has been well controlled.     Medications refilled.   Labs are pending.     Mammogram ordered  - they will call with date/time of appointment.        Start Jardiance and Semaglutide injections... for weight / diabetes.     Aspects of Diabetes:   Recent Labs   Lab Test 07/22/21  1215 04/20/20  0846 01/02/20  1046   A1C 7.0* 6.1* 6.5*   * 119* 112*   HDL 53 53 49   TRIG 129 98 127   ALT 38 15 28   CR 0.85 0.87 0.87   GFRESTIMATED 71 65 65   POTASSIUM 3.8 3.6 3.3*   TSH 1.95  --   --    WBC 6.3 6.1 5.4   HGB 15.6 15.1  15.1    235 220   ALBUMIN 4.4 4.5 4.6      Hemoglobin A1c  Goal range is under 8%. Best is 6.5 to 7   Blood Pressure 134/74 Goal to keep less than 140/90   Tobacco  reports that she has never smoked. She has never used smokeless tobacco. Goal to abstain from tobacco   Aspirin or Plavix Anti-platelet therapy Aspirin or Plavix reduces risk of heart disease and stroke  -- sometimes used with other blood thinners, depending on bleeding risk and risk factors.    ACE/ARB Specific blood pressure meds These medications can reduce risk of kidney disease   Cholesterol Statins (Lipitor, Crestor, vs others) Statins reduce risk of heart disease and stroke   Eye Exam -- Do Yearly -- Annual diabetic eye exam   Healthy weight Wt Readings from Last 4 Encounters:   10/12/22 81.4 kg (179 lb 6.4 oz)   07/22/21 76.9 kg (169 lb 9.6 oz)   02/07/20 72.6 kg (160 lb)   01/02/20 72.8 kg (160 lb 8 oz)      Body mass index is 30.55 kg/m .  Goal BMI under 30, best is under 25.      -- Trying to exercise daily (goal at least 20 min/day) with moderate aerobic activity   -- Eat healthy (resources from ADA at http://www.diabetes.org/)   -- Taking good care of my feet. Consider seeing the Podiatrist   -- Check blood sugars as directed, record in log book and bring to every appointment    Insurance companies are grading you and I on your blood sugar control -- Goal is to get your A1c down to 7.9% or lower and NO Smoking!  -- Medicare and most insurance companies, will only cover Hemoglobin A1c labs to be rechecked every 91+ days.      Return for Diabetes labs and clinic follow-up appointment every 3 to 4 months.    Schedule lab only appointment --- A few days AFTER: 1/10/23   Schedule clinic appointment with Dr. Bentley -- Same day as labs, or 1-2 days later.    Patient Education   Personalized Prevention Plan  You are due for the preventive services outlined below.  Your care team is available to assist you in scheduling these services.  If  you have already completed any of these items, please share that information with your care team to update in your medical record.  Health Maintenance Due   Topic Date Due     Diptheria Tetanus Pertussis (DTAP/TDAP/TD) Vaccine (1 - Tdap) Never done     Zoster (Shingles) Vaccine (1 of 2) Never done     Mammogram  11/07/2021     A1C Lab  01/22/2022     Basic Metabolic Panel  07/22/2022     Cholesterol Lab  07/22/2022     Kidney Microalbumin Urine Test  07/22/2022     Diabetic Foot Exam  07/22/2022       Understanding USDA MyPlate  The USDA has guidelines to help you make healthy food choices. These are called MyPlate. MyPlate shows the food groups that make up healthy meals using the image of a place setting. Before you eat, think about the healthiest choices for what to put on your plate or in your cup or bowl. To learn more about building a healthy plate, visit www.choosemyplate.gov.    The food groups    Fruits. Any fruit or 100% fruit juice counts as part of the Fruit Group. Fruits may be fresh, canned, frozen, or dried, and may be whole, cut-up, or pureed. Make 1/2 of your plate fruits and vegetables.    Vegetables. Any vegetable or 100% vegetable juice counts as a member of the Vegetable Group. Vegetables may be fresh, frozen, canned, or dried. They can be served raw or cooked and may be whole, cut-up, or mashed. Make 1/2 of your plate fruits and vegetables.    Grains. All foods made from grains are part of the Grains Group. These include wheat, rice, oats, cornmeal, and barley. Grains are often used to make foods such as bread, pasta, oatmeal, cereal, tortillas, and grits. Grains should be no more than 1/4 of your plate. At least half of your grains should be whole grains.    Protein. This group includes meat, poultry, seafood, beans and peas, eggs, processed soy products (such as tofu), nuts (including nut butters), and seeds. Make protein choices no more than 1/4 of your plate. Meat and poultry choices should  be lean or low fat.    Dairy. The Dairy Group includes all fluid milk products and foods made from milk that contain calcium, such as yogurt and cheese. (Foods that have little calcium, such as cream, butter, and cream cheese, are not part of this group.) Most dairy choices should be low-fat or fat-free.    Oils. Oils aren't a food group, but they do contain essential nutrients. However it's important to watch your intake of oils. These are fats that are liquid at room temperature. They include canola, corn, olive, soybean, vegetable, and sunflower oil. Foods that are mainly oil include mayonnaise, certain salad dressings, and soft margarines. You likely already get your daily oil allowance from the foods you eat.  Things to limit  Eating healthy also means limiting these things in your diet:       Salt (sodium). Many processed foods have a lot of sodium. To keep sodium intake down, eat fresh vegetables, meats, poultry, and seafood when possible. Purchase low-sodium, reduced-sodium, or no-salt-added food products at the store. And don't add salt to your meals at home. Instead, season them with herbs and spices such as dill, oregano, cumin, and paprika. Or try adding flavor with lemon or lime zest and juice.    Saturated fat. Saturated fats are most often found in animal products such as beef, pork, and chicken. They are often solid at room temperature, such as butter. To reduce your saturated fat intake, choose leaner cuts of meat and poultry. And try healthier cooking methods such as grilling, broiling, roasting, or baking. For a simple lower-fat swap, use plain nonfat yogurt instead of mayonnaise when making potato salad or macaroni salad.    Added sugars. These are sugars added to foods. They are in foods such as ice cream, candy, soda, fruit drinks, sports drinks, energy drinks, cookies, pastries, jams, and syrups. Cut down on added sugars by sharing sweet treats with a family member or friend. You can also  choose fruit for dessert, and drink water or other unsweetened beverages.     [employer] last reviewed this educational content on 6/1/2020 2000-2021 [employer]. All rights reserved. This information is not intended as a substitute for professional medical care. Always follow your healthcare professional's instructions.          Urinary Incontinence, Female (Adult)   Urinary incontinence means loss of bladder control. This problem affects many women, especially as they get older. If you have incontinence, you may be embarrassed to ask for help. But know that this problem can be treated.   Types of Incontinence  There are different types of incontinence. Two of the main types are described here. You can have more than one type.     Stress incontinence. With this type, urine leaks when pressure (stress) is put on the bladder. This may happen when you cough, sneeze, or laugh. Stress incontinence most often occurs because the pelvic floor muscles that support the bladder and urethra are weak. This can happen after pregnancy and vaginal childbirth or a hysterectomy. It can also be due to excess body weight or hormone changes.    Urge incontinence (also called overactive bladder). With this type, a sudden urge to urinate is felt often. This may happen even though there may not be much urine in the bladder. The need to urinate often during the night is common. Urge incontinence most often occurs because of bladder spasms. This may be due to bladder irritation or infection. Damage to bladder nerves or pelvic muscles, constipation, and certain medicines can also lead to urge incontinence.  Treatment depends on the cause. Further evaluation is needed to find the type you have. This will likely include an exam and certain tests. Based on the results, you and your healthcare provider can then plan treatment. Until a diagnosis is made, the home care tips below can help ease symptoms.   Home care    Do pelvic  floor muscle exercises, if they are prescribed. The pelvic floor muscles help support the bladder and urethra. Many women find that their symptoms improve when doing special exercises that strengthen these muscles. To do the exercises, contract the muscles you would use to stop your stream of urine. But do this when you re not urinating. Hold for 10 seconds, then relax. Repeat 10 to 20 times in a row, at least 3 times a day. Your healthcare provider may give you other instructions for how to do the exercises and how often.    Keep a bladder diary. This helps track how often and how much you urinate over a set period of time. Bring this diary with you to your next visit with the provider. The information can help your provider learn more about your bladder problem.    Lose weight, if advised to by your provider. Extra weight puts pressure on the bladder. Your provider can help you create a weight-loss plan that s right for you. This may include exercising more and making certain diet changes.    Don't have foods and drinks that may irritate the bladder. These can include alcohol and caffeinated drinks.    Quit smoking. Smoking and other tobacco use can lead to a long-term (chronic) cough that strains the pelvic floor muscles. Smoking may also damage the bladder and urethra. Talk with your provider about treatments or methods you can use to quit smoking.    If drinking large amounts of fluid makes you have symptoms, you may be advised to limit your fluid intake. You may also be advised to drink most of your fluids during the day and to limit fluids at night.    If you re worried about urine leakage or accidents, you may wear absorbent pads to catch urine. Change the pads often. This helps reduce discomfort. It may also reduce the risk of skin or bladder infections.    Follow-up care  Follow up with your healthcare provider, or as directed. It may take some to find the right treatment for your problem. But healthy  lifestyle changes can be made right away. These include such things as exercising on a regular basis, eating a healthy diet, losing weight (if needed), and quitting smoking. Your treatment plan may include special therapies or medicines. Certain procedures or surgery may also be options. Talk about any questions you have with your provider.   When to seek medical advice  Call the healthcare provider right away if any of these occur:    Fever of 100.4 F (38 C) or higher, or as directed by your provider    Bladder pain or fullness    Belly swelling    Nausea or vomiting    Back pain    Weakness, dizziness, or fainting  [employer] last reviewed this educational content on 1/1/2020 2000-2021 [employer]. All rights reserved. This information is not intended as a substitute for professional medical care. Always follow your healthcare professional's instructions.

## 2023-01-17 ENCOUNTER — TELEPHONE (OUTPATIENT)
Dept: INTERNAL MEDICINE | Facility: OTHER | Age: 68
End: 2023-01-17

## 2023-01-17 ENCOUNTER — HOSPITAL ENCOUNTER (OUTPATIENT)
Dept: MAMMOGRAPHY | Facility: OTHER | Age: 68
Discharge: HOME | End: 2023-01-17
Attending: INTERNAL MEDICINE
Payer: COMMERCIAL

## 2023-01-17 ENCOUNTER — LAB (OUTPATIENT)
Dept: LAB | Facility: OTHER | Age: 68
End: 2023-01-17
Attending: INTERNAL MEDICINE
Payer: COMMERCIAL

## 2023-01-17 ENCOUNTER — OFFICE VISIT (OUTPATIENT)
Dept: INTERNAL MEDICINE | Facility: OTHER | Age: 68
End: 2023-01-17
Attending: INTERNAL MEDICINE
Payer: COMMERCIAL

## 2023-01-17 VITALS
HEART RATE: 84 BPM | BODY MASS INDEX: 28.12 KG/M2 | OXYGEN SATURATION: 97 % | HEIGHT: 65 IN | WEIGHT: 168.8 LBS | SYSTOLIC BLOOD PRESSURE: 138 MMHG | RESPIRATION RATE: 20 BRPM | DIASTOLIC BLOOD PRESSURE: 70 MMHG | TEMPERATURE: 97 F

## 2023-01-17 DIAGNOSIS — E78.2 MIXED HYPERLIPIDEMIA: ICD-10-CM

## 2023-01-17 DIAGNOSIS — R60.0 BILATERAL LOWER EXTREMITY EDEMA: ICD-10-CM

## 2023-01-17 DIAGNOSIS — E66.3 OVERWEIGHT (BMI 25.0-29.9): ICD-10-CM

## 2023-01-17 DIAGNOSIS — N18.2 CONTROLLED TYPE 2 DIABETES MELLITUS WITH STAGE 2 CHRONIC KIDNEY DISEASE, WITHOUT LONG-TERM CURRENT USE OF INSULIN (H): ICD-10-CM

## 2023-01-17 DIAGNOSIS — E11.22 CONTROLLED TYPE 2 DIABETES MELLITUS WITH STAGE 2 CHRONIC KIDNEY DISEASE, WITHOUT LONG-TERM CURRENT USE OF INSULIN (H): Primary | ICD-10-CM

## 2023-01-17 DIAGNOSIS — N18.2 CONTROLLED TYPE 2 DIABETES MELLITUS WITH STAGE 2 CHRONIC KIDNEY DISEASE, WITHOUT LONG-TERM CURRENT USE OF INSULIN (H): Primary | ICD-10-CM

## 2023-01-17 DIAGNOSIS — I10 BENIGN ESSENTIAL HYPERTENSION: ICD-10-CM

## 2023-01-17 DIAGNOSIS — Z12.31 VISIT FOR SCREENING MAMMOGRAM: ICD-10-CM

## 2023-01-17 DIAGNOSIS — J30.2 SEASONAL ALLERGIC RHINITIS, UNSPECIFIED TRIGGER: ICD-10-CM

## 2023-01-17 DIAGNOSIS — E11.22 CONTROLLED TYPE 2 DIABETES MELLITUS WITH STAGE 2 CHRONIC KIDNEY DISEASE, WITHOUT LONG-TERM CURRENT USE OF INSULIN (H): ICD-10-CM

## 2023-01-17 LAB
ALBUMIN SERPL BCG-MCNC: 4 G/DL (ref 3.5–5.2)
ALBUMIN UR-MCNC: NEGATIVE MG/DL
ALP SERPL-CCNC: 102 U/L (ref 35–104)
ALT SERPL W P-5'-P-CCNC: 21 U/L (ref 10–35)
ANION GAP SERPL CALCULATED.3IONS-SCNC: 12 MMOL/L (ref 7–15)
APPEARANCE UR: CLEAR
AST SERPL W P-5'-P-CCNC: 32 U/L (ref 10–35)
BILIRUB SERPL-MCNC: 1 MG/DL
BILIRUB UR QL STRIP: NEGATIVE
BUN SERPL-MCNC: 10.9 MG/DL (ref 8–23)
CALCIUM SERPL-MCNC: 9.4 MG/DL (ref 8.8–10.2)
CHLORIDE SERPL-SCNC: 104 MMOL/L (ref 98–107)
CHOLEST SERPL-MCNC: 147 MG/DL
COLOR UR AUTO: ABNORMAL
CREAT SERPL-MCNC: 0.79 MG/DL (ref 0.51–0.95)
CREAT UR-MCNC: 112 MG/DL
DEPRECATED HCO3 PLAS-SCNC: 25 MMOL/L (ref 22–29)
ERYTHROCYTE [DISTWIDTH] IN BLOOD BY AUTOMATED COUNT: 12.5 % (ref 10–15)
GFR SERPL CREATININE-BSD FRML MDRD: 81 ML/MIN/1.73M2
GLUCOSE SERPL-MCNC: 102 MG/DL (ref 70–99)
GLUCOSE UR STRIP-MCNC: >1000 MG/DL
HBA1C MFR BLD: 6.6 % (ref 4–6.2)
HCT VFR BLD AUTO: 45.6 % (ref 35–47)
HDLC SERPL-MCNC: 49 MG/DL
HGB BLD-MCNC: 15.6 G/DL (ref 11.7–15.7)
HGB UR QL STRIP: NEGATIVE
KETONES UR STRIP-MCNC: ABNORMAL MG/DL
LDLC SERPL CALC-MCNC: 78 MG/DL
LEUKOCYTE ESTERASE UR QL STRIP: NEGATIVE
MCH RBC QN AUTO: 30.5 PG (ref 26.5–33)
MCHC RBC AUTO-ENTMCNC: 34.2 G/DL (ref 31.5–36.5)
MCV RBC AUTO: 89 FL (ref 78–100)
MICROALBUMIN UR-MCNC: 15.7 MG/L
MICROALBUMIN/CREAT UR: 14.02 MG/G CR (ref 0–25)
NITRATE UR QL: NEGATIVE
NONHDLC SERPL-MCNC: 98 MG/DL
PH UR STRIP: 5.5 [PH] (ref 5–9)
PLATELET # BLD AUTO: 214 10E3/UL (ref 150–450)
POTASSIUM SERPL-SCNC: 4 MMOL/L (ref 3.4–5.3)
PROT SERPL-MCNC: 8.4 G/DL (ref 6.4–8.3)
RBC # BLD AUTO: 5.11 10E6/UL (ref 3.8–5.2)
SODIUM SERPL-SCNC: 141 MMOL/L (ref 136–145)
SP GR UR STRIP: 1.04 (ref 1–1.03)
TRIGL SERPL-MCNC: 100 MG/DL
TSH SERPL DL<=0.005 MIU/L-ACNC: 2.1 UIU/ML (ref 0.3–4.2)
UROBILINOGEN UR STRIP-MCNC: NORMAL MG/DL
WBC # BLD AUTO: 5.9 10E3/UL (ref 4–11)

## 2023-01-17 PROCEDURE — 84443 ASSAY THYROID STIM HORMONE: CPT | Mod: ZL

## 2023-01-17 PROCEDURE — G0463 HOSPITAL OUTPT CLINIC VISIT: HCPCS

## 2023-01-17 PROCEDURE — 80053 COMPREHEN METABOLIC PANEL: CPT | Mod: ZL

## 2023-01-17 PROCEDURE — 36415 COLL VENOUS BLD VENIPUNCTURE: CPT | Mod: ZL

## 2023-01-17 PROCEDURE — 77067 SCR MAMMO BI INCL CAD: CPT

## 2023-01-17 PROCEDURE — G0463 HOSPITAL OUTPT CLINIC VISIT: HCPCS | Mod: 25

## 2023-01-17 PROCEDURE — 83036 HEMOGLOBIN GLYCOSYLATED A1C: CPT | Mod: ZL

## 2023-01-17 PROCEDURE — 85027 COMPLETE CBC AUTOMATED: CPT | Mod: ZL

## 2023-01-17 PROCEDURE — 99214 OFFICE O/P EST MOD 30 MIN: CPT | Performed by: INTERNAL MEDICINE

## 2023-01-17 PROCEDURE — 81003 URINALYSIS AUTO W/O SCOPE: CPT | Mod: ZL

## 2023-01-17 PROCEDURE — 80061 LIPID PANEL: CPT | Mod: ZL

## 2023-01-17 PROCEDURE — 82570 ASSAY OF URINE CREATININE: CPT | Mod: ZL

## 2023-01-17 RX ORDER — SEMAGLUTIDE 1.34 MG/ML
1 INJECTION, SOLUTION SUBCUTANEOUS WEEKLY
Qty: 3 ML | Refills: 1 | Status: SHIPPED | OUTPATIENT
Start: 2023-01-17 | End: 2023-01-17

## 2023-01-17 RX ORDER — CETIRIZINE HYDROCHLORIDE 10 MG/1
10 TABLET ORAL DAILY PRN
Qty: 90 TABLET | Refills: 1 | Status: SHIPPED | OUTPATIENT
Start: 2023-01-17 | End: 2023-05-12

## 2023-01-17 RX ORDER — FAMOTIDINE 20 MG/1
1 TABLET, FILM COATED ORAL DAILY PRN
COMMUNITY

## 2023-01-17 RX ORDER — METOPROLOL SUCCINATE 50 MG/1
50 TABLET, EXTENDED RELEASE ORAL EVERY EVENING
Qty: 90 TABLET | Refills: 1 | Status: SHIPPED | OUTPATIENT
Start: 2023-01-17 | End: 2023-05-12

## 2023-01-17 RX ORDER — SEMAGLUTIDE 1.34 MG/ML
1 INJECTION, SOLUTION SUBCUTANEOUS WEEKLY
Qty: 12 ML | Refills: 4 | Status: SHIPPED | OUTPATIENT
Start: 2023-01-17 | End: 2023-05-12

## 2023-01-17 ASSESSMENT — ENCOUNTER SYMPTOMS
ARTHRALGIAS: 0
FEVER: 0
BRUISES/BLEEDS EASILY: 0
ABDOMINAL PAIN: 0
LIGHT-HEADEDNESS: 0
DIZZINESS: 0
PALPITATIONS: 1
CONSTIPATION: 0
DYSURIA: 0
DIARRHEA: 0
WHEEZING: 0
AGITATION: 0
HEMATOCHEZIA: 0
CONFUSION: 0
HEMATURIA: 0
VOMITING: 0
COUGH: 0
WOUND: 0
BREAST MASS: 0
CHILLS: 0
MYALGIAS: 0
SHORTNESS OF BREATH: 0
HEARTBURN: 1
NAUSEA: 0

## 2023-01-17 ASSESSMENT — PAIN SCALES - GENERAL: PAINLEVEL: NO PAIN (0)

## 2023-01-17 NOTE — PROGRESS NOTES
Assessment & Plan   Margie Mccoy is a 67 year old, presenting for the following health issues:      ICD-10-CM    1. Controlled type 2 diabetes mellitus with stage 2 chronic kidney disease, without long-term current use of insulin (H)  E11.22 UA reflex to Microscopic    N18.2 TSH with free T4 reflex     Hemoglobin A1c     CBC with platelets     Albumin Random Urine Quantitative with Creat Ratio     Comprehensive metabolic panel     ASPIRIN NOT PRESCRIBED (INTENTIONAL)     empagliflozin (JARDIANCE) 10 MG TABS tablet     Semaglutide, 1 MG/DOSE, (OZEMPIC, 1 MG/DOSE,) 4 MG/3ML SOPN      2. Overweight (BMI 25.0-29.9)  E66.3 empagliflozin (JARDIANCE) 10 MG TABS tablet     Semaglutide, 1 MG/DOSE, (OZEMPIC, 1 MG/DOSE,) 4 MG/3ML SOPN      3. Mixed hyperlipidemia  E78.2 Lipid Profile      4. Benign essential hypertension  I10 empagliflozin (JARDIANCE) 10 MG TABS tablet     metoprolol succinate ER (TOPROL XL) 50 MG 24 hr tablet      5. Bilateral lower extremity edema  R60.0 empagliflozin (JARDIANCE) 10 MG TABS tablet      6. Seasonal allergic rhinitis, unspecified trigger  J30.2 cetirizine (ZYRTEC) 10 MG tablet      Patient presents for diabetes follow-up as well as follow-up multiple issues.    Type 2 diabetes, currently well controlled.  Using combination of Jardiance and semaglutide injections.  He is no longer taking aspirin and is not interested at this time.  Standing orders updated.    History of morbid obesity, now overweight.  Has lost considerable amount of weight.  Edema is improved.  Blood sugars are improved.      Mixed hyperlipidemia.  Cholesterol levels have improved.  She was having a lot of myalgias with statin therapy.  Goal LDL is less than 70.  She is not at goal.  Hopefully with additional lifestyle modifications, weight loss, improvement in blood sugar levels, her cholesterol levels will improve otherwise we will need to consider dose adjustment or dose increase of her Crestor.    Seasonal allergic  rhinitis, ongoing.  Continues with Zyrtec.  Needs refills.    Encouraged weight loss and regular exercise.     Return in about 3 months (around 4/17/2023) for - Labs every 91+ days, with DM Follow-up, Same Day or 1-2 days later with Dr. Bentley.    Alexander Bentley MD  [facility]     Subjective   Diabetes      History of Present Illness       Diabetes:   She presents for follow up of diabetes.  She is checking home blood glucose one time daily. She checks blood glucose before meals.  Blood glucose is never over 200 and sometimes under 70. She is aware of hypoglycemia symptoms including shakiness, dizziness and weakness. She has no concerns regarding her diabetes at this time.  She is not experiencing numbness or burning in feet, excessive thirst, blurry vision, weight changes or redness, sores or blisters on feet. The patient has had a diabetic eye exam in the last 12 months. Eye exam performed on 06/21/2022. Location of last eye exam [facility].        Hypertension: She presents for follow up of hypertension.  She does check blood pressure  regularly outside of the clinic. Outpatient blood pressures have not been over 140/90. She follows a low salt diet.       Review of Systems   Constitutional: Negative for chills and fever.   HENT: Negative for congestion and hearing loss.    Eyes: Negative for visual disturbance.   Respiratory: Negative for cough, shortness of breath and wheezing.    Cardiovascular: Positive for palpitations. Negative for chest pain.   Gastrointestinal: Positive for heartburn. Negative for abdominal pain, constipation, diarrhea, hematochezia, nausea and vomiting.   Endocrine: Negative for cold intolerance and heat intolerance.   Breasts:  Negative for tenderness, breast mass and discharge.   Genitourinary: Negative for dysuria, hematuria, pelvic pain, vaginal bleeding and vaginal discharge.   Musculoskeletal: Negative for arthralgias and myalgias.   Skin: Negative for  "rash and wound.   Allergic/Immunologic: Negative for immunocompromised state.   Neurological: Negative for dizziness and light-headedness.   Hematological: Does not bruise/bleed easily.   Psychiatric/Behavioral: Negative for agitation and confusion.          Objective    /70 (BP Location: Right arm, Patient Position: Sitting, Cuff Size: Adult Regular)   Pulse 84   Temp 97  F (36.1  C) (Temporal)   Resp 20   Ht 1.651 m (5' 5\")   Wt 76.6 kg (168 lb 12.8 oz)   SpO2 97%   BMI 28.09 kg/m    Body mass index is 28.09 kg/m .  Physical Exam  Constitutional:       General: She is not in acute distress.     Appearance: She is well-developed. She is not diaphoretic.   HENT:      Head: Normocephalic and atraumatic.   Eyes:      General: No scleral icterus.     Conjunctiva/sclera: Conjunctivae normal.   Neck:      Vascular: No carotid bruit.   Cardiovascular:      Rate and Rhythm: Normal rate and regular rhythm.      Pulses: Normal pulses.   Pulmonary:      Effort: Pulmonary effort is normal.      Breath sounds: Normal breath sounds.   Abdominal:      Palpations: Abdomen is soft.      Tenderness: There is no abdominal tenderness.   Musculoskeletal:         General: No deformity.      Cervical back: Neck supple.      Right lower leg: No edema.      Left lower leg: No edema.   Lymphadenopathy:      Cervical: No cervical adenopathy.   Skin:     General: Skin is warm and dry.      Coloration: Skin is not jaundiced.      Findings: No rash.   Neurological:      Mental Status: She is alert and oriented to person, place, and time. Mental status is at baseline.   Psychiatric:         Mood and Affect: Mood normal.         Behavior: Behavior normal.          Recent Labs   Lab Test 01/17/23  0927 10/12/22  1109 07/22/21  1215   A1C 6.6* 7.8* 7.0*   LDL 78 90 117*   HDL 49* 51 53   TRIG 100 81 129   ALT 21 35 38   CR 0.79 0.81 0.85   GFRESTIMATED 81 78 71   POTASSIUM 4.0 3.9 3.8   TSH 2.10 1.41 1.95   WBC 5.9 5.7 6.3   HGB 15.6 " 14.4 15.6    210 211   ALBUMIN 4.0 3.9 4.4     Hemoglobin A1c is well controlled.  LDL is high and not at goal.  HDL and triglycerides are at goal.  ALT is normal.  Creatinine is at baseline.  Potassium normal.  TSH normal.  CBC normal.  Albumin normal.

## 2023-01-17 NOTE — TELEPHONE ENCOUNTER
We have a Rx for Margie for Ozempic that states to inject 1 mg weekly after completing titration but we don't have a Rx for the 1 mg pen. Sending new Rx for Ozempic 1 mg per CPA.    Will Montgomery, PharmD  [facility]

## 2023-01-17 NOTE — PATIENT INSTRUCTIONS
Blood pressure is well controlled.   Diabetes is well controlled.     Medications refilled.   Labs are stable and have improved.     Results for orders placed or performed in visit on 01/17/23   Comprehensive metabolic panel     Status: Abnormal   Result Value Ref Range    Sodium 141 136 - 145 mmol/L    Potassium 4.0 3.4 - 5.3 mmol/L    Chloride 104 98 - 107 mmol/L    Carbon Dioxide (CO2) 25 22 - 29 mmol/L    Anion Gap 12 7 - 15 mmol/L    Urea Nitrogen 10.9 8.0 - 23.0 mg/dL    Creatinine 0.79 0.51 - 0.95 mg/dL    Calcium 9.4 8.8 - 10.2 mg/dL    Glucose 102 (H) 70 - 99 mg/dL    Alkaline Phosphatase 102 35 - 104 U/L    AST 32 10 - 35 U/L    ALT 21 10 - 35 U/L    Protein Total 8.4 (H) 6.4 - 8.3 g/dL    Albumin 4.0 3.5 - 5.2 g/dL    Bilirubin Total 1.0 <=1.2 mg/dL    GFR Estimate 81 >60 mL/min/1.73m2   Lipid Profile     Status: Abnormal   Result Value Ref Range    Cholesterol 147 <200 mg/dL    Triglycerides 100 <150 mg/dL    Direct Measure HDL 49 (L) >=50 mg/dL    LDL Cholesterol Calculated 78 <=100 mg/dL    Non HDL Cholesterol 98 <130 mg/dL    Narrative    Cholesterol  Desirable:  <200 mg/dL    Triglycerides  Normal:  Less than 150 mg/dL  Borderline High:  150-199 mg/dL  High:  200-499 mg/dL  Very High:  Greater than or equal to 500 mg/dL    Direct Measure HDL  Female:  Greater than or equal to 50 mg/dL   Male:  Greater than or equal to 40 mg/dL    LDL Cholesterol  Desirable:  <100mg/dL  Above Desirable:  100-129 mg/dL   Borderline High:  130-159 mg/dL   High:  160-189 mg/dL   Very High:  >= 190 mg/dL    Non HDL Cholesterol  Desirable:  130 mg/dL  Above Desirable:  130-159 mg/dL  Borderline High:  160-189 mg/dL  High:  190-219 mg/dL  Very High:  Greater than or equal to 220 mg/dL   CBC with platelets     Status: Normal   Result Value Ref Range    WBC Count 5.9 4.0 - 11.0 10e3/uL    RBC Count 5.11 3.80 - 5.20 10e6/uL    Hemoglobin 15.6 11.7 - 15.7 g/dL    Hematocrit 45.6 35.0 - 47.0 %    MCV 89 78 - 100 fL    MCH 30.5  26.5 - 33.0 pg    MCHC 34.2 31.5 - 36.5 g/dL    RDW 12.5 10.0 - 15.0 %    Platelet Count 214 150 - 450 10e3/uL   Hemoglobin A1c     Status: Abnormal   Result Value Ref Range    Hemoglobin A1C 6.6 (H) 4.0 - 6.2 %   TSH with free T4 reflex     Status: Normal   Result Value Ref Range    TSH 2.10 0.30 - 4.20 uIU/mL      Aspects of Diabetes:   Recent Labs   Lab Test 01/17/23  0927 10/12/22  1109 07/22/21  1215   A1C 6.6* 7.8* 7.0*   LDL 78 90 117*   HDL 49* 51 53   TRIG 100 81 129   ALT 21 35 38   CR 0.79 0.81 0.85   GFRESTIMATED 81 78 71   POTASSIUM 4.0 3.9 3.8   TSH 2.10 1.41 1.95   WBC 5.9 5.7 6.3   HGB 15.6 14.4 15.6    210 211   ALBUMIN 4.0 3.9 4.4      Hemoglobin A1c  Goal range is under 8%. Best is 6.5 to 7   Blood Pressure 138/70 Goal to keep less than 140/90   Tobacco  reports that she has never smoked. She has never used smokeless tobacco. Goal to abstain from tobacco   Aspirin or Plavix Anti-platelet therapy Aspirin or Plavix reduces risk of heart disease and stroke  -- sometimes used with other blood thinners, depending on bleeding risk and risk factors.    ACE/ARB Specific blood pressure meds These medications can reduce risk of kidney disease   Cholesterol Statins (Lipitor, Crestor, vs others) Statins reduce risk of heart disease and stroke   Eye Exam -- Do Yearly -- Annual diabetic eye exam   Healthy weight Wt Readings from Last 4 Encounters:   01/17/23 76.6 kg (168 lb 12.8 oz)   10/12/22 81.4 kg (179 lb 6.4 oz)   07/22/21 76.9 kg (169 lb 9.6 oz)   02/07/20 72.6 kg (160 lb)      Body mass index is 28.09 kg/m .  Goal BMI under 30, best is under 25.      -- Trying to exercise daily (goal at least 20 min/day) with moderate aerobic activity   -- Eat healthy (resources from ADA at http://www.diabetes.org/)   -- Taking good care of my feet. Consider seeing the Podiatrist   -- Check blood sugars as directed, record in log book and bring to every appointment    Insurance companies are grading you and I on  your blood sugar control -- Goal is to get your A1c down to 7.9% or lower and NO Smoking!  -- Medicare and most insurance companies, will only cover Hemoglobin A1c labs to be rechecked every 91+ days.      Return for Diabetes labs and clinic follow-up appointment every 3 to 4 months.    Schedule lab only appointment --- A few days AFTER: 4/17/23   Schedule clinic appointment with Dr. Bentley -- Same day as labs, or 1-2 days later.          Some potential online pharmacies that are reliable:    [employer]

## 2023-05-12 ENCOUNTER — OFFICE VISIT (OUTPATIENT)
Dept: INTERNAL MEDICINE | Facility: OTHER | Age: 68
End: 2023-05-12
Attending: INTERNAL MEDICINE
Payer: COMMERCIAL

## 2023-05-12 ENCOUNTER — LAB (OUTPATIENT)
Dept: LAB | Facility: OTHER | Age: 68
End: 2023-05-12
Attending: INTERNAL MEDICINE
Payer: COMMERCIAL

## 2023-05-12 VITALS
RESPIRATION RATE: 20 BRPM | SYSTOLIC BLOOD PRESSURE: 128 MMHG | TEMPERATURE: 98 F | BODY MASS INDEX: 26.92 KG/M2 | DIASTOLIC BLOOD PRESSURE: 74 MMHG | OXYGEN SATURATION: 97 % | HEART RATE: 70 BPM | WEIGHT: 161.8 LBS

## 2023-05-12 DIAGNOSIS — E78.2 MIXED HYPERLIPIDEMIA: ICD-10-CM

## 2023-05-12 DIAGNOSIS — E11.22 CONTROLLED TYPE 2 DIABETES MELLITUS WITH STAGE 2 CHRONIC KIDNEY DISEASE, WITHOUT LONG-TERM CURRENT USE OF INSULIN (H): ICD-10-CM

## 2023-05-12 DIAGNOSIS — E11.22 CONTROLLED TYPE 2 DIABETES MELLITUS WITH STAGE 2 CHRONIC KIDNEY DISEASE, WITHOUT LONG-TERM CURRENT USE OF INSULIN (H): Primary | ICD-10-CM

## 2023-05-12 DIAGNOSIS — E66.3 OVERWEIGHT (BMI 25.0-29.9): ICD-10-CM

## 2023-05-12 DIAGNOSIS — N18.2 CONTROLLED TYPE 2 DIABETES MELLITUS WITH STAGE 2 CHRONIC KIDNEY DISEASE, WITHOUT LONG-TERM CURRENT USE OF INSULIN (H): ICD-10-CM

## 2023-05-12 DIAGNOSIS — L71.9 ROSACEA: ICD-10-CM

## 2023-05-12 DIAGNOSIS — Z91.09 ENVIRONMENTAL ALLERGIES: ICD-10-CM

## 2023-05-12 DIAGNOSIS — N18.2 CONTROLLED TYPE 2 DIABETES MELLITUS WITH STAGE 2 CHRONIC KIDNEY DISEASE, WITHOUT LONG-TERM CURRENT USE OF INSULIN (H): Primary | ICD-10-CM

## 2023-05-12 DIAGNOSIS — I10 BENIGN ESSENTIAL HYPERTENSION: ICD-10-CM

## 2023-05-12 PROBLEM — R19.5 POSITIVE COLORECTAL CANCER SCREENING USING COLOGUARD TEST: Status: RESOLVED | Noted: 2020-01-17 | Resolved: 2023-05-12

## 2023-05-12 LAB
ALBUMIN SERPL BCG-MCNC: 3.9 G/DL (ref 3.5–5.2)
ALBUMIN UR-MCNC: NEGATIVE MG/DL
ALP SERPL-CCNC: 117 U/L (ref 35–104)
ALT SERPL W P-5'-P-CCNC: 15 U/L (ref 10–35)
ANION GAP SERPL CALCULATED.3IONS-SCNC: 9 MMOL/L (ref 7–15)
APPEARANCE UR: CLEAR
AST SERPL W P-5'-P-CCNC: 25 U/L (ref 10–35)
BILIRUB SERPL-MCNC: 1.1 MG/DL
BILIRUB UR QL STRIP: NEGATIVE
BUN SERPL-MCNC: 10.8 MG/DL (ref 8–23)
CALCIUM SERPL-MCNC: 9 MG/DL (ref 8.8–10.2)
CHLORIDE SERPL-SCNC: 104 MMOL/L (ref 98–107)
CHOLEST SERPL-MCNC: 157 MG/DL
COLOR UR AUTO: ABNORMAL
CREAT SERPL-MCNC: 0.84 MG/DL (ref 0.51–0.95)
CREAT UR-MCNC: 72.8 MG/DL
DEPRECATED HCO3 PLAS-SCNC: 27 MMOL/L (ref 22–29)
ERYTHROCYTE [DISTWIDTH] IN BLOOD BY AUTOMATED COUNT: 12.9 % (ref 10–15)
GFR SERPL CREATININE-BSD FRML MDRD: 75 ML/MIN/1.73M2
GLUCOSE SERPL-MCNC: 115 MG/DL (ref 70–99)
GLUCOSE UR STRIP-MCNC: >1000 MG/DL
HBA1C MFR BLD: 6.3 % (ref 4–6.2)
HCT VFR BLD AUTO: 44.5 % (ref 35–47)
HDLC SERPL-MCNC: 47 MG/DL
HGB BLD-MCNC: 15.3 G/DL (ref 11.7–15.7)
HGB UR QL STRIP: NEGATIVE
HOLD SPECIMEN: NORMAL
HYALINE CASTS: 3 /LPF
KETONES UR STRIP-MCNC: NEGATIVE MG/DL
LDLC SERPL CALC-MCNC: 93 MG/DL
LEUKOCYTE ESTERASE UR QL STRIP: ABNORMAL
MCH RBC QN AUTO: 30.7 PG (ref 26.5–33)
MCHC RBC AUTO-ENTMCNC: 34.4 G/DL (ref 31.5–36.5)
MCV RBC AUTO: 89 FL (ref 78–100)
MICROALBUMIN UR-MCNC: <12 MG/L
MICROALBUMIN/CREAT UR: NORMAL MG/G{CREAT}
MUCOUS THREADS #/AREA URNS LPF: PRESENT /LPF
NITRATE UR QL: NEGATIVE
NONHDLC SERPL-MCNC: 110 MG/DL
PH UR STRIP: 5 [PH] (ref 5–9)
PLATELET # BLD AUTO: 189 10E3/UL (ref 150–450)
POTASSIUM SERPL-SCNC: 4.1 MMOL/L (ref 3.4–5.3)
PROT SERPL-MCNC: 7.6 G/DL (ref 6.4–8.3)
RBC # BLD AUTO: 4.98 10E6/UL (ref 3.8–5.2)
RBC URINE: 1 /HPF
SODIUM SERPL-SCNC: 140 MMOL/L (ref 136–145)
SP GR UR STRIP: 1.03 (ref 1–1.03)
SQUAMOUS EPITHELIAL: 8 /HPF
TRIGL SERPL-MCNC: 84 MG/DL
TSH SERPL DL<=0.005 MIU/L-ACNC: 1.35 UIU/ML (ref 0.3–4.2)
UROBILINOGEN UR STRIP-MCNC: NORMAL MG/DL
WBC # BLD AUTO: 4.8 10E3/UL (ref 4–11)
WBC URINE: 3 /HPF

## 2023-05-12 PROCEDURE — 99214 OFFICE O/P EST MOD 30 MIN: CPT | Performed by: INTERNAL MEDICINE

## 2023-05-12 PROCEDURE — 84443 ASSAY THYROID STIM HORMONE: CPT | Mod: ZL

## 2023-05-12 PROCEDURE — 80053 COMPREHEN METABOLIC PANEL: CPT | Mod: ZL

## 2023-05-12 PROCEDURE — G0463 HOSPITAL OUTPT CLINIC VISIT: HCPCS

## 2023-05-12 PROCEDURE — 85027 COMPLETE CBC AUTOMATED: CPT | Mod: ZL

## 2023-05-12 PROCEDURE — 36415 COLL VENOUS BLD VENIPUNCTURE: CPT | Mod: ZL

## 2023-05-12 PROCEDURE — 80061 LIPID PANEL: CPT | Mod: ZL

## 2023-05-12 PROCEDURE — 83036 HEMOGLOBIN GLYCOSYLATED A1C: CPT | Mod: ZL

## 2023-05-12 PROCEDURE — 82570 ASSAY OF URINE CREATININE: CPT | Mod: ZL

## 2023-05-12 PROCEDURE — 81001 URINALYSIS AUTO W/SCOPE: CPT | Mod: ZL

## 2023-05-12 RX ORDER — DOXYCYCLINE 100 MG/1
100 CAPSULE ORAL DAILY PRN
Qty: 90 CAPSULE | Refills: 1 | Status: SHIPPED | OUTPATIENT
Start: 2023-05-12 | End: 2024-04-09

## 2023-05-12 RX ORDER — SEMAGLUTIDE 1.34 MG/ML
1 INJECTION, SOLUTION SUBCUTANEOUS WEEKLY
Qty: 12 ML | Refills: 4 | Status: SHIPPED | OUTPATIENT
Start: 2023-05-12 | End: 2024-04-09

## 2023-05-12 RX ORDER — ROSUVASTATIN CALCIUM 5 MG/1
5 TABLET, COATED ORAL DAILY
Qty: 90 TABLET | Refills: 4 | Status: SHIPPED | OUTPATIENT
Start: 2023-05-12 | End: 2024-04-09

## 2023-05-12 RX ORDER — FEXOFENADINE HCL 60 MG/1
60 TABLET, FILM COATED ORAL 2 TIMES DAILY PRN
Qty: 180 TABLET | Refills: 4 | Status: SHIPPED | OUTPATIENT
Start: 2023-05-12 | End: 2024-04-09

## 2023-05-12 RX ORDER — METOPROLOL SUCCINATE 50 MG/1
50 TABLET, EXTENDED RELEASE ORAL EVERY EVENING
Qty: 90 TABLET | Refills: 1 | Status: SHIPPED | OUTPATIENT
Start: 2023-05-12 | End: 2023-11-29

## 2023-05-12 ASSESSMENT — ENCOUNTER SYMPTOMS
COUGH: 0
FEVER: 0
VOMITING: 0
WOUND: 0
CONSTIPATION: 0
DIZZINESS: 0
WHEEZING: 0
BRUISES/BLEEDS EASILY: 0
HEMATOCHEZIA: 0
NAUSEA: 0
ABDOMINAL PAIN: 0
BREAST MASS: 0
PALPITATIONS: 1
MYALGIAS: 0
DIARRHEA: 0
ARTHRALGIAS: 0
AGITATION: 0
CHILLS: 0
DYSURIA: 0
CONFUSION: 0
HEARTBURN: 1
HEMATURIA: 0
SHORTNESS OF BREATH: 0
LIGHT-HEADEDNESS: 0

## 2023-05-12 ASSESSMENT — PAIN SCALES - GENERAL: PAINLEVEL: NO PAIN (0)

## 2023-05-12 NOTE — PATIENT INSTRUCTIONS
Blood pressure is well controlled.   Diabetes is well controlled.     Medications refilled.   Labs are stable.     --> Increase Crestor to daily use.     Wt Readings from Last 5 Encounters:   05/12/23 73.4 kg (161 lb 12.8 oz)   01/17/23 76.6 kg (168 lb 12.8 oz)   10/12/22 81.4 kg (179 lb 6.4 oz)   07/22/21 76.9 kg (169 lb 9.6 oz)   02/07/20 72.6 kg (160 lb)        Results for orders placed or performed in visit on 05/12/23   Comprehensive metabolic panel     Status: Abnormal   Result Value Ref Range    Sodium 140 136 - 145 mmol/L    Potassium 4.1 3.4 - 5.3 mmol/L    Chloride 104 98 - 107 mmol/L    Carbon Dioxide (CO2) 27 22 - 29 mmol/L    Anion Gap 9 7 - 15 mmol/L    Urea Nitrogen 10.8 8.0 - 23.0 mg/dL    Creatinine 0.84 0.51 - 0.95 mg/dL    Calcium 9.0 8.8 - 10.2 mg/dL    Glucose 115 (H) 70 - 99 mg/dL    Alkaline Phosphatase 117 (H) 35 - 104 U/L    AST 25 10 - 35 U/L    ALT 15 10 - 35 U/L    Protein Total 7.6 6.4 - 8.3 g/dL    Albumin 3.9 3.5 - 5.2 g/dL    Bilirubin Total 1.1 <=1.2 mg/dL    GFR Estimate 75 >60 mL/min/1.73m2   Lipid Profile     Status: Abnormal   Result Value Ref Range    Cholesterol 157 <200 mg/dL    Triglycerides 84 <150 mg/dL    Direct Measure HDL 47 (L) >=50 mg/dL    LDL Cholesterol Calculated 93 <=100 mg/dL    Non HDL Cholesterol 110 <130 mg/dL    Narrative    Cholesterol  Desirable:  <200 mg/dL    Triglycerides  Normal:  Less than 150 mg/dL  Borderline High:  150-199 mg/dL  High:  200-499 mg/dL  Very High:  Greater than or equal to 500 mg/dL    Direct Measure HDL  Female:  Greater than or equal to 50 mg/dL   Male:  Greater than or equal to 40 mg/dL    LDL Cholesterol  Desirable:  <100mg/dL  Above Desirable:  100-129 mg/dL   Borderline High:  130-159 mg/dL   High:  160-189 mg/dL   Very High:  >= 190 mg/dL    Non HDL Cholesterol  Desirable:  130 mg/dL  Above Desirable:  130-159 mg/dL  Borderline High:  160-189 mg/dL  High:  190-219 mg/dL  Very High:  Greater than or equal to 220 mg/dL   Albumin  Random Urine Quantitative with Creat Ratio     Status: None   Result Value Ref Range    Creatinine Urine mg/dL 72.8 mg/dL    Albumin Urine mg/L <12.0 mg/L    Albumin Urine mg/g Cr     CBC with platelets     Status: Normal   Result Value Ref Range    WBC Count 4.8 4.0 - 11.0 10e3/uL    RBC Count 4.98 3.80 - 5.20 10e6/uL    Hemoglobin 15.3 11.7 - 15.7 g/dL    Hematocrit 44.5 35.0 - 47.0 %    MCV 89 78 - 100 fL    MCH 30.7 26.5 - 33.0 pg    MCHC 34.4 31.5 - 36.5 g/dL    RDW 12.9 10.0 - 15.0 %    Platelet Count 189 150 - 450 10e3/uL   Hemoglobin A1c     Status: Abnormal   Result Value Ref Range    Hemoglobin A1C 6.3 (H) 4.0 - 6.2 %   TSH with free T4 reflex     Status: Normal   Result Value Ref Range    TSH 1.35 0.30 - 4.20 uIU/mL   UA reflex to Microscopic     Status: Abnormal   Result Value Ref Range    Color Urine Light Yellow Colorless, Straw, Light Yellow, Yellow    Appearance Urine Clear Clear    Glucose Urine >1000 (A) Negative mg/dL    Bilirubin Urine Negative Negative    Ketones Urine Negative Negative mg/dL    Specific Gravity Urine 1.029 1.000 - 1.030    Blood Urine Negative Negative    pH Urine 5.0 5.0 - 9.0    Protein Albumin Urine Negative Negative mg/dL    Urobilinogen Urine Normal Normal, 2.0 mg/dL    Nitrite Urine Negative Negative    Leukocyte Esterase Urine Small (A) Negative    RBC Urine 1 <=2 /HPF    WBC Urine 3 <=5 /HPF    Squamous Epithelials Urine 8 (H) <=1 /HPF    Mucus Urine Present (A) None Seen /LPF    Hyaline Casts Urine 3 (H) <=2 /LPF   Extra Tube     Status: None (In process)    Narrative    The following orders were created for panel order Extra Tube.  Procedure                               Abnormality         Status                     ---------                               -----------         ------                     Extra Serum Separator Tu...[[id number]]                      In process                   Please view results for these tests on the individual orders.      Aspects of  Diabetes:   Recent Labs   Lab Test 05/12/23  0838 01/17/23  0927 10/12/22  1109   A1C 6.3* 6.6* 7.8*   LDL 93 78 90   HDL 47* 49* 51   TRIG 84 100 81   ALT 15 21 35   CR 0.84 0.79 0.81   GFRESTIMATED 75 81 78   POTASSIUM 4.1 4.0 3.9   TSH 1.35 2.10 1.41   WBC 4.8 5.9 5.7   HGB 15.3 15.6 14.4    214 210   ALBUMIN 3.9 4.0 3.9      Hemoglobin A1c  Goal range is under 8%. Best is 6.5 to 7   Blood Pressure 128/74 Goal to keep less than 140/90   Tobacco  reports that she has never smoked. She has never used smokeless tobacco. Goal to abstain from tobacco   Aspirin or Plavix Anti-platelet therapy Aspirin or Plavix reduces risk of heart disease and stroke  -- sometimes used with other blood thinners, depending on bleeding risk and risk factors.    ACE/ARB Specific blood pressure meds These medications can reduce risk of kidney disease   Cholesterol Statins (Lipitor, Crestor, vs others) Statins reduce risk of heart disease and stroke   Eye Exam -- Do Yearly -- Annual diabetic eye exam   Healthy weight Wt Readings from Last 4 Encounters:   05/12/23 73.4 kg (161 lb 12.8 oz)   01/17/23 76.6 kg (168 lb 12.8 oz)   10/12/22 81.4 kg (179 lb 6.4 oz)   07/22/21 76.9 kg (169 lb 9.6 oz)      Body mass index is 26.92 kg/m .  Goal BMI under 30, best is under 25.      -- Trying to exercise daily (goal at least 20 min/day) with moderate aerobic activity   -- Eat healthy (resources from ADA at http://www.diabetes.org/)   -- Taking good care of my feet. Consider seeing the Podiatrist   -- Check blood sugars as directed, record in log book and bring to every appointment    Insurance companies are grading you and I on your blood sugar control -- Goal is to get your A1c down to 7.9% or lower and NO Smoking!  -- Medicare and most insurance companies, will only cover Hemoglobin A1c labs to be rechecked every 91+ days.      Return for Diabetes labs and clinic follow-up appointment every 3 to 4 months.    Schedule lab only appointment --- A  few days AFTER: 8/10/23   Schedule clinic appointment with Dr. Bentley -- Same day as labs, or 1-2 days later.

## 2023-05-12 NOTE — NURSING NOTE
"Chief Complaint   Patient presents with     Diabetes         Initial /74 (BP Location: Right arm, Patient Position: Sitting, Cuff Size: Adult Regular)   Pulse 70   Temp 98  F (36.7  C) (Temporal)   Resp 20   Wt 73.4 kg (161 lb 12.8 oz)   SpO2 97%   BMI 26.92 kg/m   Estimated body mass index is 26.92 kg/m  as calculated from the following:    Height as of 1/17/23: 1.651 m (5' 5\").    Weight as of this encounter: 73.4 kg (161 lb 12.8 oz).       FOOD SECURITY SCREENING QUESTIONS:    The next two questions are to help us understand your food security.  If you are feeling you need any assistance in this area, we have resources available to support you today.    Hunger Vital Signs:  Within the past 12 months we worried whether our food would run out before we got money to buy more. Never  Within the past 12 months the food we bought just didn't last and we didn't have money to get more. Never    Advance Care Directive on file? no      Medication reconciliation complete.      Livan Jung,on 5/12/2023 at 9:17 AM        "

## 2023-05-12 NOTE — PROGRESS NOTES
Assessment & Plan     ICD-10-CM    1. Controlled type 2 diabetes mellitus with stage 2 chronic kidney disease, without long-term current use of insulin (H)  E11.22 Semaglutide, 1 MG/DOSE, (OZEMPIC, 1 MG/DOSE,) 4 MG/3ML pen    N18.2       2. Benign essential hypertension  I10 metoprolol succinate ER (TOPROL XL) 50 MG 24 hr tablet      3. Mixed hyperlipidemia  E78.2 rosuvastatin (CRESTOR) 5 MG tablet      4. Overweight (BMI 25.0-29.9)  E66.3 Semaglutide, 1 MG/DOSE, (OZEMPIC, 1 MG/DOSE,) 4 MG/3ML pen      5. Environmental allergies  Z91.09 fexofenadine (ALLEGRA) 60 MG tablet      6. Rosacea  L71.9 doxycycline monohydrate (MONODOX) 100 MG capsule      Patient presents for diabetes follow-up as well as follow-up multiple issues.    Currently using semaglutide injections, this has been very effective.  Denies hypoglycemia.  Needs refills.    Stage II chronic kidney disease.  Kidney function has been slowly declining.  Recommend NSAID avoidance.      Environmental allergies.  Ongoing.  Start trial of Allegra.  Prescription sent to pharmacy.    Acne rosacea.  Ongoing.  Doxycycline has been helpful.  Needs refills.    HYPERTENSION - Ongoing. Blood pressure is currently well controlled.  Medication side effects: None. Denies syncope or presyncope.  No changes for now. Continue - Metoprolol.   Medication list reviewed/updated. Refills completed as needed.      MIXED HYPERLIPIDEMIA.  Ongoing. LDL is at goal: No. Triglycerides are at goal: Yes.  Hopefully lifestyle modifications will improve cholesterol levels, otherwise we will need to consider additional medication dose adjustments or medication changes.  Medication side effects reported: None.   Increase Crestor to 5 mg -- DAILY USE. Medication list reviewed/updated. Refills completed as needed.  Recent Labs   Lab Test 05/12/23  0838 01/17/23  0927   CHOL 157 147   HDL 47* 49*   LDL 93 78   TRIG 84 100      Overweight - Ongoing. Discussed need for reduced oral caloric intake,  weight loss, regular exercise and reduce carbohydrate/sugar intake.      Encouraged regular exercise.     Return in about 3 months (around 8/12/2023) for - Labs every 91+ days, with DM Follow-up, Same Day or 1-2 days later with Dr. Bentley.    Alexander Bentley MD  [facility]   Margie is a 67 year old, presenting for the following health issues:  Diabetes        5/12/2023     9:12 AM   Additional Questions   Roomed by Livan CROWE / LILLIANA   Accompanied by n/a     History of Present Illness       Diabetes:   She presents for follow up of diabetes.  She is checking home blood glucose one time daily. She checks blood glucose before meals.  Blood glucose is never over 200 and never under 70. She is aware of hypoglycemia symptoms including shakiness and weakness. She has no concerns regarding her diabetes at this time.  She is not experiencing numbness or burning in feet, excessive thirst, blurry vision, weight changes or redness, sores or blisters on feet. The patient has had a diabetic eye exam in the last 12 months. Eye exam performed on 06/21/2023. Location of last eye exam [facility].        She eats 2-3 servings of fruits and vegetables daily.She consumes 0 sweetened beverage(s) daily.She exercises with enough effort to increase her heart rate 20 to 29 minutes per day.  She exercises with enough effort to increase her heart rate 4 days per week.   She is taking medications regularly.     Review of Systems   Constitutional: Negative for chills and fever.   HENT: Negative for congestion and hearing loss.    Eyes: Negative for visual disturbance.   Respiratory: Negative for cough, shortness of breath and wheezing.    Cardiovascular: Positive for palpitations. Negative for chest pain.   Gastrointestinal: Positive for heartburn. Negative for abdominal pain, constipation, diarrhea, hematochezia, nausea and vomiting.   Endocrine: Negative for cold intolerance and heat intolerance.    Breasts:  Negative for tenderness, breast mass and discharge.   Genitourinary: Negative for dysuria, hematuria, pelvic pain, vaginal bleeding and vaginal discharge.   Musculoskeletal: Negative for arthralgias and myalgias.   Skin: Negative for rash and wound.   Allergic/Immunologic: Negative for immunocompromised state.   Neurological: Negative for dizziness and light-headedness.   Hematological: Does not bruise/bleed easily.   Psychiatric/Behavioral: Negative for agitation and confusion.          Objective    /74 (BP Location: Right arm, Patient Position: Sitting, Cuff Size: Adult Regular)   Pulse 70   Temp 98  F (36.7  C) (Temporal)   Resp 20   Wt 73.4 kg (161 lb 12.8 oz)   SpO2 97%   BMI 26.92 kg/m    Body mass index is 26.92 kg/m .  Physical Exam  Constitutional:       General: She is not in acute distress.     Appearance: She is well-developed. She is not diaphoretic.   HENT:      Head: Normocephalic and atraumatic.   Eyes:      General: No scleral icterus.     Conjunctiva/sclera: Conjunctivae normal.   Neck:      Vascular: No carotid bruit.   Cardiovascular:      Rate and Rhythm: Normal rate and regular rhythm.      Pulses: Normal pulses.   Pulmonary:      Effort: Pulmonary effort is normal.      Breath sounds: Normal breath sounds.   Abdominal:      Palpations: Abdomen is soft.      Tenderness: There is no abdominal tenderness.   Musculoskeletal:         General: No deformity.      Cervical back: Neck supple.      Right lower leg: No edema.      Left lower leg: No edema.   Lymphadenopathy:      Cervical: No cervical adenopathy.   Skin:     General: Skin is warm and dry.      Coloration: Skin is not jaundiced.      Findings: No rash.   Neurological:      General: No focal deficit present.      Mental Status: She is alert. Mental status is at baseline.   Psychiatric:         Mood and Affect: Mood normal.         Behavior: Behavior normal.        Recent Labs   Lab Test 05/12/23  0838 01/17/23  09  10/12/22  1109   A1C 6.3* 6.6* 7.8*   LDL 93 78 90   HDL 47* 49* 51   TRIG 84 100 81   ALT 15 21 35   CR 0.84 0.79 0.81   GFRESTIMATED 75 81 78   POTASSIUM 4.1 4.0 3.9   TSH 1.35 2.10 1.41   WBC 4.8 5.9 5.7   HGB 15.3 15.6 14.4    214 210   ALBUMIN 3.9 4.0 3.9     Hemoglobin A1c is well controlled.  LDL is high and not at goal.  HDL is normal.  Triglycerides are within goal range.  ALT normal.  Creatinine is at baseline.  Potassium normal.  TSH normal.  CBC normal.  Albumin normal

## 2023-11-29 DIAGNOSIS — I10 BENIGN ESSENTIAL HYPERTENSION: ICD-10-CM

## 2023-11-29 RX ORDER — METOPROLOL SUCCINATE 50 MG/1
50 TABLET, EXTENDED RELEASE ORAL EVERY EVENING
Qty: 90 TABLET | Refills: 1 | Status: SHIPPED | OUTPATIENT
Start: 2023-11-29 | End: 2024-04-09

## 2023-11-29 NOTE — TELEPHONE ENCOUNTER
[facility] sent Rx request for the following:      Requested Prescriptions   Pending Prescriptions Disp Refills    metoprolol succinate ER (TOPROL XL) 50 MG 24 hr tablet [Pharmacy Med Name: metoprolol succinate ER 50 mg tablet,extended release 24 hr] 90 tablet 1     Sig: Take 1 tablet (50 mg) by mouth every evening       Last Prescription Date:   5/12/23  Last Fill Qty/Refills:         90, R-1    Last Office Visit:              5/12/23   Future Office visit:           none    Prescription approved per [facility] Refill Protocol.  Aury Polanco RN on 11/29/2023 at 2:29 PM

## 2024-01-09 ENCOUNTER — OFFICE VISIT (OUTPATIENT)
Dept: FAMILY MEDICINE | Facility: OTHER | Age: 69
End: 2024-01-09
Attending: STUDENT IN AN ORGANIZED HEALTH CARE EDUCATION/TRAINING PROGRAM
Payer: COMMERCIAL

## 2024-01-09 VITALS
WEIGHT: 169.3 LBS | HEIGHT: 65 IN | DIASTOLIC BLOOD PRESSURE: 88 MMHG | BODY MASS INDEX: 28.21 KG/M2 | RESPIRATION RATE: 14 BRPM | OXYGEN SATURATION: 99 % | SYSTOLIC BLOOD PRESSURE: 136 MMHG | TEMPERATURE: 96.4 F | HEART RATE: 75 BPM

## 2024-01-09 DIAGNOSIS — J35.8 ASYMMETRY OF TONSILS: ICD-10-CM

## 2024-01-09 DIAGNOSIS — J03.90 TONSILLITIS: Primary | ICD-10-CM

## 2024-01-09 PROCEDURE — G0463 HOSPITAL OUTPT CLINIC VISIT: HCPCS

## 2024-01-09 PROCEDURE — 99213 OFFICE O/P EST LOW 20 MIN: CPT | Performed by: STUDENT IN AN ORGANIZED HEALTH CARE EDUCATION/TRAINING PROGRAM

## 2024-01-09 RX ORDER — PREDNISONE 20 MG/1
40 TABLET ORAL DAILY
Qty: 10 TABLET | Refills: 0 | Status: SHIPPED | OUTPATIENT
Start: 2024-01-09 | End: 2024-01-09

## 2024-01-09 RX ORDER — PREDNISONE 20 MG/1
40 TABLET ORAL DAILY
Qty: 10 TABLET | Refills: 0 | Status: SHIPPED | OUTPATIENT
Start: 2024-01-09 | End: 2024-04-09

## 2024-01-09 ASSESSMENT — PAIN SCALES - GENERAL: PAINLEVEL: MODERATE PAIN (5)

## 2024-01-09 NOTE — NURSING NOTE
"Chief Complaint   Patient presents with    Respiratory Problems     Congestion; started Friday; taking Ibuprofen; did not do a covid test     Pharyngitis    Fever       Initial /88   Pulse 75   Temp (!) 96.4  F (35.8  C) (Tympanic)   Resp 14   Ht 1.651 m (5' 5\")   Wt 76.8 kg (169 lb 4.8 oz)   SpO2 99%   Breastfeeding No   BMI 28.17 kg/m   Estimated body mass index is 28.17 kg/m  as calculated from the following:    Height as of this encounter: 1.651 m (5' 5\").    Weight as of this encounter: 76.8 kg (169 lb 4.8 oz).  Medication Review: complete    The next two questions are to help us understand your food security.  If you are feeling you need any assistance in this area, we have resources available to support you today.          1/9/2024   SDOH- Food Insecurity   Within the past 12 months, did you worry that your food would run out before you got money to buy more? N   Within the past 12 months, did the food you bought just not last and you didn t have money to get more? N         Health Care Directive:  Patient has a Health Care Directive on file      Georgia Thapa CMA        "

## 2024-01-09 NOTE — PATIENT INSTRUCTIONS
Tonsillar Infection    Augmentin twice  a day for 10 days, take with food, use probiotics.     Prednisone 40 mg once a day for five days, take with food.    Continue OTC medications, tylenol.     Ibuprofen in the afternoon/evening.     Lots of fluids/rest.    Go to ER if symptoms worsen.

## 2024-01-09 NOTE — PROGRESS NOTES
Assessment & Plan     (J03.90) Tonsillitis  (primary encounter diagnosis)    Comment: Abrupt onset tonsillary asymmetry with URI symptoms.  Uvula is not shifted, ample airway for breathing at this time.  She describes tonsil not continuing to get larger at this time.  Discussed further evaluation including CT scan to image the tonsil versus trial of treatment.  She would prefer to trial treatment with close return precautions.    Plan: amoxicillin-clavulanate (AUGMENTIN) 875-125 MG         tablet, predniSONE (DELTASONE) 20 MG tablet,         DISCONTINUED: amoxicillin-clavulanate         (AUGMENTIN) 875-125 MG tablet, DISCONTINUED:         predniSONE (DELTASONE) 20 MG tablet          Augmentin twice a day for 10 days as well as prednisone 40 mg once a day for 5 days.  Continue over-the-counter management as well.  Follow-up with primary care for persisting symptoms.  Return to rapid clinic or close return precautions to the ER if symptoms worsen or change.  She is comfortable and agreeable with this plan.    (J35.8) Asymmetry of tonsils    Comment: Abrupt onset asymmetry of tonsils, consider peritonsillar abscess.  No difficulty with breathing or swallowing.    Plan: Attempt outpatient management with close return precautions.  See above.        Wendy Jiménez PA-C  [facility]    Mikayla Hanson is a 68 year old, presenting for the following health issues:  Respiratory Problems (Congestion; started Friday; taking Ibuprofen; did not do a covid test ), Pharyngitis, and Fever      HPI    Patient presents today with worsening sore throat.  She states that the right side of her throat is more swollen and painful than the left side.  She states she noted a couple days ago that it became swollen though symptoms have been going on for about 5 days.  It has not continued to get more swollen since then.  She has also had nasal congestion, intermittent fever.  She has been using ibuprofen,  "lozenges, and gargling with salt water.  She notes her son did have similar symptoms and it did take a couple of weeks to resolve.      Review of Systems   Constitutional, HEENT, cardiovascular, pulmonary, gi and gu systems are negative, except as otherwise noted.        Objective    /88   Pulse 75   Temp (!) 96.4  F (35.8  C) (Tympanic)   Resp 14   Ht 1.651 m (5' 5\")   Wt 76.8 kg (169 lb 4.8 oz)   SpO2 99%   Breastfeeding No   BMI 28.17 kg/m    Body mass index is 28.17 kg/m .    Physical Exam   GENERAL: healthy, alert and no distress  EYES: Eyes grossly normal to inspection, PERRL and conjunctivae and sclerae normal  HENT: ear canals and TM's normal, nose clear, posterior pharynx with right tonsil 2+, slight firmness in appearance of the right anterior pillar, uvula is midline, pharynx open, left tonsil not visualized  NECK: no adenopathy, no asymmetry, masses, or scars and thyroid normal to palpation  RESP: lungs clear to auscultation - no rales, rhonchi or wheezes  CV: regular rate and rhythm, normal S1 S2  MS: no gross musculoskeletal defects noted, no edema              "

## 2024-02-22 DIAGNOSIS — I10 BENIGN ESSENTIAL HYPERTENSION: ICD-10-CM

## 2024-02-22 DIAGNOSIS — N18.2 CONTROLLED TYPE 2 DIABETES MELLITUS WITH STAGE 2 CHRONIC KIDNEY DISEASE, WITHOUT LONG-TERM CURRENT USE OF INSULIN (H): ICD-10-CM

## 2024-02-22 DIAGNOSIS — R60.0 BILATERAL LOWER EXTREMITY EDEMA: ICD-10-CM

## 2024-02-22 DIAGNOSIS — E66.3 OVERWEIGHT (BMI 25.0-29.9): ICD-10-CM

## 2024-02-22 DIAGNOSIS — E11.22 CONTROLLED TYPE 2 DIABETES MELLITUS WITH STAGE 2 CHRONIC KIDNEY DISEASE, WITHOUT LONG-TERM CURRENT USE OF INSULIN (H): ICD-10-CM

## 2024-02-23 RX ORDER — EMPAGLIFLOZIN 10 MG/1
10 TABLET, FILM COATED ORAL DAILY
Qty: 90 TABLET | Refills: 1 | Status: SHIPPED | OUTPATIENT
Start: 2024-02-23 | End: 2024-04-09

## 2024-02-23 NOTE — TELEPHONE ENCOUNTER
[facility] sent Rx request for the following:      Requested Prescriptions   Pending Prescriptions Disp Refills    JARDIANCE 10 MG TABS tablet [Pharmacy Med Name: Jardiance 10 mg tablet] 90 tablet 1     Sig: Take 1 tablet (10 mg) by mouth daily       Sodium Glucose Co-Transport Inhibitor Agents Failed - 2/22/2024  8:01 AM        Failed - Patient has documented A1c within the specified period of time.     If HgbA1C is 8 or greater, it needs to be on file within the past 3 months.  If less than 8, must be on file within the past 6 months.     Recent Labs   Lab Test 05/12/23  0838   A1C 6.3*            Last Prescription Date:   1/17/23  Last Fill Qty/Refills:         90, R-1    Last Office Visit:              5/12/23   Future Office visit:           4/5/24    Routing refill request to provider for review/approval because:  Drug not on the [facility] refill protocol     Barby Pascal RN on 2/23/2024 at 3:55 PM

## 2024-04-09 ENCOUNTER — OFFICE VISIT (OUTPATIENT)
Dept: INTERNAL MEDICINE | Facility: OTHER | Age: 69
End: 2024-04-09
Attending: INTERNAL MEDICINE
Payer: COMMERCIAL

## 2024-04-09 ENCOUNTER — LAB (OUTPATIENT)
Dept: LAB | Facility: OTHER | Age: 69
End: 2024-04-09
Attending: INTERNAL MEDICINE
Payer: COMMERCIAL

## 2024-04-09 VITALS
WEIGHT: 164 LBS | BODY MASS INDEX: 28 KG/M2 | HEART RATE: 82 BPM | SYSTOLIC BLOOD PRESSURE: 132 MMHG | HEIGHT: 64 IN | TEMPERATURE: 97.1 F | OXYGEN SATURATION: 98 % | DIASTOLIC BLOOD PRESSURE: 86 MMHG | RESPIRATION RATE: 16 BRPM

## 2024-04-09 DIAGNOSIS — I10 BENIGN ESSENTIAL HYPERTENSION: ICD-10-CM

## 2024-04-09 DIAGNOSIS — E78.2 MIXED HYPERLIPIDEMIA: ICD-10-CM

## 2024-04-09 DIAGNOSIS — E11.22 CONTROLLED TYPE 2 DIABETES MELLITUS WITH STAGE 2 CHRONIC KIDNEY DISEASE, WITHOUT LONG-TERM CURRENT USE OF INSULIN (H): ICD-10-CM

## 2024-04-09 DIAGNOSIS — R60.0 BILATERAL LOWER EXTREMITY EDEMA: ICD-10-CM

## 2024-04-09 DIAGNOSIS — E66.3 OVERWEIGHT (BMI 25.0-29.9): ICD-10-CM

## 2024-04-09 DIAGNOSIS — Z91.09 ENVIRONMENTAL ALLERGIES: ICD-10-CM

## 2024-04-09 DIAGNOSIS — N18.2 CONTROLLED TYPE 2 DIABETES MELLITUS WITH STAGE 2 CHRONIC KIDNEY DISEASE, WITHOUT LONG-TERM CURRENT USE OF INSULIN (H): ICD-10-CM

## 2024-04-09 DIAGNOSIS — E11.22 CONTROLLED TYPE 2 DIABETES MELLITUS WITH STAGE 2 CHRONIC KIDNEY DISEASE, WITHOUT LONG-TERM CURRENT USE OF INSULIN (H): Primary | ICD-10-CM

## 2024-04-09 DIAGNOSIS — N18.2 CONTROLLED TYPE 2 DIABETES MELLITUS WITH STAGE 2 CHRONIC KIDNEY DISEASE, WITHOUT LONG-TERM CURRENT USE OF INSULIN (H): Primary | ICD-10-CM

## 2024-04-09 DIAGNOSIS — L71.9 ROSACEA: ICD-10-CM

## 2024-04-09 DIAGNOSIS — Z71.85 VACCINE COUNSELING: ICD-10-CM

## 2024-04-09 LAB
ALBUMIN SERPL BCG-MCNC: 4.4 G/DL (ref 3.5–5.2)
ALP SERPL-CCNC: 114 U/L (ref 40–150)
ALT SERPL W P-5'-P-CCNC: 22 U/L (ref 0–50)
ANION GAP SERPL CALCULATED.3IONS-SCNC: 14 MMOL/L (ref 7–15)
AST SERPL W P-5'-P-CCNC: 30 U/L (ref 0–45)
BILIRUB SERPL-MCNC: 1.2 MG/DL
BUN SERPL-MCNC: 19.4 MG/DL (ref 8–23)
CALCIUM SERPL-MCNC: 9.9 MG/DL (ref 8.8–10.2)
CHLORIDE SERPL-SCNC: 97 MMOL/L (ref 98–107)
CHOLEST SERPL-MCNC: 161 MG/DL
CREAT SERPL-MCNC: 0.93 MG/DL (ref 0.51–0.95)
DEPRECATED HCO3 PLAS-SCNC: 25 MMOL/L (ref 22–29)
EGFRCR SERPLBLD CKD-EPI 2021: 66 ML/MIN/1.73M2
ERYTHROCYTE [DISTWIDTH] IN BLOOD BY AUTOMATED COUNT: 12.4 % (ref 10–15)
FASTING STATUS PATIENT QL REPORTED: YES
GLUCOSE SERPL-MCNC: 136 MG/DL (ref 70–99)
HBA1C MFR BLD: 7.1 % (ref 4–6.2)
HCT VFR BLD AUTO: 49.3 % (ref 35–47)
HDLC SERPL-MCNC: 48 MG/DL
HGB BLD-MCNC: 16.9 G/DL (ref 11.7–15.7)
LDLC SERPL CALC-MCNC: 93 MG/DL
MCH RBC QN AUTO: 30.2 PG (ref 26.5–33)
MCHC RBC AUTO-ENTMCNC: 34.3 G/DL (ref 31.5–36.5)
MCV RBC AUTO: 88 FL (ref 78–100)
NONHDLC SERPL-MCNC: 113 MG/DL
PLATELET # BLD AUTO: 240 10E3/UL (ref 150–450)
POTASSIUM SERPL-SCNC: 3.6 MMOL/L (ref 3.4–5.3)
PROT SERPL-MCNC: 9 G/DL (ref 6.4–8.3)
RBC # BLD AUTO: 5.6 10E6/UL (ref 3.8–5.2)
SODIUM SERPL-SCNC: 136 MMOL/L (ref 135–145)
TRIGL SERPL-MCNC: 102 MG/DL
TSH SERPL DL<=0.005 MIU/L-ACNC: 1.84 UIU/ML (ref 0.3–4.2)
WBC # BLD AUTO: 6.7 10E3/UL (ref 4–11)

## 2024-04-09 PROCEDURE — 36415 COLL VENOUS BLD VENIPUNCTURE: CPT | Mod: ZL

## 2024-04-09 PROCEDURE — 84443 ASSAY THYROID STIM HORMONE: CPT | Mod: ZL

## 2024-04-09 PROCEDURE — 80053 COMPREHEN METABOLIC PANEL: CPT | Mod: ZL

## 2024-04-09 PROCEDURE — 83036 HEMOGLOBIN GLYCOSYLATED A1C: CPT | Mod: ZL

## 2024-04-09 PROCEDURE — 80061 LIPID PANEL: CPT | Mod: ZL

## 2024-04-09 PROCEDURE — G2211 COMPLEX E/M VISIT ADD ON: HCPCS | Performed by: INTERNAL MEDICINE

## 2024-04-09 PROCEDURE — 85014 HEMATOCRIT: CPT | Mod: ZL

## 2024-04-09 PROCEDURE — 99214 OFFICE O/P EST MOD 30 MIN: CPT | Performed by: INTERNAL MEDICINE

## 2024-04-09 PROCEDURE — G0463 HOSPITAL OUTPT CLINIC VISIT: HCPCS

## 2024-04-09 RX ORDER — LANCETS 33 GAUGE
1 EACH MISCELLANEOUS DAILY
Qty: 100 EACH | Refills: 3 | Status: SHIPPED | OUTPATIENT
Start: 2024-04-09

## 2024-04-09 RX ORDER — METOPROLOL SUCCINATE 50 MG/1
50 TABLET, EXTENDED RELEASE ORAL EVERY EVENING
Qty: 90 TABLET | Refills: 4 | Status: SHIPPED | OUTPATIENT
Start: 2024-04-09

## 2024-04-09 RX ORDER — FEXOFENADINE HCL 60 MG/1
60 TABLET, FILM COATED ORAL 2 TIMES DAILY PRN
Qty: 180 TABLET | Refills: 4 | Status: SHIPPED | OUTPATIENT
Start: 2024-04-09

## 2024-04-09 RX ORDER — ROSUVASTATIN CALCIUM 5 MG/1
5 TABLET, COATED ORAL DAILY
Qty: 90 TABLET | Refills: 4 | Status: SHIPPED | OUTPATIENT
Start: 2024-04-09

## 2024-04-09 RX ORDER — DOXYCYCLINE 100 MG/1
100 CAPSULE ORAL DAILY PRN
Qty: 90 CAPSULE | Refills: 4 | Status: SHIPPED | OUTPATIENT
Start: 2024-04-09

## 2024-04-09 ASSESSMENT — ENCOUNTER SYMPTOMS
CONFUSION: 0
CHILLS: 0
HEMATURIA: 0
NAUSEA: 0
ABDOMINAL PAIN: 0
BRUISES/BLEEDS EASILY: 0
ARTHRALGIAS: 0
LIGHT-HEADEDNESS: 0
COUGH: 0
VOMITING: 0
SHORTNESS OF BREATH: 0
FEVER: 0
DIZZINESS: 0
PALPITATIONS: 1
DIARRHEA: 0
WHEEZING: 0
BREAST MASS: 0
CONSTIPATION: 0
HEARTBURN: 1
MYALGIAS: 0
AGITATION: 0
HEMATOCHEZIA: 0
WOUND: 0
DYSURIA: 0

## 2024-04-09 ASSESSMENT — PAIN SCALES - GENERAL: PAINLEVEL: NO PAIN (0)

## 2024-04-09 NOTE — NURSING NOTE
"Chief Complaint   Patient presents with    Diabetes    Hypertension       Initial /86   Pulse 82   Temp 97.1  F (36.2  C)   Resp 16   Ht 1.626 m (5' 4\")   Wt 74.4 kg (164 lb)   SpO2 98%   Breastfeeding No   BMI 28.15 kg/m   Estimated body mass index is 28.15 kg/m  as calculated from the following:    Height as of this encounter: 1.626 m (5' 4\").    Weight as of this encounter: 74.4 kg (164 lb).  Medication Review: complete    The next two questions are to help us understand your food security.  If you are feeling you need any assistance in this area, we have resources available to support you today.          1/9/2024   SDOH- Food Insecurity   Within the past 12 months, did you worry that your food would run out before you got money to buy more? N   Within the past 12 months, did the food you bought just not last and you didn t have money to get more? N         Health Care Directive:  Patient has a Health Care Directive on file      Mandi Sawyer LPN      "

## 2024-04-09 NOTE — PROGRESS NOTES
Assessment & Plan     ICD-10-CM    1. Controlled type 2 diabetes mellitus with stage 2 chronic kidney disease, without long-term current use of insulin (H)  E11.22 blood glucose (NO BRAND SPECIFIED) test strip    N18.2 empagliflozin (JARDIANCE) 10 MG TABS tablet     blood glucose (NO BRAND SPECIFIED) test strip      2. Benign essential hypertension  I10 empagliflozin (JARDIANCE) 10 MG TABS tablet     metoprolol succinate ER (TOPROL XL) 50 MG 24 hr tablet      3. Mixed hyperlipidemia  E78.2 rosuvastatin (CRESTOR) 5 MG tablet      4. Overweight (BMI 25.0-29.9)  E66.3 empagliflozin (JARDIANCE) 10 MG TABS tablet      5. Rosacea  L71.9 doxycycline monohydrate (MONODOX) 100 MG capsule      6. Environmental allergies  Z91.09 fexofenadine (ALLEGRA) 60 MG tablet      7. Bilateral lower extremity edema  R60.0 empagliflozin (JARDIANCE) 10 MG TABS tablet      8. Vaccine counseling  Z71.85          Patient presents for follow-up multiple issues.    Overweight, start daily Jardiance.  Needs updated prescriptions.  Has only been using intermittently.  She was trying to use Ozempic but got a rash on the face, significant nausea.  Subsequently stopped using.  Overall weight is stable.  Has not taken any medication for the last month.    Rosacea, currently controlled.  Intermittently using doxycycline.  Needs refills.  Tolerating well, other than some GI issues intermittently.    Environmental allergies, chronic, stable.  Using Allegra as needed.  Refill sent to pharmacy.    Intermittent lower extremity edema, recommend daily Jardiance as noted.  Intermittently using triamterene-hydrochlorothiazide.  Advised that when she starts the daily Jardiance she likely will not need any additional diuretics.    HYPERTENSION - Ongoing. Blood pressure is currently well controlled.  Medication side effects: None. Denies syncope or presyncope.  Continue current medications.   Medication list reviewed/updated. Refills completed as needed.       MIXED HYPERLIPIDEMIA.  Ongoing. LDL is at goal: No. Triglycerides are at goal: Yes.  Hopefully lifestyle modifications will improve cholesterol levels, otherwise will consider additional medication dose adjustments or medication changes.  Medication side effects reported: None.   Continue current medications for now. Medication list reviewed/updated. Refills completed as needed.  Recent Labs   Lab Test 04/09/24  0815 05/12/23  0838   CHOL 161 157   HDL 48* 47*   LDL 93 93   TRIG 102 84        Chronic Kidney Disease, Stage 2 (GFR 60-89), chronic, ongoing.  Kidney function had been slowly declining.  Encourage NSAID avoidance.       Vaccine counseling completed.  Encourage routine / annual vaccinations.    Type 2 Diabetes Mellitus, with nephropathy.  Blood sugar control has been good with minimal hyperglycemia. Doing well with diet, oral agents, and exercise.  Medication list reviewed/updated. Refills completed as needed.    Complicating factors include but are not limited to: hypertension, hyperlipidemia, and chronic kidney disease.     Recent Labs   Lab Test 04/09/24  0815 05/12/23  0838 01/17/23  0927   A1C 7.1* 6.3* 6.6*   LDL 93 93 78   HDL 48* 47* 49*   TRIG 102 84 100   ALT 22 15 21   CR 0.93 0.84 0.79   GFRESTIMATED 66 75 81   POTASSIUM 3.6 4.1 4.0   TSH 1.84 1.35 2.10   WBC 6.7 4.8 5.9   HGB 16.9* 15.3 15.6    189 214   ALBUMIN 4.4 3.9 4.0     Hemoglobin A1c is up from previous but currently controlled.  LDL is high.  She will triglycerides are at goal.  ALT normal.  Creatinine is at baseline.  Potassium normal.  TSH normal.  CBC shows high hemoglobin, platelet count and white blood cell count are normal.  Albumin normal.      Vaccine counseling completed.  Encouraged tetanus shot, shingles vaccination, RSV vaccination at pharmacy.  She plans to get tetanus shot today      The longitudinal plan of care for the diagnosis(es)/condition(s) as documented were addressed during this visit. Due to the added  "complexity in care, I will continue to support Margie in the subsequent management and with ongoing continuity of care.            BMI  Estimated body mass index is 28.15 kg/m  as calculated from the following:    Height as of this encounter: 1.626 m (5' 4\").    Weight as of this encounter: 74.4 kg (164 lb).         Return in about 4 months (around 8/1/2024) for Annual Medicare Wellness Visit, + Get Diabetic labs prior to clinic appointment.      Alexander Bentley MD  [facility]    Review of Systems   Constitutional:  Negative for chills and fever.   HENT:  Negative for congestion and hearing loss.    Eyes:  Negative for visual disturbance.   Respiratory:  Negative for cough, shortness of breath and wheezing.    Cardiovascular:  Positive for palpitations. Negative for chest pain.   Gastrointestinal:  Positive for heartburn. Negative for abdominal pain, constipation, diarrhea, hematochezia, nausea and vomiting.   Endocrine: Negative for cold intolerance and heat intolerance.   Breasts:  Negative for tenderness, breast mass and discharge.   Genitourinary:  Negative for dysuria, hematuria, pelvic pain, vaginal bleeding and vaginal discharge.   Musculoskeletal:  Negative for arthralgias and myalgias.   Skin:  Positive for rash (Rosacea rash intermittently). Negative for wound.   Allergic/Immunologic: Negative for immunocompromised state.   Neurological:  Negative for dizziness and light-headedness.   Hematological:  Does not bruise/bleed easily.   Psychiatric/Behavioral:  Negative for agitation and confusion.          Subjective   Margie is a 68 year old, presenting for the following health issues:  Diabetes and Hypertension        4/9/2024     8:31 AM   Additional Questions   Roomed by Bala Sawyer LPN     Via the Health Maintenance questionnaire, the patient has reported the following services have been completed -Eye Exam, this information has been sent to the abstraction team.  History of Present " "Illness       Diabetes:   She presents for follow up of diabetes.  She is checking home blood glucose a few times a week.   She checks blood glucose before and after meals.  Blood glucose is sometimes over 200 and never under 70. She is aware of hypoglycemia symptoms including shakiness and weakness.   She is concerned about other.    She is not experiencing numbness or burning in feet, excessive thirst, blurry vision, weight changes or redness, sores or blisters on feet. The patient has had a diabetic eye exam in the last 12 months. Eye exam performed on 4/2023. Location of last eye exam [facility].        Hypertension: She presents for follow up of hypertension.  She does not check blood pressure  regularly outside of the clinic. Outpatient blood pressures have not been over 140/90. She follows a low salt diet.     She eats 2-3 servings of fruits and vegetables daily.She consumes 0 sweetened beverage(s) daily.She exercises with enough effort to increase her heart rate 20 to 29 minutes per day.  She exercises with enough effort to increase her heart rate 6 days per week. She is missing 4 dose(s) of medications per week.  She is not taking prescribed medications regularly due to side effects.                     Objective    /86   Pulse 82   Temp 97.1  F (36.2  C)   Resp 16   Ht 1.626 m (5' 4\")   Wt 74.4 kg (164 lb)   SpO2 98%   Breastfeeding No   BMI 28.15 kg/m    Body mass index is 28.15 kg/m .  Physical Exam  Constitutional:       General: She is not in acute distress.     Appearance: She is well-developed. She is not diaphoretic.   HENT:      Head: Normocephalic and atraumatic.   Eyes:      General: No scleral icterus.     Conjunctiva/sclera: Conjunctivae normal.   Neck:      Vascular: No carotid bruit.   Cardiovascular:      Rate and Rhythm: Normal rate and regular rhythm.      Pulses: Normal pulses.   Pulmonary:      Effort: Pulmonary effort is normal.      Breath sounds: Normal " breath sounds.   Abdominal:      Palpations: Abdomen is soft.      Tenderness: There is no abdominal tenderness.   Musculoskeletal:         General: No deformity.      Cervical back: Neck supple.      Right lower leg: No edema.      Left lower leg: No edema.   Lymphadenopathy:      Cervical: No cervical adenopathy.   Skin:     General: Skin is warm and dry.      Coloration: Skin is not jaundiced.      Findings: No rash.   Neurological:      Mental Status: She is alert. Mental status is at baseline.   Psychiatric:         Mood and Affect: Mood normal.         Behavior: Behavior normal.                    Signed Electronically by: Alexander Bentley MD

## 2024-04-09 NOTE — PATIENT INSTRUCTIONS
Blood pressure is well controlled.   Diabetes is well controlled.     Medications refilled.         Cholesterol levels are still high...   Try to take the Crestor 5 mg once daily.... if tolerated.     Labs are otherwise stable.         To help with weight loss and improve blood sugar control....    -- Try to avoid Carbohydrates as much as possible -- breads, pasta, baked goods, cakes, oatmeal, cold cereal, potatoes.   -- Eat more lean meats, proteins, eggs, nuts, vegetables.    -- Start or Continue regular daily exercise.     Get out and exercise, bike ride, walk for 10 to 15 minutes after each meal -- this can significantly lowers the spike in blood sugar after eating.       Results for orders placed or performed in visit on 04/09/24   Comprehensive metabolic panel     Status: Abnormal   Result Value Ref Range    Sodium 136 135 - 145 mmol/L    Potassium 3.6 3.4 - 5.3 mmol/L    Carbon Dioxide (CO2) 25 22 - 29 mmol/L    Anion Gap 14 7 - 15 mmol/L    Urea Nitrogen 19.4 8.0 - 23.0 mg/dL    Creatinine 0.93 0.51 - 0.95 mg/dL    GFR Estimate 66 >60 mL/min/1.73m2    Calcium 9.9 8.8 - 10.2 mg/dL    Chloride 97 (L) 98 - 107 mmol/L    Glucose 136 (H) 70 - 99 mg/dL    Alkaline Phosphatase 114 40 - 150 U/L    AST 30 0 - 45 U/L    ALT 22 0 - 50 U/L    Protein Total 9.0 (H) 6.4 - 8.3 g/dL    Albumin 4.4 3.5 - 5.2 g/dL    Bilirubin Total 1.2 <=1.2 mg/dL   Lipid Profile     Status: Abnormal   Result Value Ref Range    Cholesterol 161 <200 mg/dL    Triglycerides 102 <150 mg/dL    Direct Measure HDL 48 (L) >=50 mg/dL    LDL Cholesterol Calculated 93 <=100 mg/dL    Non HDL Cholesterol 113 <130 mg/dL    Patient Fasting > 8hrs? Yes     Narrative    Cholesterol  Desirable:  <200 mg/dL    Triglycerides  Normal:  Less than 150 mg/dL  Borderline High:  150-199 mg/dL  High:  200-499 mg/dL  Very High:  Greater than or equal to 500 mg/dL    Direct Measure HDL  Female:  Greater than or equal to 50 mg/dL   Male:  Greater than or equal to 40  mg/dL    LDL Cholesterol  Desirable:  <100mg/dL  Above Desirable:  100-129 mg/dL   Borderline High:  130-159 mg/dL   High:  160-189 mg/dL   Very High:  >= 190 mg/dL    Non HDL Cholesterol  Desirable:  130 mg/dL  Above Desirable:  130-159 mg/dL  Borderline High:  160-189 mg/dL  High:  190-219 mg/dL  Very High:  Greater than or equal to 220 mg/dL   CBC with platelets     Status: Abnormal   Result Value Ref Range    WBC Count 6.7 4.0 - 11.0 10e3/uL    RBC Count 5.60 (H) 3.80 - 5.20 10e6/uL    Hemoglobin 16.9 (H) 11.7 - 15.7 g/dL    Hematocrit 49.3 (H) 35.0 - 47.0 %    MCV 88 78 - 100 fL    MCH 30.2 26.5 - 33.0 pg    MCHC 34.3 31.5 - 36.5 g/dL    RDW 12.4 10.0 - 15.0 %    Platelet Count 240 150 - 450 10e3/uL   Hemoglobin A1c     Status: Abnormal   Result Value Ref Range    Hemoglobin A1C 7.1 (H) 4.0 - 6.2 %       Aspects of Diabetes:   Recent Labs   Lab Test 04/09/24  0815 05/12/23  0838 01/17/23  0927   A1C 7.1* 6.3* 6.6*   LDL 93 93 78   HDL 48* 47* 49*   TRIG 102 84 100   ALT 22 15 21   CR 0.93 0.84 0.79   GFRESTIMATED 66 75 81   POTASSIUM 3.6 4.1 4.0   TSH 1.84 1.35 2.10   WBC 6.7 4.8 5.9   HGB 16.9* 15.3 15.6    189 214   ALBUMIN 4.4 3.9 4.0      Hemoglobin A1c  Goal range is under 8%. Best is 6.5 to 7   Blood Pressure 132/86 Goal to keep less than 140/90   Tobacco  reports that she has never smoked. She has never used smokeless tobacco. Goal to abstain from tobacco   Aspirin or Plavix Anti-platelet therapy Aspirin or Plavix reduces risk of heart disease and stroke  -- sometimes used with other blood thinners, depending on bleeding risk and risk factors.    ACE/ARB Specific blood pressure meds These medications can reduce risk of kidney disease   Cholesterol Statins (Lipitor, Crestor, vs others) Statins reduce risk of heart disease and stroke   Eye Exam -- Do Yearly -- Annual diabetic eye exam   Healthy weight Wt Readings from Last 4 Encounters:   04/09/24 74.4 kg (164 lb)   01/09/24 76.8 kg (169 lb 4.8 oz)    05/12/23 73.4 kg (161 lb 12.8 oz)   01/17/23 76.6 kg (168 lb 12.8 oz)      Body mass index is 28.15 kg/m .  Goal BMI under 30, best is under 25.      -- Trying to exercise daily (goal at least 20 min/day) with moderate aerobic activity   -- Eat healthy (resources from ADA at http://www.diabetes.org/)   -- Taking good care of my feet. Consider seeing the Podiatrist   -- Check blood sugars as directed, record in log book and bring to every appointment    Insurance companies are grading you and I on your blood sugar control -- Goal is to get your A1c down to 7.9% or lower and NO Smoking!  -- Medicare and most insurance companies, will only cover Hemoglobin A1c labs to be rechecked every 91+ days.      Return for Diabetes labs and clinic follow-up appointment every 3 to 4 months.    Schedule lab only appointment --- A few days AFTER: 7/8/24   Schedule clinic appointment with Dr. Bentley -- Same day as labs, or 1-2 days later.

## 2024-06-04 ENCOUNTER — TRANSFERRED RECORDS (OUTPATIENT)
Dept: HEALTH INFORMATION MANAGEMENT | Facility: OTHER | Age: 69
End: 2024-06-04
Payer: COMMERCIAL

## 2024-06-04 LAB — RETINOPATHY: NEGATIVE

## 2024-06-17 PROBLEM — Z76.89 HEALTH CARE HOME: Status: RESOLVED | Noted: 2017-01-11 | Resolved: 2024-06-17

## 2024-10-23 ENCOUNTER — TELEPHONE (OUTPATIENT)
Dept: OBGYN | Facility: OTHER | Age: 69
End: 2024-10-23
Payer: COMMERCIAL

## 2024-10-23 ENCOUNTER — OFFICE VISIT (OUTPATIENT)
Dept: OBGYN | Facility: OTHER | Age: 69
End: 2024-10-23
Attending: NURSE PRACTITIONER
Payer: COMMERCIAL

## 2024-10-23 VITALS
SYSTOLIC BLOOD PRESSURE: 134 MMHG | HEIGHT: 64 IN | BODY MASS INDEX: 27.31 KG/M2 | HEART RATE: 56 BPM | DIASTOLIC BLOOD PRESSURE: 68 MMHG | WEIGHT: 160 LBS

## 2024-10-23 DIAGNOSIS — N89.8 VAGINAL IRRITATION: ICD-10-CM

## 2024-10-23 DIAGNOSIS — R33.9 URINARY RETENTION WITH INCOMPLETE BLADDER EMPTYING: ICD-10-CM

## 2024-10-23 DIAGNOSIS — N39.46 MIXED INCONTINENCE: ICD-10-CM

## 2024-10-23 DIAGNOSIS — L90.0 LICHEN SCLEROSUS: Primary | ICD-10-CM

## 2024-10-23 DIAGNOSIS — R35.0 URINARY FREQUENCY: ICD-10-CM

## 2024-10-23 DIAGNOSIS — B37.31 YEAST INFECTION OF THE VAGINA: ICD-10-CM

## 2024-10-23 DIAGNOSIS — N89.8 VAGINAL ITCHING: ICD-10-CM

## 2024-10-23 LAB
ALBUMIN UR-MCNC: NEGATIVE MG/DL
APPEARANCE UR: CLEAR
BACTERIA #/AREA URNS HPF: ABNORMAL /HPF
BACTERIAL VAGINOSIS VAG-IMP: NEGATIVE
BILIRUB UR QL STRIP: NEGATIVE
CANDIDA DNA VAG QL NAA+PROBE: DETECTED
CANDIDA GLABRATA / CANDIDA KRUSEI DNA: NOT DETECTED
COLOR UR AUTO: ABNORMAL
GLUCOSE UR STRIP-MCNC: >1000 MG/DL
HGB UR QL STRIP: NEGATIVE
KETONES UR STRIP-MCNC: 10 MG/DL
LEUKOCYTE ESTERASE UR QL STRIP: ABNORMAL
MUCOUS THREADS #/AREA URNS LPF: PRESENT /LPF
NITRATE UR QL: NEGATIVE
PH UR STRIP: 5 [PH] (ref 5–9)
RBC URINE: 1 /HPF
SP GR UR STRIP: 1.03 (ref 1–1.03)
SQUAMOUS EPITHELIAL: 1 /HPF
T VAGINALIS DNA VAG QL NAA+PROBE: NOT DETECTED
TRANSITIONAL EPI: <1 /HPF
UROBILINOGEN UR STRIP-MCNC: NORMAL MG/DL
WBC URINE: 4 /HPF

## 2024-10-23 PROCEDURE — G2211 COMPLEX E/M VISIT ADD ON: HCPCS | Performed by: NURSE PRACTITIONER

## 2024-10-23 PROCEDURE — 51798 US URINE CAPACITY MEASURE: CPT | Performed by: NURSE PRACTITIONER

## 2024-10-23 PROCEDURE — 87086 URINE CULTURE/COLONY COUNT: CPT | Mod: ZL | Performed by: NURSE PRACTITIONER

## 2024-10-23 PROCEDURE — 99204 OFFICE O/P NEW MOD 45 MIN: CPT | Performed by: NURSE PRACTITIONER

## 2024-10-23 PROCEDURE — G0463 HOSPITAL OUTPT CLINIC VISIT: HCPCS

## 2024-10-23 PROCEDURE — 0352U MULTIPLEX VAGINAL PANEL BY PCR: CPT | Mod: ZL | Performed by: NURSE PRACTITIONER

## 2024-10-23 PROCEDURE — 81001 URINALYSIS AUTO W/SCOPE: CPT | Mod: ZL | Performed by: NURSE PRACTITIONER

## 2024-10-23 RX ORDER — CLOBETASOL PROPIONATE 0.5 MG/G
OINTMENT TOPICAL 2 TIMES DAILY
Qty: 60 G | Refills: 1 | Status: SHIPPED | OUTPATIENT
Start: 2024-10-23 | End: 2024-11-22

## 2024-10-23 RX ORDER — FLUCONAZOLE 150 MG/1
150 TABLET ORAL ONCE
Qty: 1 TABLET | Refills: 0 | Status: SHIPPED | OUTPATIENT
Start: 2024-10-23 | End: 2024-10-23

## 2024-10-23 ASSESSMENT — PAIN SCALES - GENERAL: PAINLEVEL_OUTOF10: NO PAIN (0)

## 2024-10-23 NOTE — NURSING NOTE
Chief Complaint   Patient presents with    Vaginal Problem     Vulvar Irritation        Medication Reconciliation: complete      Herminia Guzmán LPN........................10/23/2024  8:56 AM

## 2024-10-23 NOTE — PROGRESS NOTES
CONSULT    CHIEF COMPLAINT: vulvar itching and pain.    HPI  Margie Mccoy is a 69 year old , here for for vulvar itching and pain.  Patient states she has had intermittent vaginal flares of irritation for the past 30 years.  Flares would typically present with vaginal itching, pain and redness.  Will occasionally have some vaginal tears from scratching.  Patient denies any change in vaginal discharge or pain with intercourse.  Denies any urinary symptoms.  She typically would get a flare once or twice a year however over the past 2 years she feels her vaginal irritation has been more consistent.  She wonders if she may have lichen sclerosis.  She has been under a great deal of stress over the past year and feels like her symptoms are never completely resolving.    Patient is postmenopausal, went through menopause at age 55.  She has had 2 children delivered vaginally.  Denies any history of abnormal Paps.    Patient has history of urinary incontinence, over the past years, worse over the past 6 months.  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  She will occasionally leak urine when her bladder is full and she has urgency to urinate.  She does not feel like she completely empties her bladder.  She does have urinary frequency but denies any dysuria or hematuria.    Still sexually active and denies any pain with intercourse.  Denies any vaginal bulge.    Patient denies concerns for constipation and describes her bowel movements is very regular, about 5 times a week.    Patient drinks 2 to 3 cups of coffee a day, will occasionally have 0 sugar root beer.  Rarely drinks alcohol, maybe 1-2 times a month.    Patient is traveling to [place] this November and would like to have her symptoms improved by the time she leaves.    No LMP recorded. Patient is postmenopausal.    I have reviewed the nursing notes.  I have reviewed allergies, medication list, problem list, and past medical  "history.    OB HISTORY  OB History    Para Term  AB Living   2 2 2 0 0 2   SAB IAB Ectopic Multiple Live Births   0 0 0 0 2      # Outcome Date GA Lbr Mich/2nd Weight Sex Type Anes PTL Lv   2 Term    3.742 kg (8 lb 4 oz)  Vag-Spont   HOLLEY   1 Term    3.856 kg (8 lb 8 oz)  Vag-Spont   HOLLEY       ROS 10-Point ROS negative except as noted in HPI    PHYSICAL EXAM  /68 (BP Location: Right arm, Patient Position: Sitting, Cuff Size: Adult Large)   Pulse 56   Ht 1.626 m (5' 4\")   Wt 72.6 kg (160 lb)   BMI 27.46 kg/m    Body mass index is 27.46 kg/m .  Gen: Well-appearing, well developed, non toxic  Resp: nonlabored, normal effort, no audible wheezing or cough  GI: soft, nontender, no flank pain  MS: moving without difficulty  Psych: appropriate mood and affect    Pelvic:  Bilateral external labia red, inflamed, edematous with scattered micro abrasion/excoriation.  Labia minora fusion to labia majora.  No tenderness over introitus.  Normal hair distribution. Vagina is without lesions. No discharge. Cervix normal, no lesions, no cervical motion tenderness. Uterus is small, mobile, non-tender. No adnexal tenderness or masses.  Mild urethral mobility.  Grade 1 cystocele.   Chaperone present     PVR: Residual urine by ultrasound was 107 ml.      DIAGNOSTICS  No results found for this or any previous visit (from the past 24 hours).    ASSESSMENT/PLAN  Margie Mccoy is a 69 year old   here for worsening vaginal irritation, concern for possible yeast infection.    1. Vaginal irritation  - Multiplex Vaginal Panel by PCR  30 years of intermittent vaginal irritation and itching however more persistent over the past 1 to 2 years.  Expressed my concern for lichen sclerosis however would also recommend ruling out infection with vaginitis panel.  Patient did test positive for vaginal yeast infection.  Please see treatment plan for lichen sclerosus.    2. Vaginal itching  Patient diagnosed with lichen sclerosis " and vaginal yeast infection.    3. Urinary frequency  - MEASURE POST-VOID RESIDUAL URINE/BLADDER CAPACITY, US NON-IMAGING  - UA with Microscopic reflex to Culture  - Urine Culture    Patient has urinary frequency with incomplete emptying.  She is retaining urine.  Patient has diabetes mellitus type 2 and does have expected glucose in urine with being on Jardiance.  Urinalysis showed moderate amount of leukocyte esterase, negative for nitrates or hematuria.  Recommend urine culture for further evaluation of acute bacterial cystitis.    4. Lichen sclerosus (Primary)  - clobetasol (TEMOVATE) 0.05 % external ointment; Apply topically 2 times daily. Morning and bedtime to external genitalia  Dispense: 60 g; Refill: 1  Patient reports having over 30 years of intermittent vaginal flares including bilateral labia inflammation, itching and pain.  She has noticed some thickening and areas with some tears.  This is never associated with vaginal odor or discharge.  Typically she would only be having these flares 1-2 times a year however this has increased over the past 1 to 2 years and feels more continuous.  Clinical exam consistent with lichen sclerosis.  Review etiology of chronic lichen sclerosis, how this is diagnosed, treated and managed.  I recommend beginning treatment with clobetasol ointment twice daily for the next month with close follow-up.  Today she does not have any open lesions or sores.  If patient is not having improvement of symptoms would recommend proceeding with biopsy for confirmation of diagnosis.    5. Yeast infection of the vagina  - fluconazole (DIFLUCAN) 150 MG tablet; Take 1 tablet (150 mg) by mouth once for 1 dose.  Dispense: 1 tablet; Refill: 0  Recommend ruling out infection with patient's vaginal irritation and itching.  MVP returned positive for vaginal yeast infection.  Prescription for Diflucan 150 mg once sent to pharmacy.    6. Urinary retention with incomplete bladder emptying  With   7.   Mixed urinary incontinence  Patient has had several years of mixed urinary incontinence with stress and urge incontinence, worse over the past 6 months.  She does not feel like she is completely emptying her bladder and does have urinary frequency. PVR today was 107mL.  Education provided to help with incontinence, urinary retention and bladder training.  Reviewed risk factors for urinary retention and incontinence including postmenopause, vaginal delivery and diabetes mellitus.  Recommend avoiding bladder irritants and reducing soda and coffee intake.  She does not have concerns with constipation.  I do think she would benefit from pelvic floor rehab.  Also discuss periurethral and vaginal estrogen at her next follow-up as well.  We can discuss this at her follow-up visit in 1 month.      Follow-up: 1 month follow-up for lichen sclerosus, urinary retention and to discuss pelvic floor rehab    Explanation of diagnosis, treatment options and risk and benefits of medications reviewed with patient/caregiver. Patient/caregiver agrees with plan of care. Red flags symptoms, Strict ER precautions and when to return for follow up were discussed and patient/caregiver.    50 minutes spent on the date of the encounter doing chart review, history and physical exam, counseling and education, documentation and further activities per the note     Liza Winkler NP  [facility]    OB/GYN

## 2024-10-24 NOTE — PATIENT INSTRUCTIONS
Incontinence   Etiologies discussed that can cause incontinence include childbirth and birth trauma, atrophic changes, vaginal prolapse, excessive caffeine use, smoking, constipation, certain medications, previous pelvic surgeries, and other medical conditions such as Diabetes, Parkinson's disease and other neurologic conditions such as MS    Conservative measures such as timed voiding, avoidance of constipation and avoidance of dietary triggers (elimination diet).   -Avoid bladder irritants: Caffeine (coffee, tea, pop, energy drinks), sugary drinks, chocolate, some spicy foods  -Maintain good bowel regimen with MiraLAX or Metamucil.  Long term plan of preventing constipation, not just taking once constipated. Miralax 1 capful in tall glass of water 2 to 3 times/week.  Avoid or slow down if diarrhea happens. Goal of soft formed, non-strained BM daily or every other day with complete emptying.     2. Timed voiding and helping to retrain the connection between bladder and brain, Urinate every 2-3 hours to retrain the bladder.  3. Pelvic floor therapy   4. Vaginal estrogen cream if a candidate- we can discuss this at follow up   If perimenopausal or postmenopausal then consideration of estrogen cream vaginally and periurethrally twice a week if there is no history of blood clots, tobacco use or breast cancer.

## 2024-10-25 LAB — BACTERIA UR CULT: NO GROWTH

## 2024-12-17 ENCOUNTER — OFFICE VISIT (OUTPATIENT)
Dept: OBGYN | Facility: OTHER | Age: 69
End: 2024-12-17
Attending: NURSE PRACTITIONER
Payer: COMMERCIAL

## 2024-12-17 VITALS
HEART RATE: 57 BPM | SYSTOLIC BLOOD PRESSURE: 138 MMHG | BODY MASS INDEX: 28.92 KG/M2 | DIASTOLIC BLOOD PRESSURE: 78 MMHG | WEIGHT: 168.5 LBS

## 2024-12-17 DIAGNOSIS — R33.9 URINARY RETENTION WITH INCOMPLETE BLADDER EMPTYING: Primary | ICD-10-CM

## 2024-12-17 DIAGNOSIS — B37.31 YEAST INFECTION OF THE VAGINA: ICD-10-CM

## 2024-12-17 DIAGNOSIS — N89.8 VAGINAL IRRITATION: ICD-10-CM

## 2024-12-17 DIAGNOSIS — L90.0 LICHEN SCLEROSUS: ICD-10-CM

## 2024-12-17 DIAGNOSIS — Z78.0 POSTMENOPAUSAL ESTROGEN DEFICIENCY: ICD-10-CM

## 2024-12-17 DIAGNOSIS — N39.46 MIXED INCONTINENCE: ICD-10-CM

## 2024-12-17 LAB
BACTERIAL VAGINOSIS VAG-IMP: NEGATIVE
CANDIDA DNA VAG QL NAA+PROBE: DETECTED
CANDIDA GLABRATA / CANDIDA KRUSEI DNA: NOT DETECTED
T VAGINALIS DNA VAG QL NAA+PROBE: NOT DETECTED

## 2024-12-17 PROCEDURE — G0463 HOSPITAL OUTPT CLINIC VISIT: HCPCS

## 2024-12-17 PROCEDURE — 99214 OFFICE O/P EST MOD 30 MIN: CPT | Performed by: NURSE PRACTITIONER

## 2024-12-17 PROCEDURE — G2211 COMPLEX E/M VISIT ADD ON: HCPCS | Performed by: NURSE PRACTITIONER

## 2024-12-17 PROCEDURE — 0352U MULTIPLEX VAGINAL PANEL BY PCR: CPT | Mod: ZL | Performed by: NURSE PRACTITIONER

## 2024-12-17 RX ORDER — CLOBETASOL PROPIONATE 0.5 MG/G
OINTMENT TOPICAL
Qty: 60 G | Refills: 1 | Status: SHIPPED | OUTPATIENT
Start: 2024-12-19 | End: 2024-12-17

## 2024-12-17 RX ORDER — ESTRADIOL 0.1 MG/G
2 CREAM VAGINAL
Qty: 42.5 G | Refills: 1 | Status: SHIPPED | OUTPATIENT
Start: 2024-12-19

## 2024-12-17 RX ORDER — CLOBETASOL PROPIONATE 0.5 MG/G
OINTMENT TOPICAL
Qty: 60 G | Refills: 1 | Status: SHIPPED | OUTPATIENT
Start: 2024-12-19

## 2024-12-17 RX ORDER — ESTRADIOL 0.1 MG/G
2 CREAM VAGINAL
Qty: 42.5 G | Refills: 1 | Status: SHIPPED | OUTPATIENT
Start: 2024-12-19 | End: 2024-12-17

## 2024-12-17 RX ORDER — FLUCONAZOLE 150 MG/1
150 TABLET ORAL WEEKLY
Qty: 3 TABLET | Refills: 0 | Status: SHIPPED | OUTPATIENT
Start: 2024-12-17 | End: 2025-01-01

## 2024-12-17 ASSESSMENT — PAIN SCALES - GENERAL: PAINLEVEL_OUTOF10: NO PAIN (0)

## 2024-12-17 NOTE — PROGRESS NOTES
Follow up    CHIEF COMPLAINT: 2 month follow up incontinence, lichen sclerosus, vaginal irritation    HPI:    Margie Mccoy is a 69 year old , here for follow up for vaginal itching and pain, lichen sclerosus, incontinence and recent yeast infection. Patient has 30 years of intermittent itching and vaginal pain- worse over the past 2 years. She also has mixed urinary incontinence with incomplete emptying- PVR at last visit was 107mL. UA/UC was negative for infection.  Has been focusing on avoiding bladder irritants.  Denies any concern with constipation.  She is practicing timed voiding.    At her last visit she was diagnosed with lichen sclerosis and started on treatment with twice daily clobetasol ointment to external vaginal area.  She has used this twice daily for 1 month and now is decrease frequency to once daily.  She can tell over the past 2 weeks that her symptoms have improved.  She does still have some vaginal inflammation and itching but does feel like it is gotten better.    At last visit she tested positive for vaginal yeast infection on 10/23/24, treated with Diflucan 150mg.  She did notice improvement of her vaginal itching and irritation, wonders if infection is cleared.    No LMP recorded. Patient is postmenopausal.    ROS: 10-Point ROS negative except as noted in HPI    Physical Exam  /78   Pulse 57   Wt 76.4 kg (168 lb 8 oz)   BMI 28.92 kg/m    Body mass index is 28.92 kg/m .  Gen: Well-appearing, well developed  Resp: nonlabored, normal effort, no audible wheezing  MS: moving without difficulty  Psych: appropriate mood and affect    Pelvic:   Bilateral external labia red, inflamed, edematous with scattered.  No visible micro abrasion/excoriation.  Labia minora fusion to labia majora.  No tenderness over introitus.  Normal hair distribution. Vagina is without lesions.  White thick vaginal discharge. Cervix normal, no lesions, no cervical motion tenderness. Uterus is small,  mobile, non-tender. No adnexal tenderness or masses.  Mild urethral mobility.  Grade 1 cystocele.   Chaperone present     ASSESSMENT/PLAN  Margie Mccoy is a 69 year old  female here for vaginal irritation, follow-up for lichen sclerosus, estrogen deficiency, yeast infection and incontinence  1. Urinary retention with incomplete bladder emptying (Primary)  with  2. Mixed incontinence  Review ways to help manage incontinence.  Reviewed conservative treatment options including avoiding bladder irritants, preventing constipation and timed voiding.  Recommend pelvic floor rehab.  She would like to start with some exercises at home, these have been added to her after visit summary.  She would like to trial vaginal estrogen cream.  Review instructions on how to properly use periurethral and vaginal estrogen cream for prevention and management of incontinence.  Recommend daily at bedtime for 2 weeks then decrease frequency to twice weekly.    3. Lichen sclerosus  - clobetasol (TEMOVATE) 0.05 % external ointment; Apply topically twice a week. Bedtime to external genitalia twice weekly  Dispense: 60 g; Refill: 1  Patient has been using clobetasol steroid ointment for management of lichen sclerosis as prescribed.  She is completed 1 month of twice daily has not been using this daily at bedtime.  She does have improvement of labial inflammation, irritation and itching.  Labial excoriations and lesions have healed.  Recommend continuing clobetasol steroid ointment daily at bedtime for 2 weeks then decrease frequency to twice weekly for management.  This will be a chronic medication.    4. Vaginal irritation  - Multiplex Vaginal Panel by PCR  Improved however still having vaginal irritation, pain and itching.  She will continue clobetasol steroid Omed for management of lichen sclerosis.  Will also add in periurethral vaginal estrogen cream for management of postmenopausal estrogen deficiency and help with incontinence.   We will recheck MVP to ensure recent vaginal yeast infection has cleared.    5. Postmenopausal estrogen deficiency  - estradiol (ESTRACE) 0.1 MG/GM vaginal cream; Place 2 g vaginally twice a week. Throw applicator away. Apply nickel size amount to index finger and apply periurethrally and insert the rest vaginally. Nightly for two weeks then decrease to twice weekly at bedtime.  Dispense: 42.5 g; Refill: 1  Review symptoms of postmenopausal vaginal estrogen deficiency and atrophy.  I do think she would benefit from starting.  Thrown vaginal estrogen cream for management of vaginal itching and irritation especially internal vaginal itching and at introitus.  This will also help improve pain with intercourse and hopefully improve incontinence.  She will start periurethral and vaginal estrogen cream daily at bedtime for 2 weeks then decrease frequency to twice weekly at bedtime.  Reviewed risks and benefits of estrogen therapy reviewed.  She does not have any risk factors that would contraindicate topical estrogen therapy.    6. Yeast infection of the vagina  - fluconazole (DIFLUCAN) 150 MG tablet; Take 1 tablet (150 mg) by mouth once a week for 3 doses.  Dispense: 3 tablet; Refill: 0   Vaginal swab returned positive for vaginal yeast infection again.  Patient does note that she has rosacea and does take doxycycline antibiotic 3 times a week.  She is switched to topical treatment.  She wonders if this may play a role in her recent yeast infection. I recommend weekly Diflucan 150 mg for the next 3 weeks.  If symptoms do not continue to improve recommend follow-up.    Recommend follow-up in 6 months, or sooner if not having improvement with treatment plan.    Liza Winkler NP  [facility]    OB/GYN

## 2024-12-17 NOTE — NURSING NOTE
"Chief Complaint   Patient presents with    Urinary Problem     Patient is here for a follow up for lichen. Patient is using chlobestol and uses it off and on.   Initial /78   Pulse 57   Wt 76.4 kg (168 lb 8 oz)   BMI 28.92 kg/m   Estimated body mass index is 28.92 kg/m  as calculated from the following:    Height as of 10/23/24: 1.626 m (5' 4\").    Weight as of this encounter: 76.4 kg (168 lb 8 oz).  Medication Reconciliation: complete    Camille Dooley LPN    "

## 2025-06-07 ENCOUNTER — HEALTH MAINTENANCE LETTER (OUTPATIENT)
Age: 70
End: 2025-06-07

## (undated) DEVICE — GLOVE BIOGEL PI INDICATOR 8.0 LF 41680

## (undated) DEVICE — ENDO BRUSH CHANNEL MASTER CLEANING 2-4.2MM BW-412T

## (undated) DEVICE — ENDO FORCEP ENDOJAW BIOPSY 2.8MMX230CM FB-220U

## (undated) DEVICE — CAST PADDING 2" COTTON WEBRIL UNSTERILE 9082

## (undated) DEVICE — ENDO KIT COMPLIANCE DYKENDOCMPLY

## (undated) DEVICE — PACK MAJOR EXTREMITY SOP15MEFCA

## (undated) DEVICE — BNDG ELASTIC 2"X5YDS UNSTERILE 6611-20

## (undated) DEVICE — PREP CHLORAPREP 26ML TINTED ORANGE  260815

## (undated) DEVICE — SU ETHILON 4-0 FS-2 18" 662G

## (undated) DEVICE — SOL WATER 1500ML

## (undated) DEVICE — ESU GROUND PAD ADULT W/CORD E7507

## (undated) DEVICE — TUBING SUCTION 10'X3/16" N510

## (undated) DEVICE — DRSG GAUZE 4X4" 3033

## (undated) DEVICE — SUCTION MANIFOLD NEPTUNE 2 SYS 4 PORT 0702-020-000

## (undated) DEVICE — DRAPE STERI TOWEL LG 1010

## (undated) DEVICE — TOURNIQUET SGL BLADDER 18"X4" RED 5921-218-135

## (undated) DEVICE — GLOVE PROTEXIS POWDER FREE SMT 8.0  2D72PT80X

## (undated) DEVICE — LIGHT HANDLES PLASTIC

## (undated) DEVICE — DRSG XEROFORM 1X8"

## (undated) RX ORDER — PROPOFOL 10 MG/ML
INJECTION, EMULSION INTRAVENOUS
Status: DISPENSED
Start: 2020-02-07

## (undated) RX ORDER — LIDOCAINE HYDROCHLORIDE 10 MG/ML
INJECTION, SOLUTION INFILTRATION; PERINEURAL
Status: DISPENSED
Start: 2018-09-21

## (undated) RX ORDER — FENTANYL CITRATE 50 UG/ML
INJECTION, SOLUTION INTRAMUSCULAR; INTRAVENOUS
Status: DISPENSED
Start: 2018-09-21

## (undated) RX ORDER — TRIAMCINOLONE ACETONIDE 40 MG/ML
INJECTION, SUSPENSION INTRA-ARTICULAR; INTRAMUSCULAR
Status: DISPENSED
Start: 2020-04-20

## (undated) RX ORDER — BUPIVACAINE HYDROCHLORIDE 2.5 MG/ML
INJECTION, SOLUTION INFILTRATION; PERINEURAL
Status: DISPENSED
Start: 2018-09-21

## (undated) RX ORDER — LIDOCAINE HYDROCHLORIDE 20 MG/ML
INJECTION, SOLUTION EPIDURAL; INFILTRATION; INTRACAUDAL; PERINEURAL
Status: DISPENSED
Start: 2020-02-07

## (undated) RX ORDER — PROPOFOL 10 MG/ML
INJECTION, EMULSION INTRAVENOUS
Status: DISPENSED
Start: 2018-09-21

## (undated) RX ORDER — LIDOCAINE HYDROCHLORIDE 20 MG/ML
INJECTION, SOLUTION EPIDURAL; INFILTRATION; INTRACAUDAL; PERINEURAL
Status: DISPENSED
Start: 2018-09-21

## (undated) RX ORDER — LIDOCAINE HYDROCHLORIDE 10 MG/ML
INJECTION, SOLUTION EPIDURAL; INFILTRATION; INTRACAUDAL; PERINEURAL
Status: DISPENSED
Start: 2020-04-20